# Patient Record
Sex: MALE | Race: WHITE | NOT HISPANIC OR LATINO | Employment: OTHER | URBAN - METROPOLITAN AREA
[De-identification: names, ages, dates, MRNs, and addresses within clinical notes are randomized per-mention and may not be internally consistent; named-entity substitution may affect disease eponyms.]

---

## 2017-10-18 ENCOUNTER — TRANSFERRED RECORDS (OUTPATIENT)
Dept: HEALTH INFORMATION MANAGEMENT | Facility: CLINIC | Age: 57
End: 2017-10-18

## 2017-10-18 LAB
ALT SERPL-CCNC: 29 U/L (ref 0–65)
AST SERPL-CCNC: 17 U/L (ref 5–40)
CHOLEST SERPL-MCNC: 272 MG/DL
CREAT SERPL-MCNC: 0.8 MG/DL (ref 0.5–1.5)
GFR SERPL CREATININE-BSD FRML MDRD: >60 ML/MIN/1.73M2
GLUCOSE SERPL-MCNC: 98 MG/DL (ref 70–100)
HDLC SERPL-MCNC: 39 MG/DL (ref 40–67)
LDLC SERPL CALC-MCNC: 184 MG/DL
POTASSIUM SERPL-SCNC: 4.4 MEQ/L (ref 3.5–5)
TRIGL SERPL-MCNC: 245 MG/DL

## 2017-11-30 ENCOUNTER — OFFICE VISIT (OUTPATIENT)
Dept: ORTHOPEDICS | Facility: CLINIC | Age: 57
End: 2017-11-30
Payer: COMMERCIAL

## 2017-11-30 ENCOUNTER — RADIANT APPOINTMENT (OUTPATIENT)
Dept: GENERAL RADIOLOGY | Facility: CLINIC | Age: 57
End: 2017-11-30
Attending: ORTHOPAEDIC SURGERY
Payer: COMMERCIAL

## 2017-11-30 VITALS — HEIGHT: 68 IN

## 2017-11-30 DIAGNOSIS — M16.0 PRIMARY OSTEOARTHRITIS OF BOTH HIPS: Primary | ICD-10-CM

## 2017-11-30 DIAGNOSIS — M25.552 BILATERAL HIP PAIN: ICD-10-CM

## 2017-11-30 DIAGNOSIS — M25.551 BILATERAL HIP PAIN: ICD-10-CM

## 2017-11-30 PROCEDURE — 99204 OFFICE O/P NEW MOD 45 MIN: CPT | Performed by: ORTHOPAEDIC SURGERY

## 2017-11-30 PROCEDURE — 73523 X-RAY EXAM HIPS BI 5/> VIEWS: CPT | Mod: TC

## 2017-11-30 RX ORDER — CINNAMON BARK
POWDER (GRAM) MISCELLANEOUS
COMMUNITY

## 2017-11-30 RX ORDER — SODIUM PHOSPHATE,MONO-DIBASIC 19G-7G/118
1 ENEMA (ML) RECTAL DAILY
Status: ON HOLD | COMMUNITY
End: 2018-02-14

## 2017-11-30 RX ORDER — ATORVASTATIN CALCIUM 80 MG/1
80 TABLET, FILM COATED ORAL DAILY
COMMUNITY
End: 2019-06-20 | Stop reason: ALTCHOICE

## 2017-11-30 ASSESSMENT — PAIN SCALES - GENERAL: PAINLEVEL: MILD PAIN (2)

## 2017-11-30 NOTE — PATIENT INSTRUCTIONS
Patient advised that there is greater risk with being heavy for longevity of hip replacement and infection risk.     Advised vs 1 meal to have 3 light meals per day with no between meal snacking.  Reduce carbs.

## 2017-11-30 NOTE — PROGRESS NOTES
ORTHOPEDIC CLINIC CONSULT      Markie Rico is a 57 year old male who [facility]  History of Present illness:  Markie presents for evaluation of:   1.) Bilateral hip pain    Onset:  Over a year ago    Symptoms brought on by Pain started in right hip and gradually got worse over time.     Character:  dull ache all the timeand Loss of ROM.    Progression of symptoms:  worse.    Previous similar pain: no .   Pain Level:  2/10 sitting.  10/10 at times  Previous treatments:  rest/inactivity, Ibuprofen, tramadol, Acetaminophen,  and bilateral cortisone injections into hip joint x2.  Currently on Blood thinners? No  Diagnosis of Diabetes? No    Patient reports as above. He states he admits to getting depressed due to lack of mobility. He states he weighed a lot less 1 year ago.  Patient reports he wants started with a cane to help with his right hip 1 year ago and now he is using 2 canes.  Patient also states he sleeps a recliner as he is unable to lay flat in bed. Patient states he's had issues with sciatic pain bilaterally. He has seen chiropractic for that. Patient states also a history of falling off scaffolding and ladders but no known injury to his hips. Patient states he does chew tobacco but he has not done any recent prednisone or does not drink heavily.    Patient's past medical, surgical, social and family histories reviewed.       Past Medical History:   Diagnosis Date     Diverticulitis      Old myocardial infarction     s/p successful emergent revascualrization, PTCR/stent at the [facility] for acute MI     Old myocardial infarction 06/07/11    Non-Q-wave MI-[place]     Other and unspecified hyperlipidemia      Tobacco use disorder     patient states a history of sciatic nerve problems bilaterally   Past Surgical History:   Procedure Laterality Date     C ANESTH,DX ARTHROSCOPIC PROC KNEE JOINT       C COLOSTOMY       C UNLISTED LAPAROSCOPY PROC,INTESTINE  7/25/2003    Exploratory  Lap. Lysis of adhesions. Sigmoid colostomy takedown and stapled coloproctostomy.     HC PTCA SINGLE VESSEL      2 stents       Home Medications:  Prior to Admission medications    Medication Sig Start Date End Date Taking? Authorizing Provider   atorvastatin (LIPITOR) 80 MG tablet Take 80 mg by mouth daily   Yes Reported, Patient   TRAMADOL HCL PO Take 100 mg by mouth 3 times daily as needed for moderate to severe pain   Yes Reported, Patient   Cinnamon Bark POWD    Yes Reported, Patient   Capsicum, Cayenne, (CAYENNE PO)    Yes Reported, Patient   TURMERIC PO    Yes Reported, Patient   glucosamine-chondroitin 500-400 MG CAPS per capsule Take 1 capsule by mouth daily   Yes Reported, Patient   metoprolol (TOPROL XL) 50 MG 24 hr tablet Take 1 tablet by mouth daily.  Patient taking differently: Take 25 mg by mouth 2 times daily  12  Yes Alfred Abernathy MD   lisinopril (PRINIVIL,ZESTRIL) 10 MG tablet Take 1 tablet by mouth daily. Take 10 mg by mouth daily. 12  Yes Alfred Abernathy MD   POTASSIUM CHLORIDE CR PO Take 10 mEq by mouth daily.   Yes Reported, Patient   nitroglycerin (NITROSTAT) 0.4 MG SL tablet Place 1 tablet under the tongue every 5 minutes as needed.  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 He does have 3 people who do help him in his workshop.      Family History   Problem Relation Age of Onset     Lipids Mother      Psychotic Disorder Mother      anxiety     Hypertension Mother      Prostate Cancer Father       at 74     DIABETES Sister   "      REVIEW OF SYSTEMS    General: Negative for fever or fatigue    Psych:  Although not formally diagnosed patient does admit to a depressed mood presently due to immobility    Ophthalmic:  Corrective lenses? Yes    ENT:  Hearing difficulty? No    CV: Past history of myocardial infarction in 2010 and current coronary artery disease    Endocrine: Negative diabetes     Urology:  Negative kidney disease    Resp: Normal respiratory effort     Skin: Negative for cuts/sores or redness    Musculoskeletal: as above    Neurologic: Negative for numbness/tingling    Hematologic: negative for bleeding disorder, does not use of prescription anticoagulants         Physical Exam:    Vitals: Ht 1.715 m (5' 7.5\")  BMI= There is no height or weight on file to calculate BMI. patient's weight per last annual exam 10/17/17 was 297.9 pounds    GENERAL APPEARANCE:   Healthy, alert, no distress    SKIN:  Negative for suspicious lesions or rashes    NEURO: Normal strength and tone, mentation intact and speech normal    PSYCH:   Mentation appears Normal and affect normal/bright    RESPIRATORY: Negative for respiratory distress.    EYES: Negative for Conjunctivitis    Cardiovascular: Patient with minimal dependent edema.      GAIT: Extremely antalgic. Patient utilizing 2 canes and still walking with an antalgic gait    JOINT/EXTREMITIES:  Patient is unable to flex at the hips enough to sit in a regular chair. When asked to lie on the exam table patient requires and requests assistance. He was unable to lift his legs up onto the table.    Radiographs: X-ray images reveal significant bone-on-bone femoral head to acetabulum and erosion and deformation of bilateral hips right greater than left.  Shallow socket also identified.  Independent visualization of the films was made.       Impression:      ICD-10-CM    1. Primary osteoarthritis of both hips M16.0    2. Bilateral hip pain M25.551 XR Pelvis and Hip Bilateral 2 Views    M25.552  "     Patient with significant bone-on-bone and deformed bilateral hip x-rays.  Patient's mobility is significantly impacted. This has contributed in part to patient's rapid weight gain.    Plan:  Patient is advised of the above.  Weight issues are brought up and patient is advised of the significant risk that weight complaining into obtaining total hip arthroplasty. Patient states he would like to change his weight issues but has found that he is becoming more depressed as a result.  Patient requests weight recommendations to be written down so that his wife can help him with his weight loss process.  Patient is advised that typically we request weight loss prior to scheduling surgery however with the condition of his hips, performing the surgery would give him a better opportunity. Patient does state a commitment to losing weight even beyond the surgery. He would like to do right hip first. He would like to wait till after the first of the year as he is visiting his daughter out of state in a month for the holidays.  Patient is advised preoperative labs to obtain.  Patient will be called to schedule surgery    Patient is evaluated with and plan developed in conjunction with Dr. Bedolla    Scribed by  Aide Dias PA-C   2017  3:19 PM        I attest I have seen and evaluated the patient.  I agree with above impression and plan.    Vineet Bedolla MD    Please schedule for surgery, pre op H&P, and post ops.      Patient Name:  Markie Rico ([id number]).  :  1960  Gender:  male  Patient Type:  AM Admit  Surgeon:  Vineet Bedolla MD  Physician requests assist from:  PA    Procedures:    Right total hip arthroplasty   Approach:  Posterior  Diagnosis:     Primary osteoarthritis of both hips    Special instruments/supplies:  [employer]., portable x-ray  Vendor Rep:  [employer].  Anesthesia:  General  Block:  No   Block type: Not applicable  Time needed:  120 minutes    [facility] Discussed:  Not at this time.  Patient will be informed of this at his one week prior to total visit    Post op 1:

## 2017-11-30 NOTE — LETTER
11/30/2017         RE: Markie Rico  [address] [address]        Dear Colleague,    Thank you for referring your patient, Markie Rico, to the [facility]. Please see a copy of my visit note below.    ORTHOPEDIC CLINIC CONSULT      Markie Rico is a 57 year old male who is [facility]  History of Present illness:  Markie presents for evaluation of:   1.) Bilateral hip pain    Onset:  Over a year ago    Symptoms brought on by Pain started in right hip and gradually got worse over time.     Character:  dull ache all the timeand Loss of ROM.    Progression of symptoms:  worse.    Previous similar pain: no .   Pain Level:  2/10 sitting.  10/10 at times  Previous treatments:  rest/inactivity, Ibuprofen, tramadol, Acetaminophen,  and bilateral cortisone injections into hip joint x2.  Currently on Blood thinners? No  Diagnosis of Diabetes? No    Patient reports as above. He states he admits to getting depressed due to lack of mobility. He states he weighed a lot less 1 year ago.  Patient reports he wants started with a cane to help with his right hip 1 year ago and now he is using 2 canes.  Patient also states he sleeps a recliner as he is unable to lay flat in bed. Patient states he's had issues with sciatic pain bilaterally. He has seen chiropractic for that. Patient states also a history of falling off scaffolding and ladders but no known injury to his hips. Patient states he does chew tobacco but he has not done any recent prednisone or does not drink heavily.    Patient's past medical, surgical, social and family histories reviewed.       Past Medical History:   Diagnosis Date     Diverticulitis      Old myocardial infarction     s/p successful emergent revascualrization, PTCR/stent at the [facility] for acute MI     Old myocardial infarction 06/07/11    Non-Q-wave MI-[place]     Other and unspecified hyperlipidemia      Tobacco use disorder     patient  states a history of sciatic nerve problems bilaterally   Past Surgical History:   Procedure Laterality Date     C ANESTH,DX ARTHROSCOPIC PROC KNEE JOINT       C COLOSTOMY       C UNLISTED LAPAROSCOPY PROC,INTESTINE  7/25/2003    Exploratory Lap. Lysis of adhesions. Sigmoid colostomy takedown and stapled coloproctostomy.     HC PTCA SINGLE VESSEL      2 stents       Home Medications:  Prior to Admission medications    Medication Sig Start Date End Date Taking? Authorizing Provider   atorvastatin (LIPITOR) 80 MG tablet Take 80 mg by mouth daily   Yes Reported, Patient   TRAMADOL HCL PO Take 100 mg by mouth 3 times daily as needed for moderate to severe pain   Yes Reported, Patient   Cinnamon Bark POWD    Yes Reported, Patient   Capsicum, Cayenne, (CAYENNE PO)    Yes Reported, Patient   TURMERIC PO    Yes Reported, Patient   glucosamine-chondroitin 500-400 MG CAPS per capsule Take 1 capsule by mouth daily   Yes Reported, Patient   metoprolol (TOPROL XL) 50 MG 24 hr tablet Take 1 tablet by mouth daily.  Patient taking differently: Take 25 mg by mouth 2 times daily  11/23/12  Yes Alfred Abernathy MD   lisinopril (PRINIVIL,ZESTRIL) 10 MG tablet Take 1 tablet by mouth daily. Take 10 mg by mouth daily. 11/23/12  Yes Alfred Abernathy MD   POTASSIUM CHLORIDE CR PO Take 10 mEq by mouth daily.   Yes Reported, Patient   nitroglycerin (NITROSTAT) 0.4 MG SL tablet Place 1 tablet under the tongue every 5 minutes as needed.  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 He does have 3 people  "who do help him in his workshop.      Family History   Problem Relation Age of Onset     Lipids Mother      Psychotic Disorder Mother      anxiety     Hypertension Mother      Prostate Cancer Father       at 74     DIABETES Sister        REVIEW OF SYSTEMS    General: Negative for fever or fatigue    Psych:  Although not formally diagnosed patient does admit to a depressed mood presently due to immobility    Ophthalmic:  Corrective lenses? Yes    ENT:  Hearing difficulty? No    CV: Past history of myocardial infarction in 2010 and current coronary artery disease    Endocrine: Negative diabetes     Urology:  Negative kidney disease    Resp: Normal respiratory effort     Skin: Negative for cuts/sores or redness    Musculoskeletal: as above    Neurologic: Negative for numbness/tingling    Hematologic: negative for bleeding disorder, does not use of prescription anticoagulants         Physical Exam:    Vitals: Ht 1.715 m (5' 7.5\")  BMI= There is no height or weight on file to calculate BMI. patient's weight per last annual exam 10/17/17 was 297.9 pounds    GENERAL APPEARANCE:   Healthy, alert, no distress    SKIN:  Negative for suspicious lesions or rashes    NEURO: Normal strength and tone, mentation intact and speech normal    PSYCH:   Mentation appears Normal and affect normal/bright    RESPIRATORY: Negative for respiratory distress.    EYES: Negative for Conjunctivitis    Cardiovascular: Patient with minimal dependent edema.      GAIT: Extremely antalgic. Patient utilizing 2 canes and still walking with an antalgic gait    JOINT/EXTREMITIES:  Patient is unable to flex at the hips enough to sit in a regular chair. When asked to lie on the exam table patient requires and requests assistance. He was unable to lift his legs up onto the table.    Radiographs: X-ray images reveal significant bone-on-bone femoral head to acetabulum and erosion and deformation of bilateral hips right greater than left.  Shallow socket " also identified.  Independent visualization of the films was made.       Impression:      ICD-10-CM    1. Primary osteoarthritis of both hips M16.0    2. Bilateral hip pain M25.551 XR Pelvis and Hip Bilateral 2 Views    M25.552      Patient with significant bone-on-bone and deformed bilateral hip x-rays.  Patient's mobility is significantly impacted. This has contributed in part to patient's rapid weight gain.    Plan:  Patient is advised of the above.  Weight issues are brought up and patient is advised of the significant risk that weight complaining into obtaining total hip arthroplasty. Patient states he would like to change his weight issues but has found that he is becoming more depressed as a result.  Patient requests weight recommendations to be written down so that his wife can help him with his weight loss process.  Patient is advised that typically we request weight loss prior to scheduling surgery however with the condition of his hips, performing the surgery would give him a better opportunity. Patient does state a commitment to losing weight even beyond the surgery. He would like to do right hip first. He would like to wait till after the first of the year as he is visiting his daughter out of state in a month for the holidays.  Patient is advised preoperative labs to obtain.  Patient will be called to schedule surgery    Patient is evaluated with and plan developed in conjunction with Dr. Bedolla    Scribed by  Aide Dias PA-C   2017  3:19 PM        I attest I have seen and evaluated the patient.  I agree with above impression and plan.    Vineet Bedolla MD    Please schedule for surgery, pre op H&P, and post ops.      Patient Name:  Markie Rico ([id number]).  :  1960  Gender:  male  Patient Type:  AM Admit  Surgeon:  Vineet Bedolla MD  Physician requests assist from:  PA    Procedures:    Right total hip arthroplasty   Approach:  Posterior  Diagnosis:     Primary osteoarthritis of  both hips    Special instruments/supplies:  [employer], portable x-ray  Vendor Rep:  [employer]  Anesthesia:  General  Block:  No   Block type: Not applicable  Time needed:  120 minutes    [facility] Discussed:  Not at this time. Patient will be informed of this at his one week prior to total visit    Post op 1:                  Again, thank you for allowing me to participate in the care of your patient.        Sincerely,        Vineet Bedolla MD

## 2017-11-30 NOTE — MR AVS SNAPSHOT
After Visit Summary   11/30/2017    Markie Rico    MRN: [id number]           Patient Information     Date Of Birth          1960        Visit Information        Provider Department      11/30/2017 2:50 PM Vineet Bedolla MD [facility]        Today's Diagnoses     Primary osteoarthritis of both hips    -  1    Bilateral hip pain          Care Instructions    Patient advised that there is greater risk with being heavy for longevity of hip replacement and infection risk.     Advised vs 1 meal to have 3 light meals per day with no between meal snacking.  Reduce carbs.               Follow-ups after your visit        Who to contact     If you have questions or need follow up information about today's clinic visit or your schedule please contact [facility] directly at [phone].  Normal or non-critical lab and imaging results will be communicated to you by Aloqahart, letter or phone within 4 business days after the clinic has received the results. If you do not hear from us within 7 days, please contact the clinic through Mangat[employer] or phone. If you have a critical or abnormal lab result, we will notify you by phone as soon as possible.  Submit refill requests through [employer] or call your pharmacy and they will forward the refill request to us. Please allow 3 business days for your refill to be completed.          Additional Information About Your Visit        AloqaharBungolow Information     [employer] gives you secure access to your electronic health record. If you see a primary care provider, you can also send messages to your care team and make appointments. If you have questions, please call your primary care clinic.  If you do not have a primary care provider, please call [phone] and they will assist you.        Care EveryWhere ID     This is your Care EveryWhere ID. This could be used by other organizations to access your [place] medical records  [id number]       "  Your Vitals Were     Height                   1.715 m (5' 7.5\")            Blood Pressure from Last 3 Encounters:   05/18/13 116/75   11/23/12 114/72   05/15/12 119/74    Weight from Last 3 Encounters:   11/23/12 133.8 kg (295 lb)   05/15/12 120.2 kg (265 lb)   06/07/11 115.7 kg (255 lb)                 Today's Medication Changes          These changes are accurate as of: 11/30/17  3:16 PM.  If you have any questions, ask your nurse or doctor.               These medicines have changed or have updated prescriptions.        Dose/Directions    metoprolol 50 MG 24 hr tablet   Commonly known as:  TOPROL XL   This may have changed:    - how much to take  - when to take this   Used for:  History of acute myocardial infarction of inferior wall, CAD (coronary artery disease)        Dose:  50 mg   Take 1 tablet by mouth daily.   Quantity:  90 tablet   Refills:  3                Primary Care Provider Office Phone # Fax #    Alfred Seth Abernathy [phone]       [address] [place] [address]        Equal Access to Services     [facility]: Hadii ivy garcia Sochristoph, waaxda luqsoraya, qaybta kaaljefry orr, lela root. So [facility] [phone].    ATENCIÓN: Si habla español, tiene a martin disposición servicios gratuitos de asistencia lingüística. Memo al [phone].    We comply with applicable federal civil rights laws and [place] laws. We do not discriminate on the basis of race, color, national origin, age, disability, sex, sexual orientation, or gender identity.            Thank you!     Thank you for choosing [facility]  for your care. Our goal is always to provide you with excellent care. Hearing back from our patients is one way we can continue to improve our services. Please take a few minutes to complete the written survey that you may receive in the mail after your visit with us. Thank you!             Your Updated Medication List - " Protect others around you: Learn how to safely use, store and throw away your medicines at www.disposemymeds.org.          This list is accurate as of: 11/30/17  3:16 PM.  Always use your most recent med list.                   Brand Name Dispense Instructions for use Diagnosis    atorvastatin 80 MG tablet    LIPITOR     Take 80 mg by mouth daily        CAYENNE PO           Cinnamon Bark Powd           glucosamine-chondroitin 500-400 MG Caps per capsule      Take 1 capsule by mouth daily        lisinopril 10 MG tablet    PRINIVIL/ZESTRIL    90 tablet    Take 1 tablet by mouth daily. Take 10 mg by mouth daily.    History of acute myocardial infarction of inferior wall, CAD (coronary artery disease)       metoprolol 50 MG 24 hr tablet    TOPROL XL    90 tablet    Take 1 tablet by mouth daily.    History of acute myocardial infarction of inferior wall, CAD (coronary artery disease)       nitroGLYcerin 0.4 MG sublingual tablet    NITROSTAT    15 tablet    Place 1 tablet under the tongue every 5 minutes as needed.    History of acute myocardial infarction of inferior wall, CAD (coronary artery disease)       POTASSIUM CHLORIDE CR PO      Take 10 mEq by mouth daily.        TRAMADOL HCL PO      Take 100 mg by mouth 3 times daily as needed for moderate to severe pain        TURMERIC PO

## 2017-11-30 NOTE — NURSING NOTE
"Chief Complaint   Patient presents with     Consult       Initial Ht 1.715 m (5' 7.5\") Estimated body mass index is 45.19 kg/(m^2) as calculated from the following:    Height as of 11/23/12: 1.721 m (5' 7.75\").    Weight as of 11/23/12: 133.8 kg (295 lb).  Medication Reconciliation: complete   YULIYA Hutchins  "

## 2017-12-07 ENCOUNTER — TELEPHONE (OUTPATIENT)
Dept: ORTHOPEDICS | Facility: CLINIC | Age: 57
End: 2017-12-07

## 2017-12-07 NOTE — TELEPHONE ENCOUNTER
Surgery Scheduled    Date of Surgery 02/12/18 Time of Surgery   Procedure: Right total hip arthroplasty  Hospital/Surgical Facility: [facility]  Surgeon: Dr. Bedolla  Type of Anesthesia : General  [facility] or if hew can have it here.   Post op:02/26/18 with Dr. Bedolla        Surgery packet mailed to patient's home address. Patients instructed to arrive 1 hours prior to surgery. Patient understood and agrees to plan.    Komal Fisher  Surgery Scheduler

## 2017-12-19 DIAGNOSIS — Z01.818 PRE-OP EXAM: Primary | ICD-10-CM

## 2018-01-08 NOTE — TELEPHONE ENCOUNTER
Please see message below -      Patient called, states the surgery date needs to be faxed [facility] for the [facility] to [facility]. Please fax to  Dr. García, chanda Gross - [phone] - Ginny's phone number is [phone].    Thank you, Rhina.

## 2018-01-26 ENCOUNTER — TRANSFERRED RECORDS (OUTPATIENT)
Dept: HEALTH INFORMATION MANAGEMENT | Facility: CLINIC | Age: 58
End: 2018-01-26

## 2018-01-26 LAB
ALT SERPL-CCNC: 34 U/L (ref 0–65)
AST SERPL-CCNC: 23 U/L (ref 5–40)
CHOLEST SERPL-MCNC: 321 MG/DL
CREAT SERPL-MCNC: 0.8 MG/DL (ref 0.5–1.5)
GFR SERPL CREATININE-BSD FRML MDRD: >60 ML/MIN/1.73M2
GLUCOSE SERPL-MCNC: 116 MG/DL (ref 70–100)
HDLC SERPL-MCNC: 39 MG/DL (ref 40–67)
LDLC SERPL CALC-MCNC: 240 MG/DL
POTASSIUM SERPL-SCNC: 4.8 MEQ/L (ref 3.5–5)
TRIGL SERPL-MCNC: 209 MG/DL

## 2018-02-05 ENCOUNTER — OFFICE VISIT (OUTPATIENT)
Dept: ORTHOPEDICS | Facility: CLINIC | Age: 58
End: 2018-02-05

## 2018-02-05 VITALS — WEIGHT: 286 LBS | HEIGHT: 68 IN | BODY MASS INDEX: 43.35 KG/M2 | TEMPERATURE: 97.1 F

## 2018-02-05 DIAGNOSIS — Z01.818 PRE-OP EXAM: Primary | ICD-10-CM

## 2018-02-05 DIAGNOSIS — M16.0 PRIMARY OSTEOARTHRITIS OF BOTH HIPS: ICD-10-CM

## 2018-02-05 PROCEDURE — 99207 ZZC PREOP VISIT IN GLOBAL PKG: CPT | Performed by: ORTHOPAEDIC SURGERY

## 2018-02-05 RX ORDER — GABAPENTIN 300 MG/1
300 CAPSULE ORAL DAILY
Qty: 30 CAPSULE | Refills: 1 | Status: SHIPPED | OUTPATIENT
Start: 2018-02-05 | End: 2018-03-20

## 2018-02-05 RX ORDER — MELOXICAM 7.5 MG/1
7.5 TABLET ORAL DAILY
Qty: 30 TABLET | Refills: 1 | Status: SHIPPED | OUTPATIENT
Start: 2018-02-05 | End: 2018-03-20

## 2018-02-05 ASSESSMENT — PAIN SCALES - GENERAL: PAINLEVEL: MODERATE PAIN (4)

## 2018-02-05 NOTE — LETTER
2/5/2018         RE: Markie Rico  [address] [address]        Dear Colleague,    Thank you for referring your patient, Markie Rico, to the [facility]. Please see a copy of my visit note below.    Patient is seen for one week visit prior to right total hip arthroplasty.  [facility]; [facility] were done there as well.  Plan to discharge to home with his wife after surgery.    He did not do the joint class, so he was provided with information today.  He was provided with Mobic and Neurontin medications today.  Plan to use Aspirin for DVT prophylaxis.  No open skin areas on examination of his lower extremities today.    Again, thank you for allowing me to participate in the care of your patient.        Sincerely,        Vineet Bedolla MD

## 2018-02-05 NOTE — MR AVS SNAPSHOT
After Visit Summary   2/5/2018    Markie Rico    MRN: [id number]           Patient Information     Date Of Birth          1960        Visit Information        Provider Department      2/5/2018 2:10 PM Vineet Bedolla MD [facility]        Today's Diagnoses     Pre-op exam    -  1    Primary osteoarthritis of both hips           Follow-ups after your visit        Your next 10 appointments already scheduled     Feb 12, 2018   Procedure with Vineet Bedolla MD   [facility] ([facility])    [address] [place] [address] [id number] [phone]           From [address]: Exit at [employer] on south side of [place]. Turn right on [email or web address]. Turn left at stoplight on [address]. [facility] will be in view two blocks ahead            Feb 26, 2018  1:10 PM CST   Return Visit with Vineet Bedolla MD   [facility]    [address] [id number] [phone]              Who to contact     If you have questions or need follow up information about today's clinic visit or your schedule please contact [facility] directly at [phone].  Normal or non-critical lab and imaging results will be communicated to you by MyChart, letter or phone within 4 business days after the clinic has received the results. If you do not hear from us within 7 days, please contact the clinic through Temptsterhart or phone. If you have a critical or abnormal lab result, we will notify you by phone as soon as possible.  Submit refill requests through [employer]ldfire or call your pharmacy and they will forward the refill request to us. Please allow 3 business days for your refill to be completed.          Additional Information About Your Visit        Temptsterhart Information     Wyldfire gives you secure access to your electronic health record. If you see a primary care provider,  "you can also send messages to your care team and make appointments. If you have questions, please call your primary care clinic.  If you do not have a primary care provider, please call [phone] and they will assist you.        Care EveryWhere ID     This is your Care EveryWhere ID. This could be used by other organizations to access your [place] medical records  [id number]        Your Vitals Were     Temperature Height BMI (Body Mass Index)             97.1  F (36.2  C) (Temporal) 1.715 m (5' 7.5\") 44.13 kg/m2          Blood Pressure from Last 3 Encounters:   05/18/13 116/75   11/23/12 114/72   05/15/12 119/74    Weight from Last 3 Encounters:   02/05/18 129.7 kg (286 lb)   11/23/12 133.8 kg (295 lb)   05/15/12 120.2 kg (265 lb)              Today, you had the following     No orders found for display         Today's Medication Changes          These changes are accurate as of 2/5/18  5:01 PM.  If you have any questions, ask your nurse or doctor.               Start taking these medicines.        Dose/Directions    gabapentin 300 MG capsule   Commonly known as:  NEURONTIN   Used for:  Pre-op exam   Started by:  Vineet Bedolla MD        Dose:  300 mg   Take 1 capsule (300 mg) by mouth daily   Quantity:  30 capsule   Refills:  1       meloxicam 7.5 MG tablet   Commonly known as:  MOBIC   Used for:  Pre-op exam   Started by:  Vineet Bedolla MD        Dose:  7.5 mg   Take 1 tablet (7.5 mg) by mouth daily   Quantity:  30 tablet   Refills:  1         These medicines have changed or have updated prescriptions.        Dose/Directions    metoprolol succinate 50 MG 24 hr tablet   Commonly known as:  TOPROL XL   This may have changed:    - how much to take  - when to take this   Used for:  History of acute myocardial infarction of inferior wall, CAD (coronary artery disease)        Dose:  50 mg   Take 1 tablet by mouth daily.   Quantity:  90 tablet   Refills:  3            Where to get your medicines    "   Some of these will need a paper prescription and others can be bought over the counter.  Ask your nurse if you have questions.     Bring a paper prescription for each of these medications     gabapentin 300 MG capsule    meloxicam 7.5 MG tablet                Primary Care Provider Office Phone # Fax #    Alfred Seth Abernathy [phone] [address] [place] [address]        Equal Access to Services     [facility] : Hadii aad ku hadasho Soomaali, waaxda luqadaha, qaybta kaalmada adeegyada, waxay idiin hayaan adeeg kharash la'aan ah. So [facility] [phone].    ATENCIÓN: Si habla español, tiene a martin disposición servicios gratuitos de asistencia lingüística. Llame al [phone].    We comply with applicable federal civil rights laws and [place] laws. We do not discriminate on the basis of race, color, national origin, age, disability, sex, sexual orientation, or gender identity.            Thank you!     Thank you for choosing [facility]  for your care. Our goal is always to provide you with excellent care. Hearing back from our patients is one way we can continue to improve our services. Please take a few minutes to complete the written survey that you may receive in the mail after your visit with us. Thank you!             Your Updated Medication List - Protect others around you: Learn how to safely use, store and throw away your medicines at www.disposemymeds.org.          This list is accurate as of 2/5/18  5:01 PM.  Always use your most recent med list.                   Brand Name Dispense Instructions for use Diagnosis    ALEVE PO      Take 220 mg by mouth 3 times daily (with meals)        atorvastatin 80 MG tablet    LIPITOR     Take 80 mg by mouth daily        CAYENNE PO           Cinnamon Bark Powd           gabapentin 300 MG capsule    NEURONTIN    30 capsule    Take 1 capsule (300 mg) by mouth daily    Pre-op exam       glucosamine-chondroitin 500-400 MG  Caps per capsule      Take 1 capsule by mouth daily        lisinopril 10 MG tablet    PRINIVIL/ZESTRIL    90 tablet    Take 1 tablet by mouth daily. Take 10 mg by mouth daily.    History of acute myocardial infarction of inferior wall, CAD (coronary artery disease)       MAGNESIUM OXIDE PO      Take 400 mg by mouth        meloxicam 7.5 MG tablet    MOBIC    30 tablet    Take 1 tablet (7.5 mg) by mouth daily    Pre-op exam       metoprolol succinate 50 MG 24 hr tablet    TOPROL XL    90 tablet    Take 1 tablet by mouth daily.    History of acute myocardial infarction of inferior wall, CAD (coronary artery disease)       NIACIN (ANTIHYPERLIPIDEMIC) PO      Take 500 mg by mouth        nitroGLYcerin 0.4 MG sublingual tablet    NITROSTAT    15 tablet    Place 1 tablet under the tongue every 5 minutes as needed.    History of acute myocardial infarction of inferior wall, CAD (coronary artery disease)       POTASSIUM CHLORIDE CR PO      Take 10 mEq by mouth daily.        TRAMADOL HCL PO      Take 100 mg by mouth 3 times daily as needed for moderate to severe pain        TURMERIC PO

## 2018-02-05 NOTE — PROGRESS NOTES
Patient is seen for one week visit prior to right total hip arthroplasty.  [facility].  Plan to discharge to home with his wife after surgery.    He did not do the joint class, so he was provided with information today.  He was provided with Mobic and Neurontin medications today.  Plan to use Aspirin for DVT prophylaxis.  No open skin areas on examination of his lower extremities today.

## 2018-02-06 ENCOUNTER — TELEPHONE (OUTPATIENT)
Dept: ORTHOPEDICS | Facility: CLINIC | Age: 58
End: 2018-02-06

## 2018-02-06 NOTE — TELEPHONE ENCOUNTER
Reason for Call:  Other prescription    Detailed comments: Terrie from the [facility] called and had a couple questions about the Gabapentin and Meloxicam. She was wondering if these were for prior to surgery or for after surgery. She needs this so she can get the medication approved.     Phone Number Patient can be reached at: Other phone number:  [phone]    Best Time: Any    Can we leave a detailed message on this number? YES    Call taken on 2/6/2018 at 9:44 AM by Cherry Mustafa

## 2018-02-06 NOTE — TELEPHONE ENCOUNTER
[facility]. The  did not know who Terrie was. She transferred me to a number and I left a message for them to call me.  Pt is to take both Gabapentin and Meloxicam to start 3 days before  His total joint replacement.  JOHN Jimenez

## 2018-02-07 NOTE — TELEPHONE ENCOUNTER
[facility] again this am. I spoke to a nurse and she will do some work on this and try to find out who Terrie is. JOHN Jimenez

## 2018-02-12 ENCOUNTER — HOSPITAL ENCOUNTER (INPATIENT)
Facility: CLINIC | Age: 58
LOS: 3 days | Discharge: HOME HEALTH | DRG: 470 | End: 2018-02-15
Attending: ORTHOPAEDIC SURGERY | Admitting: ORTHOPAEDIC SURGERY
Payer: COMMERCIAL

## 2018-02-12 ENCOUNTER — ANESTHESIA (OUTPATIENT)
Dept: SURGERY | Facility: CLINIC | Age: 58
DRG: 470 | End: 2018-02-12
Payer: COMMERCIAL

## 2018-02-12 ENCOUNTER — APPOINTMENT (OUTPATIENT)
Dept: GENERAL RADIOLOGY | Facility: CLINIC | Age: 58
DRG: 470 | End: 2018-02-12
Attending: ORTHOPAEDIC SURGERY
Payer: COMMERCIAL

## 2018-02-12 ENCOUNTER — ANESTHESIA EVENT (OUTPATIENT)
Dept: SURGERY | Facility: CLINIC | Age: 58
DRG: 470 | End: 2018-02-12
Payer: COMMERCIAL

## 2018-02-12 ENCOUNTER — APPOINTMENT (OUTPATIENT)
Dept: PHYSICAL THERAPY | Facility: CLINIC | Age: 58
DRG: 470 | End: 2018-02-12
Attending: ORTHOPAEDIC SURGERY
Payer: COMMERCIAL

## 2018-02-12 ENCOUNTER — HOSPITAL ENCOUNTER (OUTPATIENT)
Dept: GENERAL RADIOLOGY | Facility: CLINIC | Age: 58
DRG: 470 | End: 2018-02-12
Attending: ORTHOPAEDIC SURGERY | Admitting: ORTHOPAEDIC SURGERY
Payer: COMMERCIAL

## 2018-02-12 DIAGNOSIS — M19.90 OSTEOARTHRITIS: ICD-10-CM

## 2018-02-12 DIAGNOSIS — Z96.649 STATUS POST TOTAL REPLACEMENT OF HIP, UNSPECIFIED LATERALITY: Primary | ICD-10-CM

## 2018-02-12 DIAGNOSIS — Z96.641 STATUS POST TOTAL REPLACEMENT OF RIGHT HIP: ICD-10-CM

## 2018-02-12 DIAGNOSIS — Z78.9 DEEP VEIN THROMBOSIS (DVT) PROPHYLAXIS PRESCRIBED AT DISCHARGE: ICD-10-CM

## 2018-02-12 PROBLEM — I10 BENIGN ESSENTIAL HYPERTENSION: Status: ACTIVE | Noted: 2017-11-30

## 2018-02-12 PROCEDURE — 40000986 XR PELVIS AD HIP PORTABLE RIGHT 1 VIEW

## 2018-02-12 PROCEDURE — 97530 THERAPEUTIC ACTIVITIES: CPT | Mod: GP

## 2018-02-12 PROCEDURE — 36000063 ZZH SURGERY LEVEL 4 EA 15 ADDTL MIN: Performed by: ORTHOPAEDIC SURGERY

## 2018-02-12 PROCEDURE — 25000132 ZZH RX MED GY IP 250 OP 250 PS 637: Performed by: PHYSICIAN ASSISTANT

## 2018-02-12 PROCEDURE — C1776 JOINT DEVICE (IMPLANTABLE): HCPCS | Performed by: ORTHOPAEDIC SURGERY

## 2018-02-12 PROCEDURE — 0SR902A REPLACEMENT OF RIGHT HIP JOINT WITH METAL ON POLYETHYLENE SYNTHETIC SUBSTITUTE, UNCEMENTED, OPEN APPROACH: ICD-10-PCS | Performed by: ORTHOPAEDIC SURGERY

## 2018-02-12 PROCEDURE — 27130 TOTAL HIP ARTHROPLASTY: CPT | Mod: RT | Performed by: ORTHOPAEDIC SURGERY

## 2018-02-12 PROCEDURE — 40000193 ZZH STATISTIC PT WARD VISIT

## 2018-02-12 PROCEDURE — 40000306 ZZH STATISTIC PRE PROC ASSESS II: Performed by: ORTHOPAEDIC SURGERY

## 2018-02-12 PROCEDURE — 40000985 XR PELVIS PORT 1/2 VW

## 2018-02-12 PROCEDURE — 71000014 ZZH RECOVERY PHASE 1 LEVEL 2 FIRST HR: Performed by: ORTHOPAEDIC SURGERY

## 2018-02-12 PROCEDURE — 25000125 ZZHC RX 250: Performed by: NURSE ANESTHETIST, CERTIFIED REGISTERED

## 2018-02-12 PROCEDURE — 25000128 H RX IP 250 OP 636: Performed by: PHYSICIAN ASSISTANT

## 2018-02-12 PROCEDURE — 37000008 ZZH ANESTHESIA TECHNICAL FEE, 1ST 30 MIN: Performed by: ORTHOPAEDIC SURGERY

## 2018-02-12 PROCEDURE — 25000564 ZZH DESFLURANE, EA 15 MIN: Performed by: ORTHOPAEDIC SURGERY

## 2018-02-12 PROCEDURE — 71000015 ZZH RECOVERY PHASE 1 LEVEL 2 EA ADDTL HR: Performed by: ORTHOPAEDIC SURGERY

## 2018-02-12 PROCEDURE — 99232 SBSQ HOSP IP/OBS MODERATE 35: CPT | Performed by: FAMILY MEDICINE

## 2018-02-12 PROCEDURE — 36000093 ZZH SURGERY LEVEL 4 1ST 30 MIN: Performed by: ORTHOPAEDIC SURGERY

## 2018-02-12 PROCEDURE — 27210995 ZZH RX 272: Performed by: ORTHOPAEDIC SURGERY

## 2018-02-12 PROCEDURE — 97162 PT EVAL MOD COMPLEX 30 MIN: CPT | Mod: GP

## 2018-02-12 PROCEDURE — 12000000 ZZH R&B MED SURG/OB

## 2018-02-12 PROCEDURE — C9399 UNCLASSIFIED DRUGS OR BIOLOG: HCPCS | Performed by: NURSE ANESTHETIST, CERTIFIED REGISTERED

## 2018-02-12 PROCEDURE — 25000128 H RX IP 250 OP 636: Performed by: NURSE ANESTHETIST, CERTIFIED REGISTERED

## 2018-02-12 PROCEDURE — 25000128 H RX IP 250 OP 636: Performed by: ORTHOPAEDIC SURGERY

## 2018-02-12 PROCEDURE — 12000007 ZZH R&B INTERMEDIATE

## 2018-02-12 PROCEDURE — 27110028 ZZH OR GENERAL SUPPLY NON-STERILE: Performed by: ORTHOPAEDIC SURGERY

## 2018-02-12 PROCEDURE — 27210794 ZZH OR GENERAL SUPPLY STERILE: Performed by: ORTHOPAEDIC SURGERY

## 2018-02-12 PROCEDURE — 37000009 ZZH ANESTHESIA TECHNICAL FEE, EACH ADDTL 15 MIN: Performed by: ORTHOPAEDIC SURGERY

## 2018-02-12 PROCEDURE — 25000132 ZZH RX MED GY IP 250 OP 250 PS 637: Performed by: ORTHOPAEDIC SURGERY

## 2018-02-12 DEVICE — IMP LINER STRK TRIDENT X3 POLY 36MM 0DEG SZ F 623-00-36F: Type: IMPLANTABLE DEVICE | Site: HIP | Status: FUNCTIONAL

## 2018-02-12 DEVICE — IMP SCR BONE STRK TORX 6.5X30MM CAN 2030-6530-1: Type: IMPLANTABLE DEVICE | Site: HIP | Status: FUNCTIONAL

## 2018-02-12 DEVICE — IMP HEAD FEMORAL STRK BIOLOX DELTA CERAMIC 36MM +0MM: Type: IMPLANTABLE DEVICE | Site: HIP | Status: FUNCTIONAL

## 2018-02-12 RX ORDER — ONDANSETRON 2 MG/ML
4 INJECTION INTRAMUSCULAR; INTRAVENOUS EVERY 6 HOURS PRN
Status: DISCONTINUED | OUTPATIENT
Start: 2018-02-12 | End: 2018-02-15 | Stop reason: HOSPADM

## 2018-02-12 RX ORDER — NALOXONE HYDROCHLORIDE 0.4 MG/ML
.1-.4 INJECTION, SOLUTION INTRAMUSCULAR; INTRAVENOUS; SUBCUTANEOUS
Status: DISCONTINUED | OUTPATIENT
Start: 2018-02-12 | End: 2018-02-12

## 2018-02-12 RX ORDER — SODIUM CHLORIDE, SODIUM LACTATE, POTASSIUM CHLORIDE, CALCIUM CHLORIDE 600; 310; 30; 20 MG/100ML; MG/100ML; MG/100ML; MG/100ML
INJECTION, SOLUTION INTRAVENOUS CONTINUOUS
Status: DISCONTINUED | OUTPATIENT
Start: 2018-02-12 | End: 2018-02-12 | Stop reason: HOSPADM

## 2018-02-12 RX ORDER — HYDROMORPHONE HYDROCHLORIDE 2 MG/1
2-4 TABLET ORAL
Status: DISCONTINUED | OUTPATIENT
Start: 2018-02-12 | End: 2018-02-15 | Stop reason: HOSPADM

## 2018-02-12 RX ORDER — ASPIRIN 325 MG/1
325 TABLET, FILM COATED ORAL
Status: DISCONTINUED | OUTPATIENT
Start: 2018-02-12 | End: 2018-02-15 | Stop reason: HOSPADM

## 2018-02-12 RX ORDER — ACETAMINOPHEN 325 MG/1
975 TABLET ORAL EVERY 8 HOURS
Status: COMPLETED | OUTPATIENT
Start: 2018-02-12 | End: 2018-02-15

## 2018-02-12 RX ORDER — FENTANYL CITRATE 50 UG/ML
25-50 INJECTION, SOLUTION INTRAMUSCULAR; INTRAVENOUS
Status: DISCONTINUED | OUTPATIENT
Start: 2018-02-12 | End: 2018-02-12 | Stop reason: HOSPADM

## 2018-02-12 RX ORDER — METOPROLOL SUCCINATE 25 MG/1
25 TABLET, EXTENDED RELEASE ORAL 2 TIMES DAILY
Status: DISCONTINUED | OUTPATIENT
Start: 2018-02-12 | End: 2018-02-15 | Stop reason: HOSPADM

## 2018-02-12 RX ORDER — FENTANYL CITRATE 50 UG/ML
INJECTION, SOLUTION INTRAMUSCULAR; INTRAVENOUS PRN
Status: DISCONTINUED | OUTPATIENT
Start: 2018-02-12 | End: 2018-02-12

## 2018-02-12 RX ORDER — ONDANSETRON 2 MG/ML
INJECTION INTRAMUSCULAR; INTRAVENOUS PRN
Status: DISCONTINUED | OUTPATIENT
Start: 2018-02-12 | End: 2018-02-12

## 2018-02-12 RX ORDER — MAGNESIUM OXIDE 400 MG/1
400 TABLET ORAL DAILY
Status: DISCONTINUED | OUTPATIENT
Start: 2018-02-12 | End: 2018-02-15 | Stop reason: HOSPADM

## 2018-02-12 RX ORDER — LIDOCAINE 40 MG/G
CREAM TOPICAL
Status: DISCONTINUED | OUTPATIENT
Start: 2018-02-12 | End: 2018-02-12 | Stop reason: HOSPADM

## 2018-02-12 RX ORDER — MEPERIDINE HYDROCHLORIDE 25 MG/ML
12.5 INJECTION INTRAMUSCULAR; INTRAVENOUS; SUBCUTANEOUS EVERY 5 MIN PRN
Status: DISCONTINUED | OUTPATIENT
Start: 2018-02-12 | End: 2018-02-12 | Stop reason: HOSPADM

## 2018-02-12 RX ORDER — NITROGLYCERIN 0.4 MG/1
0.4 TABLET SUBLINGUAL EVERY 5 MIN PRN
Status: DISCONTINUED | OUTPATIENT
Start: 2018-02-12 | End: 2018-02-15 | Stop reason: HOSPADM

## 2018-02-12 RX ORDER — MELOXICAM 7.5 MG/1
7.5 TABLET ORAL DAILY
Status: DISCONTINUED | OUTPATIENT
Start: 2018-02-13 | End: 2018-02-15 | Stop reason: HOSPADM

## 2018-02-12 RX ORDER — ACETAMINOPHEN 500 MG
1000 TABLET ORAL ONCE
Status: COMPLETED | OUTPATIENT
Start: 2018-02-12 | End: 2018-02-12

## 2018-02-12 RX ORDER — HYDROXYZINE HYDROCHLORIDE 25 MG/1
25 TABLET, FILM COATED ORAL EVERY 6 HOURS PRN
Status: DISCONTINUED | OUTPATIENT
Start: 2018-02-12 | End: 2018-02-15 | Stop reason: HOSPADM

## 2018-02-12 RX ORDER — SODIUM CHLORIDE 9 MG/ML
INJECTION, SOLUTION INTRAVENOUS CONTINUOUS
Status: DISCONTINUED | OUTPATIENT
Start: 2018-02-12 | End: 2018-02-15 | Stop reason: HOSPADM

## 2018-02-12 RX ORDER — CEFAZOLIN SODIUM 1 G/50ML
3 SOLUTION INTRAVENOUS
Status: COMPLETED | OUTPATIENT
Start: 2018-02-12 | End: 2018-02-12

## 2018-02-12 RX ORDER — METOPROLOL TARTRATE 1 MG/ML
1-2 INJECTION, SOLUTION INTRAVENOUS EVERY 5 MIN PRN
Status: DISCONTINUED | OUTPATIENT
Start: 2018-02-12 | End: 2018-02-12 | Stop reason: HOSPADM

## 2018-02-12 RX ORDER — ACETAMINOPHEN 325 MG/1
650 TABLET ORAL EVERY 4 HOURS PRN
Status: DISCONTINUED | OUTPATIENT
Start: 2018-02-15 | End: 2018-02-15 | Stop reason: HOSPADM

## 2018-02-12 RX ORDER — HYDROMORPHONE HYDROCHLORIDE 1 MG/ML
.3-.5 INJECTION, SOLUTION INTRAMUSCULAR; INTRAVENOUS; SUBCUTANEOUS
Status: DISCONTINUED | OUTPATIENT
Start: 2018-02-12 | End: 2018-02-15 | Stop reason: HOSPADM

## 2018-02-12 RX ORDER — ERGOCALCIFEROL (VITAMIN D2) 10 MCG
1 TABLET ORAL DAILY
COMMUNITY
End: 2018-04-14

## 2018-02-12 RX ORDER — PROPOFOL 10 MG/ML
INJECTION, EMULSION INTRAVENOUS PRN
Status: DISCONTINUED | OUTPATIENT
Start: 2018-02-12 | End: 2018-02-12

## 2018-02-12 RX ORDER — GABAPENTIN 300 MG/1
300 CAPSULE ORAL DAILY
Status: DISCONTINUED | OUTPATIENT
Start: 2018-02-13 | End: 2018-02-15 | Stop reason: HOSPADM

## 2018-02-12 RX ORDER — CELECOXIB 200 MG/1
400 CAPSULE ORAL
Status: COMPLETED | OUTPATIENT
Start: 2018-02-12 | End: 2018-02-12

## 2018-02-12 RX ORDER — CEFAZOLIN SODIUM 2 G/100ML
2 INJECTION, SOLUTION INTRAVENOUS EVERY 8 HOURS
Status: COMPLETED | OUTPATIENT
Start: 2018-02-12 | End: 2018-02-13

## 2018-02-12 RX ORDER — LIDOCAINE 40 MG/G
CREAM TOPICAL
Status: DISCONTINUED | OUTPATIENT
Start: 2018-02-12 | End: 2018-02-15 | Stop reason: HOSPADM

## 2018-02-12 RX ORDER — CYCLOBENZAPRINE HCL 10 MG
10 TABLET ORAL 3 TIMES DAILY PRN
Status: DISCONTINUED | OUTPATIENT
Start: 2018-02-12 | End: 2018-02-15 | Stop reason: HOSPADM

## 2018-02-12 RX ORDER — ATORVASTATIN CALCIUM 40 MG/1
80 TABLET, FILM COATED ORAL DAILY
Status: DISCONTINUED | OUTPATIENT
Start: 2018-02-12 | End: 2018-02-12

## 2018-02-12 RX ORDER — ALBUTEROL SULFATE 0.83 MG/ML
2.5 SOLUTION RESPIRATORY (INHALATION) EVERY 4 HOURS PRN
Status: DISCONTINUED | OUTPATIENT
Start: 2018-02-12 | End: 2018-02-12 | Stop reason: HOSPADM

## 2018-02-12 RX ORDER — HYDROMORPHONE HYDROCHLORIDE 1 MG/ML
.3-.5 INJECTION, SOLUTION INTRAMUSCULAR; INTRAVENOUS; SUBCUTANEOUS EVERY 5 MIN PRN
Status: DISCONTINUED | OUTPATIENT
Start: 2018-02-12 | End: 2018-02-12 | Stop reason: HOSPADM

## 2018-02-12 RX ORDER — CEFAZOLIN SODIUM 1 G/3ML
1 INJECTION, POWDER, FOR SOLUTION INTRAMUSCULAR; INTRAVENOUS SEE ADMIN INSTRUCTIONS
Status: DISCONTINUED | OUTPATIENT
Start: 2018-02-12 | End: 2018-02-12 | Stop reason: HOSPADM

## 2018-02-12 RX ORDER — NALOXONE HYDROCHLORIDE 0.4 MG/ML
.1-.4 INJECTION, SOLUTION INTRAMUSCULAR; INTRAVENOUS; SUBCUTANEOUS
Status: ACTIVE | OUTPATIENT
Start: 2018-02-12 | End: 2018-02-13

## 2018-02-12 RX ORDER — ONDANSETRON 2 MG/ML
4 INJECTION INTRAMUSCULAR; INTRAVENOUS EVERY 30 MIN PRN
Status: DISCONTINUED | OUTPATIENT
Start: 2018-02-12 | End: 2018-02-12 | Stop reason: HOSPADM

## 2018-02-12 RX ORDER — LISINOPRIL 10 MG/1
10 TABLET ORAL DAILY
Status: DISCONTINUED | OUTPATIENT
Start: 2018-02-12 | End: 2018-02-15 | Stop reason: HOSPADM

## 2018-02-12 RX ORDER — HYDRALAZINE HYDROCHLORIDE 20 MG/ML
2.5-5 INJECTION INTRAMUSCULAR; INTRAVENOUS EVERY 10 MIN PRN
Status: DISCONTINUED | OUTPATIENT
Start: 2018-02-12 | End: 2018-02-12 | Stop reason: HOSPADM

## 2018-02-12 RX ORDER — AMOXICILLIN 250 MG
1 CAPSULE ORAL 2 TIMES DAILY PRN
Status: DISCONTINUED | OUTPATIENT
Start: 2018-02-12 | End: 2018-02-15 | Stop reason: HOSPADM

## 2018-02-12 RX ORDER — LIDOCAINE HYDROCHLORIDE 20 MG/ML
INJECTION, SOLUTION INFILTRATION; PERINEURAL PRN
Status: DISCONTINUED | OUTPATIENT
Start: 2018-02-12 | End: 2018-02-12

## 2018-02-12 RX ORDER — DIMENHYDRINATE 50 MG/ML
25 INJECTION, SOLUTION INTRAMUSCULAR; INTRAVENOUS
Status: DISCONTINUED | OUTPATIENT
Start: 2018-02-12 | End: 2018-02-12 | Stop reason: HOSPADM

## 2018-02-12 RX ORDER — GABAPENTIN 300 MG/1
300 CAPSULE ORAL
Status: COMPLETED | OUTPATIENT
Start: 2018-02-12 | End: 2018-02-12

## 2018-02-12 RX ORDER — ONDANSETRON 4 MG/1
4 TABLET, ORALLY DISINTEGRATING ORAL EVERY 6 HOURS PRN
Status: DISCONTINUED | OUTPATIENT
Start: 2018-02-12 | End: 2018-02-15 | Stop reason: HOSPADM

## 2018-02-12 RX ORDER — ONDANSETRON 4 MG/1
4 TABLET, ORALLY DISINTEGRATING ORAL EVERY 30 MIN PRN
Status: DISCONTINUED | OUTPATIENT
Start: 2018-02-12 | End: 2018-02-12 | Stop reason: HOSPADM

## 2018-02-12 RX ORDER — AMOXICILLIN 250 MG
2 CAPSULE ORAL 2 TIMES DAILY PRN
Status: DISCONTINUED | OUTPATIENT
Start: 2018-02-12 | End: 2018-02-15 | Stop reason: HOSPADM

## 2018-02-12 RX ADMIN — MAGNESIUM OXIDE TAB 400 MG (241.3 MG ELEMENTAL MG) 400 MG: 400 (241.3 MG) TAB at 21:22

## 2018-02-12 RX ADMIN — HYDROMORPHONE HYDROCHLORIDE 2 MG: 2 TABLET ORAL at 16:04

## 2018-02-12 RX ADMIN — ASPIRIN 325 MG: 325 TABLET, FILM COATED ORAL at 21:22

## 2018-02-12 RX ADMIN — HYDROMORPHONE HYDROCHLORIDE 2 MG: 2 TABLET ORAL at 22:45

## 2018-02-12 RX ADMIN — CEFAZOLIN SODIUM 3 G: 1 INJECTION, POWDER, FOR SOLUTION INTRAMUSCULAR; INTRAVENOUS at 11:03

## 2018-02-12 RX ADMIN — HYDROMORPHONE HYDROCHLORIDE 0.5 MG: 1 INJECTION, SOLUTION INTRAMUSCULAR; INTRAVENOUS; SUBCUTANEOUS at 12:37

## 2018-02-12 RX ADMIN — PHENYLEPHRINE HYDROCHLORIDE 100 MCG: 10 INJECTION, SOLUTION INTRAMUSCULAR; INTRAVENOUS; SUBCUTANEOUS at 12:25

## 2018-02-12 RX ADMIN — FENTANYL CITRATE 50 MCG: 50 INJECTION, SOLUTION INTRAMUSCULAR; INTRAVENOUS at 14:09

## 2018-02-12 RX ADMIN — HYDROMORPHONE HYDROCHLORIDE 0.5 MG: 1 INJECTION, SOLUTION INTRAMUSCULAR; INTRAVENOUS; SUBCUTANEOUS at 11:13

## 2018-02-12 RX ADMIN — LIDOCAINE HYDROCHLORIDE 80 MG: 20 INJECTION, SOLUTION INFILTRATION; PERINEURAL at 10:56

## 2018-02-12 RX ADMIN — PHENYLEPHRINE HYDROCHLORIDE 100 MCG: 10 INJECTION, SOLUTION INTRAMUSCULAR; INTRAVENOUS; SUBCUTANEOUS at 11:10

## 2018-02-12 RX ADMIN — SODIUM CHLORIDE, POTASSIUM CHLORIDE, SODIUM LACTATE AND CALCIUM CHLORIDE: 600; 310; 30; 20 INJECTION, SOLUTION INTRAVENOUS at 10:54

## 2018-02-12 RX ADMIN — CEFAZOLIN SODIUM 2 G: 2 INJECTION, SOLUTION INTRAVENOUS at 18:34

## 2018-02-12 RX ADMIN — ROCURONIUM BROMIDE 50 MG: 10 INJECTION INTRAVENOUS at 11:21

## 2018-02-12 RX ADMIN — SODIUM CHLORIDE: 9 INJECTION, SOLUTION INTRAVENOUS at 16:04

## 2018-02-12 RX ADMIN — Medication 100 MG: at 10:56

## 2018-02-12 RX ADMIN — METOPROLOL SUCCINATE 25 MG: 25 TABLET, EXTENDED RELEASE ORAL at 21:22

## 2018-02-12 RX ADMIN — SODIUM CHLORIDE, POTASSIUM CHLORIDE, SODIUM LACTATE AND CALCIUM CHLORIDE: 600; 310; 30; 20 INJECTION, SOLUTION INTRAVENOUS at 10:45

## 2018-02-12 RX ADMIN — CYCLOBENZAPRINE HYDROCHLORIDE 10 MG: 10 TABLET, FILM COATED ORAL at 18:33

## 2018-02-12 RX ADMIN — SODIUM CHLORIDE, POTASSIUM CHLORIDE, SODIUM LACTATE AND CALCIUM CHLORIDE: 600; 310; 30; 20 INJECTION, SOLUTION INTRAVENOUS at 11:52

## 2018-02-12 RX ADMIN — ACETAMINOPHEN 975 MG: 325 TABLET ORAL at 16:03

## 2018-02-12 RX ADMIN — ROCURONIUM BROMIDE 10 MG: 10 INJECTION INTRAVENOUS at 12:25

## 2018-02-12 RX ADMIN — ACETAMINOPHEN 1000 MG: 500 TABLET ORAL at 09:05

## 2018-02-12 RX ADMIN — PROPOFOL 50 MG: 10 INJECTION, EMULSION INTRAVENOUS at 12:06

## 2018-02-12 RX ADMIN — HYDROMORPHONE HYDROCHLORIDE 2 MG: 2 TABLET ORAL at 19:40

## 2018-02-12 RX ADMIN — DEXAMETHASONE SODIUM PHOSPHATE 20 MG: 10 INJECTION, SOLUTION INTRAMUSCULAR; INTRAVENOUS at 11:08

## 2018-02-12 RX ADMIN — SUGAMMADEX 200 MG: 100 INJECTION, SOLUTION INTRAVENOUS at 12:40

## 2018-02-12 RX ADMIN — MIDAZOLAM 2 MG: 1 INJECTION INTRAMUSCULAR; INTRAVENOUS at 10:47

## 2018-02-12 RX ADMIN — PROPOFOL 50 MG: 10 INJECTION, EMULSION INTRAVENOUS at 11:21

## 2018-02-12 RX ADMIN — CELECOXIB 400 MG: 200 CAPSULE ORAL at 09:07

## 2018-02-12 RX ADMIN — TRANEXAMIC ACID 1000 MG: 100 INJECTION, SOLUTION INTRAVENOUS at 11:19

## 2018-02-12 RX ADMIN — HYDROMORPHONE HYDROCHLORIDE 0.5 MG: 1 INJECTION, SOLUTION INTRAMUSCULAR; INTRAVENOUS; SUBCUTANEOUS at 12:52

## 2018-02-12 RX ADMIN — PROPOFOL 200 MG: 10 INJECTION, EMULSION INTRAVENOUS at 10:56

## 2018-02-12 RX ADMIN — GABAPENTIN 300 MG: 300 CAPSULE ORAL at 09:06

## 2018-02-12 RX ADMIN — FENTANYL CITRATE 100 MCG: 50 INJECTION, SOLUTION INTRAMUSCULAR; INTRAVENOUS at 10:47

## 2018-02-12 RX ADMIN — ONDANSETRON 4 MG: 2 INJECTION INTRAMUSCULAR; INTRAVENOUS at 12:40

## 2018-02-12 RX ADMIN — ROCURONIUM BROMIDE 10 MG: 10 INJECTION INTRAVENOUS at 12:06

## 2018-02-12 ASSESSMENT — LIFESTYLE VARIABLES: TOBACCO_USE: 1

## 2018-02-12 NOTE — IP AVS SNAPSHOT
[address] [facility]    [address] [place] [address]    Phone:  [phone]                                       After Visit Summary   2/12/2018    Markie Rico    MRN: [id number]           After Visit Summary Signature Page     I have received my discharge instructions, and my questions have been answered. I have discussed any challenges I see with this plan with the nurse or doctor.    ..........................................................................................................................................  Patient/Patient Representative Signature      ..........................................................................................................................................  Patient Representative Print Name and Relationship to Patient    ..................................................               ................................................  Date                                            Time    ..........................................................................................................................................  Reviewed by Signature/Title    ...................................................              ..............................................  Date                                                            Time

## 2018-02-12 NOTE — BRIEF OP NOTE
[facility] Orthopedic Brief Operative Note    Pre-operative diagnosis: Right hip arthritis   Post-operative diagnosis: Same   Procedure: Procedure(s):  ARTHROPLASTY HIP POSTERIOR APPROACH   Surgeon: Vineet Bedolla MD   Assistant(s): None   Anesthesia: General endotracheal anesthesia and Local anesthesia   Estimated blood loss: 500 ml   Total IV fluids: (See anesthesia record)   Drains: None   Specimens: None   Implants: See op note   Findings:    Complications: None   Weight bearing status: Partial weight bearing (80%)   Comments: See dictated operative report for full details

## 2018-02-12 NOTE — LETTER
Transition Communication Hand-off for Care Transitions to Next Level of Care Provider    Name: Markie Rico  MRN #: [id number]  Primary Care Provider: Alfred Abernathy  Primary Care MD Name: Elias Gonzalez  Primary Clinic: [address] [place] [address] [address] [id number]  [facility]  Reason for Hospitalization:  cristian, portable xray  S/P total hip arthroplasty  Admit Date/Time: 2/12/2018  8:42 AM  Discharge Date: 2/15/2018  Payor Source: Payor: COMMERCIAL / Plan: [facility] / Product Type: Indemnity /     Readmission Assessment Measure (FATEMEH) Risk Score/category: low           Reason for Communication Hand-off Referral: Fragility  Other total Right hip replacement     Discharge Plan:       Concern for non-adherence with plan of care:   Y/N :none  Discharge Needs Assessment:  Needs       Most Recent Value    Equipment Currently Used at Home cane, straight [2 canes in each hand.]    Transportation Available car, family or friend will provide          Follow-up specialty is recommended: Yes    Follow-up plan:  Future Appointments  Date Time Provider Department Center   2/26/2018 1:10 PM Vineet Bedolla MD [facility]       Any outstanding tests or procedures:        Referrals     Future Labs/Procedures    Home Care PT Referral for Hospital Discharge     Comments:    PT to eval and treat  Based on eval OK for up to 2-3 times weekly for next 2-3 weeks  S/p right Total Hip Arthroplasty     Your provider has ordered home care - physical therapy. If you have not been contacted within 2 days of your discharge please call the department phone number listed on the top of this document.            Key Recommendations:  Go to any follow up appointments     Shruthi Mane  Social Work Student     AVS/Discharge Summary is the source of truth; this is a helpful guide for improved communication of patient story

## 2018-02-12 NOTE — OR NURSING
Transfer from  pacu to Room 257  Transferred to bed via bed          S: 56 y/o male  S/P Right hip (RTHA)   Anesthesia Type:  General, ET tube       Surgeon:  Dr. Bedolla       Allergies:  NKA       DNR:   B:  Pertinent Medical History   Past Medical History:   Diagnosis Date     Diverticulitis      Old myocardial infarction     s/p successful emergent revascualrization, PTCR/stent at the [facility] for acute MI     Old myocardial infarction 06/07/11    Non-Q-wave MI-[place]     Other and unspecified hyperlipidemia      Tobacco use disorder              Surgical History:    Past Surgical History:   Procedure Laterality Date     C ANESTH,DX ARTHROSCOPIC PROC KNEE JOINT       C COLOSTOMY       C UNLISTED LAPAROSCOPY PROC,INTESTINE  7/25/2003    Exploratory Lap. Lysis of adhesions. Sigmoid colostomy takedown and stapled coloproctostomy.     HC PTCA SINGLE VESSEL      2 stents       A:  EBL: 500        IVF:  3000        UOP:  200        NPO:   No, tolerating ice chips        Vomiting:  No nausea or vomiting        Drainage: dressing dry/Skin Integrity: intact         RFO: yes, bryant catheter        SSI Patient?  Yes, total right hip        Brace/sling/equipment:  Yes-abductor pillow between legs       See PACU record for ongoing assessment, vital signs and pain assessment.       Telephone Report called to Ginger GUILLAUME: Post-Op vitals and assessments as ordered/indicated per patient's condition.       Pain managed with IV Fentanyl in PACU-See MAR       Pt. History hypertension. Stayed elevated in PACU       Follow Post-Op orders and notify Physician prn.       Continue to involve patient/family in plan of care and discharge planning.       Reinforce Pre-Operative education.       Implement skin safety interventions as appropriate.    Name: Mirtha Phan

## 2018-02-12 NOTE — ANESTHESIA PREPROCEDURE EVALUATION
Anesthesia Evaluation     . Pt has had prior anesthetic. Type: MAC and General           ROS/MED HX    ENT/Pulmonary:     (+)tobacco use, Past use , . .    Neurologic:  - neg neurologic ROS     Cardiovascular:     (+) Dyslipidemia, hypertension--CAD, --stent,2001,2010  . : . . . :. . Previous cardiac testing Echodate:8/1/2016results:EF 60-65%date: results:ECG reviewed date:1/2018 results:NSR with right BBB date: results:          METS/Exercise Tolerance:     Hematologic:  - neg hematologic  ROS       Musculoskeletal:   (+) arthritis, , , -       GI/Hepatic:  - neg GI/hepatic ROS       Renal/Genitourinary:  - ROS Renal section negative       Endo:     (+) Obesity, .      Psychiatric:  - neg psychiatric ROS       Infectious Disease:  - neg infectious disease ROS       Malignancy:      - no malignancy   Other:    - neg other ROS                 Physical Exam  Normal systems: cardiovascular, pulmonary and dental    Airway   Mallampati: II  TM distance: >3 FB  Neck ROM: full    Dental     Cardiovascular   Rhythm and rate: regular and normal      Pulmonary                     Anesthesia Plan      History & Physical Review  History and physical reviewed and following examination; no interval change.    ASA Status:  3 .    NPO Status:  > 8 hours    Plan for General and ETT with Intravenous induction.   PONV prophylaxis:  Ondansetron (or other 5HT-3) and Dexamethasone or Solumedrol       Postoperative Care  Postoperative pain management:  IV analgesics.      Consents  Anesthetic plan, risks, benefits and alternatives discussed with:  Patient.  Use of blood products discussed: No .   .                          .

## 2018-02-12 NOTE — IP AVS SNAPSHOT
"          [address] MEDICAL SURGICAL: [phone]                                              INTERAGENCY TRANSFER FORM - PHYSICIAN ORDERS   2018                    Hospital Admission Date: 2018  JESSICA PHILLIPS   : 1960  Sex: Male        Attending Provider: Vineet Bedolla MD     Allergies:  No Known Allergies    Infection:  None   Service:  ORTHOPEDICS    Ht:  1.715 m (5' 7.5\")   Wt:  129.7 kg (286 lb)   Admission Wt:  --    BMI:  44.13 kg/m 2   BSA:  2.49 m 2            Patient PCP Information     Provider PCP Type    Alfred Seth Abernathy MD General      ED Clinical Impression     Diagnosis Description Comment Added By Time Added    Status post total replacement of hip, unspecified laterality [Z96.649] Status post total replacement of hip, unspecified laterality [Z96.649]  Aide Dias PA-C 2018 10:19 AM    Deep vein thrombosis (DVT) prophylaxis prescribed at discharge [Z78.9] Deep vein thrombosis (DVT) prophylaxis prescribed at discharge [Z78.9]  Aide Dias PA-C 2018 10:21 AM    Status post total replacement of right hip [Z96.641] Status post total replacement of right hip [Z96.641]  Aide Dias PA-C 2018 11:28 AM      Hospital Problems as of 2/15/2018              Priority Class Noted POA    Hyperlipidemia LDL goal <100 Medium  2012 Yes    Morbid obesity (H) Medium  2012 Yes    CAD (coronary artery disease) Medium  2012 Yes    Benign essential hypertension Medium  2017 Yes    S/P total hip arthroplasty Medium  2018 Yes      Non-Hospital Problems as of 2/15/2018              Priority Class Noted    History of acute myocardial infarction of inferior wall Medium  2012    Multiple joint pain Medium  2012    Medication side effect Medium  2012    Primary osteoarthritis of both hips Medium  2017      Code Status History     Date Active Date Inactive Code Status Order ID Comments User Context    " 2/14/2018 11:28 AM  Full Code [id number]  Aide Dias PA-C Outpatient    2/12/2018  3:33 PM 2/14/2018 11:28 AM Full Code [id number]  Vineet Bedolla MD Inpatient         Medication Review      START taking        Dose / Directions Comments    acetaminophen 325 MG tablet   Commonly known as:  TYLENOL        Dose:  650 mg   Take 2 tablets (650 mg) by mouth every 4 hours as needed for other (multimodal surgical pain management along with NSAIDS and opioid medication as indicated based on pain control and physical function.)   Quantity:  100 tablet   Refills:  0        aspirin - buffered 325 MG Tabs tablet   Commonly known as:  ASCRIPTIN   Used for:  Deep vein thrombosis (DVT) prophylaxis prescribed at discharge        Dose:  325 mg   Take 1 tablet (325 mg) by mouth every 12 hours   Quantity:  60 tablet   Refills:  0        cyclobenzaprine 10 MG tablet   Commonly known as:  FLEXERIL        Dose:  10 mg   Take 1 tablet (10 mg) by mouth 3 times daily as needed for muscle spasms   Quantity:  60 tablet   Refills:  0        HYDROmorphone 2 MG tablet   Commonly known as:  DILAUDID        Dose:  2-4 mg   Take 1-2 tablets (2-4 mg) by mouth every 4 hours as needed for other (pain control or improvement in physical function. Hold dose for analgesic side effects.)   Quantity:  60 tablet   Refills:  0        hydrOXYzine 25 MG tablet   Commonly known as:  ATARAX        Dose:  25 mg   Take 1 tablet (25 mg) by mouth every 6 hours as needed for itching or other (augment pain control)   Quantity:  60 tablet   Refills:  0        order for DME        Equipment being ordered: Walker () Treatment Diagnosis: s/p joint replacement   Quantity:  1 Device   Refills:  0        senna-docusate 8.6-50 MG per tablet   Commonly known as:  SENOKOT-S;PERICOLACE        Dose:  1 tablet   Take 1 tablet by mouth 2 times daily as needed for constipation   Quantity:  100 tablet   Refills:  0          CONTINUE these medications which may have  CHANGED, or have new prescriptions. If we are uncertain of the size of tablets/capsules you have at home, strength may be listed as something that might have changed.        Dose / Directions Comments    metoprolol succinate 50 MG 24 hr tablet   Commonly known as:  TOPROL XL   This may have changed:    - how much to take  - when to take this   Used for:  History of acute myocardial infarction of inferior wall, CAD (coronary artery disease)        Dose:  50 mg   Take 1 tablet by mouth daily.   Quantity:  90 tablet   Refills:  3    - Profile Rx: patient will contact pharmacy when needed    -          CONTINUE these medications which have NOT CHANGED        Dose / Directions Comments    atorvastatin 80 MG tablet   Commonly known as:  LIPITOR        Dose:  80 mg   Take 80 mg by mouth daily   Refills:  0        CAYENNE PO        Refills:  0        CENTRUM MEN PO        Dose:  1 tablet   Take 1 tablet by mouth daily   Refills:  0        Cinnamon Bark Powd        Refills:  0        gabapentin 300 MG capsule   Commonly known as:  NEURONTIN   Used for:  Pre-op exam        Dose:  300 mg   Take 1 capsule (300 mg) by mouth daily   Quantity:  30 capsule   Refills:  1        lisinopril 10 MG tablet   Commonly known as:  PRINIVIL/ZESTRIL   Used for:  History of acute myocardial infarction of inferior wall, CAD (coronary artery disease)        Dose:  10 mg   Take 1 tablet by mouth daily. Take 10 mg by mouth daily.   Quantity:  90 tablet   Refills:  3    - Profile Rx: patient will contact pharmacy when needed    -        MAGNESIUM OXIDE PO        Dose:  400 mg   Take 400 mg by mouth daily   Refills:  0        meloxicam 7.5 MG tablet   Commonly known as:  MOBIC   Used for:  Pre-op exam        Dose:  7.5 mg   Take 1 tablet (7.5 mg) by mouth daily   Quantity:  30 tablet   Refills:  1        NIACIN (ANTIHYPERLIPIDEMIC) PO        Dose:  500 mg   Take 500 mg by mouth At Bedtime   Refills:  0        nitroGLYcerin 0.4 MG sublingual tablet    Commonly known as:  NITROSTAT   Used for:  History of acute myocardial infarction of inferior wall, CAD (coronary artery disease)        Dose:  0.4 mg   Place 1 tablet under the tongue every 5 minutes as needed.   Quantity:  15 tablet   Refills:  3        POTASSIUM CHLORIDE CR PO        Dose:  10 mEq   Take 10 mEq by mouth daily.   Refills:  0        TURMERIC PO        Refills:  0        Vitamin D (Cholecalciferol) 400 UNITS Tabs        Dose:  1 tablet   Take 1 tablet by mouth daily   Refills:  0          STOP taking     ALEVE PO           glucosamine-chondroitin 500-400 MG Caps per capsule           TRAMADOL HCL PO                     Further instructions from your care team       Total Hip Replacement Discharge Instructions    [phone]  [facility] Service Line for issues or concerns    Discharge prescriptions:  Dilaudid 2 mg tablets:  Take one or two tablets every 4 hours as needed for pain.  Gabapentin 300 mg tablet:  Take one tablet nightly for pain (30 days)  Mobic 7.5 mg tablet:  Take one tablet daily for pain (30 days)  Flexeril 10 mg tablet:  Take one tablet up to 3 times daily for pain from spasms  Atarax 25 mg tablet:  Take one-two tablets every 6 hours as needed to augment pain control or for itching.   Stool Softener:    You may use any stool softener you are familiar with. We have suggested over the counter Sennakot as a fall back.  Tylenol :  You may take one or two tablets (325mg) every six hours as needed.  Aspirin 325 mg:  Take one tablet twice daily for blood thinning and prevention of blood clots (30 days)    General Care:  After surgery you may feel tired/sleepy. This is normal. If you have any question along the way please contact the office. If you feel it is an issue cannot wait for normal office hours, contact the on-call physician. You should not drive or operate machines/equipment until released by your physician to do so.     Bandages:    It s normal to have some blood-tinged  fluid on your bandages, this may occur for a few days after being sent home from the hospital. Leave the dressing placed in the hospital for the next 10 days.  This is a water resistant dressing so you may shower over this.  This will be removed in clinic along with your staples. You may also notice a few drops of blood from your incision as you begin to move more this is normal. Keep the area clean and dry.      Bathing:  Do not submerge your incision in water such as a bath or pool. It is ok to shower when you get home over the aquacell water resistant bandage.  You may find in comfortable to get a shower chair for the first month while you continue to heal and get stronger.     Follow up:  Your follow up appointment should already be scheduled. If it s not, please call the office to verify an appointment 10-14 days after surgery..     Diet:  You may not have a full appetite at discharge this should get better. Progress to bland foods such as crackers and bread and finally to your normal diet if you have no problems.  Avoid alcohol when taking narcotic pain medications.      Pain control:  Take your pain medications as prescribed. These medications may make you sleepy. Do not drive, operate equipment, or drink alcohol when taking these.  You may take Tylenol (Generic name is acetaminophen) as directed on the bottle for additional relief or in place of the prescribed pain medications as your pain gets better.  If the medications cause a reaction such as nausea or skin rash, stop taking them and contact your doctor. Please plan accordingly, pain medications will not be re-filled on the weekends or at night. Call the office during the day if you need more medications.    Blood thinner:  It is very important to take the Aspirin 325 mg twice a day to help prevent blood clots. No medication is perfect, so if you notice a sudden onset of pain/swelling in your calf area call your doctor. If you notice a sudden onset of  troubles breathing and/or chest pain call 911.     Icing:  It is common for some swelling, aching and stiffness to occur for awhile after a hip replacement. Apply for 20 minutes at a time. For the first 1-2 weeks apply ice 2-3 x a day or more after therapy.    Walker/crutches:  Use a walker/crutches when you go home. You will transition to the use of a cane and finally to no additional support.     Physical Therapy:  The success of your hip replacement is based on doing physical therapy. You will have some pain and discomfort along the way. If you feel your pain is limiting your progress make sure to take some pain medication prior to your therapy session. If your pain medications are not working talk to your surgeon.   For the first 1-2 weeks after going home you will have in-home physical therapy. The goal is to work on walking and feeling steady.  Usually after your first post-operative follow up you will move to out-patient physical therapy.     Activity:  Unless otherwise instructed, you can weight bear as tolerated with a walker/cane. For 6 weeks follow these listed precautions:  Do not bend the operated hip past 90 degrees (for example sitting in a low couch or toilet). Use a raised seat on your toilet for 6-8 weeks. If you find yourself in a low seat, keep your legs apart (don t let the knees touch) and kneecaps facing forward when getting up. Please ask for help.    Do not cross the midline of your body with the operated leg.  Do not rotate the operated leg inward, your toes and kneecap should point toward the ceiling when in bed.       Normal findings after surgery:  Numbness and tenderness around the incisions.   You may have bruising around the incisions and down the thigh.   Low grade fevers less than 100.5 degrees Fahrenheit are normal.   You will have some increased pain after your therapy sessions.     When to call the Office:  Temperature greater than 101.5 degrees Fahreheit.  Fever, chills, and  increasing pain in the hip.  Excessive drainage from the incisions that include bright red blood.  Drainage from the incisions sites that appear yellow, pus-like, or foul smelling.  Increasing pain the hip not relieved by the prescribed pain medications or ice.  Persistent nausea or vomiting not helped by the Zofran.  Increased pain or swelling in your calf area (in back above your ankle) that wasn t there when in the hospital.  Any other effects you feel are significant.  Call 911 if you experience any chest pain and/or shortness or breath.                                                                   EDEMA CONTROL  (Do not let your leg swell!!)    Swelling in your injured, or recently operated on, extremity is one of the worst things YOU can let happen.     Swelling will:   1)  Lead to more pain and stiffness  2)  Causes tissues to be unhealthy.  Bacteria like this.  3)  Puts you at risk for deadly blood clots.    Things YOU must do to control the EDEMA (swelling) are as follows:      Compress the extremity in a uniform way.  Use a compressive wrap from toes to your groin or wrist to shoulder.  Not needed at night.    Icing of the injured area soothes the angry tissues.    YOU MUST ELEVATE THE INJURED EXTREMITY ABOVE YOUR HEART AS MUCH AS POSSIBLE!!   A foot stool or the arm rest is not enough.  Edema is water and water flows down hill.  Your heart is the pump so you are getting the water back to the pump.    I cannot do this for you.  Only you and your help at home can get this done.    THANKS!!                      Summary of Visit     Reason for your hospital stay       S/p joint replacement             After Care     Activity       Your activity upon discharge: activity as tolerated       Diet       Follow this diet upon discharge: pre-operative diet       Wound care and dressings       Instructions to care for your wound at home: may get dressing wet in shower but do not soak or scrub.  Keep aquacell in  place till follow up.  Place compression (TEDs) after hygiene.  Can remove compression at night.             Referrals     Home Care PT Referral for Hospital Discharge       PT to eval and treat  Based on eval OK for up to 2-3 times weekly for next 2-3 weeks  S/p right Total Hip Arthroplasty     Your provider has ordered home care - physical therapy. If you have not been contacted within 2 days of your discharge please call the department phone number listed on the top of this document.             Your next 10 appointments already scheduled     Feb 26, 2018  1:10 PM CST   [facility]   [facility]    [address] [place] [id number] [phone]              Follow-Up Appointment Instructions     Future Labs/Procedures    Follow-up and recommended labs and tests     Comments:    Follow up with Dr. Bedolla in 10-14 days.  Appointment was already made pre-operatively      Follow-Up Appointment Instructions     Follow-up and recommended labs and tests       Follow up with Dr. Bedolla in 10-14 days.  Appointment was already made pre-operatively             Statement of Approval     Ordered          02/15/18 0923  I have reviewed and agree with all the recommendations and orders detailed in this document.  EFFECTIVE NOW     Approved and electronically signed by:  Aide Dias PA-C

## 2018-02-12 NOTE — IP AVS SNAPSHOT
MRN:[id number]                      After Visit Summary   2/12/2018    Markie Rico    MRN: [id number]           Thank you!     Thank you for choosing [facility] for your care. Our goal is always to provide you with excellent care. Hearing back from our patients is one way we can continue to improve our services. Please take a few minutes to complete the written survey that you may receive in the mail after you visit with us. Thank you!        Patient Information     Date Of Birth          1960        Designated Caregiver       Most Recent Value    Caregiver    Will someone help with your care after discharge? yes    Name of designated caregiver Farida Rico    Phone number of caregiver [phone]    Caregiver address [address] [place] [id number]      About your hospital stay     You were admitted on:  February 12, 2018 You last received care in the:  [address] [facility]    You were discharged on:  February 15, 2018        Reason for your hospital stay       S/p joint replacement                  Who to Call     For medical emergencies, please call 911.  For non-urgent questions about your medical care, please call your primary care provider or clinic, [phone]  For questions related to your surgery, please call your surgery clinic        Attending Provider     Provider Specialty    Vineet Bedolla MD Orthopedics       Primary Care Provider Office Phone # Fax #    Alfred Seth Abernathy [phone]      After Care Instructions     Activity       Your activity upon discharge: activity as tolerated            Diet       Follow this diet upon discharge: pre-operative diet            Wound care and dressings       Instructions to care for your wound at home: may get dressing wet in shower but do not soak or scrub.  Keep aquacell in place till follow up.  Place compression (TEDs) after hygiene.  Can remove compression at night.                   Follow-up Appointments     Follow-up and recommended labs and tests       Follow up with Dr. Bedolla in 10-14 days.  Appointment was already made pre-operatively                  Your next 10 appointments already scheduled     Feb 26, 2018  1:10 PM CST   [facility] with [facility]   [facility]    [address] [place] [id number] [phone]              Additional Services     Home Care PT Referral for Hospital Discharge       PT to eval and treat  Based on eval OK for up to 2-3 times weekly for next 2-3 weeks  S/p right Total Hip Arthroplasty     Your provider has ordered home care - physical therapy. If you have not been contacted within 2 days of your discharge please call the department phone number listed on the top of this document.                  Further instructions from your care team       Total Hip Replacement Discharge Instructions    [phone]  Bone and Joint Service Line for issues or concerns    Discharge prescriptions:  Dilaudid 2 mg tablets:  Take one or two tablets every 4 hours as needed for pain.  Gabapentin 300 mg tablet:  Take one tablet nightly for pain (30 days)  Mobic 7.5 mg tablet:  Take one tablet daily for pain (30 days)  Flexeril 10 mg tablet:  Take one tablet up to 3 times daily for pain from spasms  Atarax 25 mg tablet:  Take one-two tablets every 6 hours as needed to augment pain control or for itching.   Stool Softener:    You may use any stool softener you are familiar with. We have suggested over the counter Sennakot as a fall back.  Tylenol :  You may take one or two tablets (325mg) every six hours as needed.  Aspirin 325 mg:  Take one tablet twice daily for blood thinning and prevention of blood clots (30 days)    General Care:  After surgery you may feel tired/sleepy. This is normal. If you have any question along the way please contact the office. If you feel it is an issue cannot wait for normal  office hours, contact the on-call physician. You should not drive or operate machines/equipment until released by your physician to do so.     Bandages:    It s normal to have some blood-tinged fluid on your bandages, this may occur for a few days after being sent home from the hospital. Leave the dressing placed in the hospital for the next 10 days.  This is a water resistant dressing so you may shower over this.  This will be removed in clinic along with your staples. You may also notice a few drops of blood from your incision as you begin to move more this is normal. Keep the area clean and dry.      Bathing:  Do not submerge your incision in water such as a bath or pool. It is ok to shower when you get home over the aquacell water resistant bandage.  You may find in comfortable to get a shower chair for the first month while you continue to heal and get stronger.     Follow up:  Your follow up appointment should already be scheduled. If it s not, please call the office to verify an appointment 10-14 days after surgery..     Diet:  You may not have a full appetite at discharge this should get better. Progress to bland foods such as crackers and bread and finally to your normal diet if you have no problems.  Avoid alcohol when taking narcotic pain medications.      Pain control:  Take your pain medications as prescribed. These medications may make you sleepy. Do not drive, operate equipment, or drink alcohol when taking these.  You may take Tylenol (Generic name is acetaminophen) as directed on the bottle for additional relief or in place of the prescribed pain medications as your pain gets better.  If the medications cause a reaction such as nausea or skin rash, stop taking them and contact your doctor. Please plan accordingly, pain medications will not be re-filled on the weekends or at night. Call the office during the day if you need more medications.    Blood thinner:  It is very important to take the Aspirin  325 mg twice a day to help prevent blood clots. No medication is perfect, so if you notice a sudden onset of pain/swelling in your calf area call your doctor. If you notice a sudden onset of troubles breathing and/or chest pain call 911.     Icing:  It is common for some swelling, aching and stiffness to occur for awhile after a hip replacement. Apply for 20 minutes at a time. For the first 1-2 weeks apply ice 2-3 x a day or more after therapy.    Walker/crutches:  Use a walker/crutches when you go home. You will transition to the use of a cane and finally to no additional support.     Physical Therapy:  The success of your hip replacement is based on doing physical therapy. You will have some pain and discomfort along the way. If you feel your pain is limiting your progress make sure to take some pain medication prior to your therapy session. If your pain medications are not working talk to your surgeon.   For the first 1-2 weeks after going home you will have in-home physical therapy. The goal is to work on walking and feeling steady.  Usually after your first post-operative follow up you will move to out-patient physical therapy.     Activity:  Unless otherwise instructed, you can weight bear as tolerated with a walker/cane. For 6 weeks follow these listed precautions:  Do not bend the operated hip past 90 degrees (for example sitting in a low couch or toilet). Use a raised seat on your toilet for 6-8 weeks. If you find yourself in a low seat, keep your legs apart (don t let the knees touch) and kneecaps facing forward when getting up. Please ask for help.    Do not cross the midline of your body with the operated leg.  Do not rotate the operated leg inward, your toes and kneecap should point toward the ceiling when in bed.       Normal findings after surgery:  Numbness and tenderness around the incisions.   You may have bruising around the incisions and down the thigh.   Low grade fevers less than 100.5 degrees  Fahrenheit are normal.   You will have some increased pain after your therapy sessions.     When to call the Office:  Temperature greater than 101.5 degrees Fahreheit.  Fever, chills, and increasing pain in the hip.  Excessive drainage from the incisions that include bright red blood.  Drainage from the incisions sites that appear yellow, pus-like, or foul smelling.  Increasing pain the hip not relieved by the prescribed pain medications or ice.  Persistent nausea or vomiting not helped by the Zofran.  Increased pain or swelling in your calf area (in back above your ankle) that wasn t there when in the hospital.  Any other effects you feel are significant.  Call 911 if you experience any chest pain and/or shortness or breath.                                                                   EDEMA CONTROL  (Do not let your leg swell!!)    Swelling in your injured, or recently operated on, extremity is one of the worst things YOU can let happen.     Swelling will:   1)  Lead to more pain and stiffness  2)  Causes tissues to be unhealthy.  Bacteria like this.  3)  Puts you at risk for deadly blood clots.    Things YOU must do to control the EDEMA (swelling) are as follows:      Compress the extremity in a uniform way.  Use a compressive wrap from toes to your groin or wrist to shoulder.  Not needed at night.    Icing of the injured area soothes the angry tissues.    YOU MUST ELEVATE THE INJURED EXTREMITY ABOVE YOUR HEART AS MUCH AS POSSIBLE!!   A foot stool or the arm rest is not enough.  Edema is water and water flows down hill.  Your heart is the pump so you are getting the water back to the pump.    I cannot do this for you.  Only you and your help at home can get this done.    THANKS!!                      Pending Results     No orders found from 2/10/2018 to 2/13/2018.            Statement of Approval     Ordered          02/15/18 0923  I have reviewed and agree with all the recommendations and orders detailed in  this document.  EFFECTIVE NOW     Approved and electronically signed by:  Aide Dias PA-C             Admission Information     Date & Time Provider Department Dept. Phone    2/12/2018 Vineet Bedolla MD [address] [facility] [phone]      Your Vitals Were     Blood Pressure Pulse Temperature Respirations Pulse Oximetry       120/58 (BP Location: Right arm) 71 97.6  F (36.4  C) (Oral) 16 95%       MyChart Information     [email or web address] gives you secure access to your electronic health record. If you see a primary care provider, you can also send messages to your care team and make appointments. If you have questions, please call your primary care clinic.  If you do not have a primary care provider, please call [phone] and they will assist you.        Care EveryWhere ID     This is your Care EveryWhere ID. This could be used by other organizations to access your [facility] medical records  [id number]        Equal Access to Services     ABRAHAM MARTINEZ : Hadii ivy Selby, waaxda chris, qaybta kaalmada kirby, lela root. So [facility] [phone].    ATENCIÓN: Si habla español, tiene a martin disposición servicios gratuitos de asistencia lingüística. Llame al [phone].    We comply with applicable federal civil rights laws and [place] laws. We do not discriminate on the basis of race, color, national origin, age, disability, sex, sexual orientation, or gender identity.               Review of your medicines      START taking        Dose / Directions    acetaminophen 325 MG tablet   Commonly known as:  TYLENOL        Dose:  650 mg   Take 2 tablets (650 mg) by mouth every 4 hours as needed for other (multimodal surgical pain management along with NSAIDS and opioid medication as indicated based on pain control and physical function.)   Quantity:  100 tablet   Refills:  0       aspirin - buffered 325 MG Tabs tablet   Commonly known as:  ASCRIPTIN    Used for:  Deep vein thrombosis (DVT) prophylaxis prescribed at discharge        Dose:  325 mg   Take 1 tablet (325 mg) by mouth every 12 hours   Quantity:  60 tablet   Refills:  0       cyclobenzaprine 10 MG tablet   Commonly known as:  FLEXERIL        Dose:  10 mg   Take 1 tablet (10 mg) by mouth 3 times daily as needed for muscle spasms   Quantity:  60 tablet   Refills:  0       HYDROmorphone 2 MG tablet   Commonly known as:  DILAUDID        Dose:  2-4 mg   Take 1-2 tablets (2-4 mg) by mouth every 4 hours as needed for other (pain control or improvement in physical function. Hold dose for analgesic side effects.)   Quantity:  60 tablet   Refills:  0       hydrOXYzine 25 MG tablet   Commonly known as:  ATARAX        Dose:  25 mg   Take 1 tablet (25 mg) by mouth every 6 hours as needed for itching or other (augment pain control)   Quantity:  60 tablet   Refills:  0       order for DME        Equipment being ordered: Walker () Treatment Diagnosis: s/p joint replacement   Quantity:  1 Device   Refills:  0       senna-docusate 8.6-50 MG per tablet   Commonly known as:  SENOKOT-S;PERICOLACE        Dose:  1 tablet   Take 1 tablet by mouth 2 times daily as needed for constipation   Quantity:  100 tablet   Refills:  0         CONTINUE these medicines which may have CHANGED, or have new prescriptions. If we are uncertain of the size of tablets/capsules you have at home, strength may be listed as something that might have changed.        Dose / Directions    metoprolol succinate 50 MG 24 hr tablet   Commonly known as:  TOPROL XL   This may have changed:    - how much to take  - when to take this   Used for:  History of acute myocardial infarction of inferior wall, CAD (coronary artery disease)        Dose:  50 mg   Take 1 tablet by mouth daily.   Quantity:  90 tablet   Refills:  3         CONTINUE these medicines which have NOT CHANGED        Dose / Directions    atorvastatin 80 MG tablet   Commonly known as:   LIPITOR        Dose:  80 mg   Take 80 mg by mouth daily   Refills:  0       CAYENNE PO        Refills:  0       CENTRUM MEN PO        Dose:  1 tablet   Take 1 tablet by mouth daily   Refills:  0       Cinnamon Bark Powd        Refills:  0       gabapentin 300 MG capsule   Commonly known as:  NEURONTIN   Used for:  Pre-op exam        Dose:  300 mg   Take 1 capsule (300 mg) by mouth daily   Quantity:  30 capsule   Refills:  1       lisinopril 10 MG tablet   Commonly known as:  PRINIVIL/ZESTRIL   Used for:  History of acute myocardial infarction of inferior wall, CAD (coronary artery disease)        Dose:  10 mg   Take 1 tablet by mouth daily. Take 10 mg by mouth daily.   Quantity:  90 tablet   Refills:  3       MAGNESIUM OXIDE PO        Dose:  400 mg   Take 400 mg by mouth daily   Refills:  0       meloxicam 7.5 MG tablet   Commonly known as:  MOBIC   Used for:  Pre-op exam        Dose:  7.5 mg   Take 1 tablet (7.5 mg) by mouth daily   Quantity:  30 tablet   Refills:  1       NIACIN (ANTIHYPERLIPIDEMIC) PO        Dose:  500 mg   Take 500 mg by mouth At Bedtime   Refills:  0       nitroGLYcerin 0.4 MG sublingual tablet   Commonly known as:  NITROSTAT   Used for:  History of acute myocardial infarction of inferior wall, CAD (coronary artery disease)        Dose:  0.4 mg   Place 1 tablet under the tongue every 5 minutes as needed.   Quantity:  15 tablet   Refills:  3       POTASSIUM CHLORIDE CR PO        Dose:  10 mEq   Take 10 mEq by mouth daily.   Refills:  0       TURMERIC PO        Refills:  0       Vitamin D (Cholecalciferol) 400 UNITS Tabs        Dose:  1 tablet   Take 1 tablet by mouth daily   Refills:  0         STOP taking     ALEVE PO           glucosamine-chondroitin 500-400 MG Caps per capsule           TRAMADOL HCL PO                Where to get your medicines      These medications were sent to [facility] [place], [place] - [address]  [address] [facility] [address]     Phone:   [phone]     aspirin - buffered 325 MG Tabs tablet    cyclobenzaprine 10 MG tablet    hydrOXYzine 25 MG tablet    senna-docusate 8.6-50 MG per tablet         Some of these will need a paper prescription and others can be bought over the counter. Ask your nurse if you have questions.     Bring a paper prescription for each of these medications     acetaminophen 325 MG tablet    HYDROmorphone 2 MG tablet    order for DME                Protect others around you: Learn how to safely use, store and throw away your medicines at www.disposemymeds.org.        Information about OPIOIDS     PRESCRIPTION OPIOIDS: WHAT YOU NEED TO KNOW    Prescription opioids can be used to help relieve moderate to severe pain and are often prescribed following a surgery or injury, or for certain health conditions. These medications can be an important part of treatment but also come with serious risks. It is important to work with your health care provider to make sure you are getting the safest, most effective care.    WHAT ARE THE RISKS AND SIDE EFFECTS OF OPIOID USE?  Prescription opioids carry serious risks of addiction and overdose, especially with prolonged use. An opioid overdose, often marked by slowed breathing can cause sudden death. The use of prescription opioids can have a number of side effects as well, even when taken as directed:      Tolerance - meaning you might need to take more of a medication for the same pain relief    Physical dependence - meaning you have symptoms of withdrawal when a medication is stopped    Increased sensitivity to pain    Constipation    Nausea, vomiting, and dry mouth    Sleepiness and dizziness    Confusion    Depression    Low levels of testosterone that can result in lower sex drive, energy, and strength    Itching and sweating    RISKS ARE GREATER WITH:    History of drug misuse, substance use disorder, or overdose    Mental health conditions (such as depression or anxiety)    Sleep  apnea    Older age (65 years or older)    Pregnancy    Avoid alcohol while taking prescription opioids.   Also, unless specifically advised by your health care provider, medications to avoid include:    Benzodiazepines (such as Xanax or Valium)    Muscle relaxants (such as Soma or Flexeril)    Hypnotics (such as Ambien or Lunesta)    Other prescription opioids    KNOW YOUR OPTIONS:  Talk to your health care provider about ways to manage your pain that do not involve prescription opioids. Some of these options may actually work better and have fewer risks and side effects:    Pain relievers such as acetaminophen, ibuprofen, and naproxen    Some medications that are also used for depression or seizures    Physical therapy and exercise    Cognitive behavioral therapy, a psychological, goal-directed approach, in which patients learn how to modify physical, behavioral, and emotional triggers of pain and stress    IF YOU ARE PRESCRIBED OPIOIDS FOR PAIN:    Never take opioids in greater amounts or more often than prescribed    Follow up with your primary health care provider and work together to create a plan on how to manage your pain.    Talk about ways to help manage your pain that do not involve prescription opioids    Talk about all concerns and side effects    Help prevent misuse and abuse    Never sell or share prescription opioids    Never use another person's prescription opioids    Store prescription opioids in a secure place and out of reach of others (this may include visitors, children, friends, and family)    Visit www.cdc.gov/drugoverdose to learn about risks of opioid abuse and overdose    If you believe you may be struggling with addiction, tell your health care provider and ask for guidance or call [facility]'s National Helpline at 8-280-048-HELP    LEARN MORE / www.cdc.gov/drugoverdose/prescribing/guideline.html    Safely dispose of unused prescription opioids: Find your local drug take-back programs and  more information about the importance of safe disposal at www.doseofreality.[place].gov             Medication List: This is a list of all your medications and when to take them. Check marks below indicate your daily home schedule. Keep this list as a reference.      Medications           Morning Afternoon Evening Bedtime As Needed    acetaminophen 325 MG tablet   Commonly known as:  TYLENOL   Take 2 tablets (650 mg) by mouth every 4 hours as needed for other (multimodal surgical pain management along with NSAIDS and opioid medication as indicated based on pain control and physical function.)   Last time this was given:  975 mg on 2/15/2018  8:27 AM                                aspirin - buffered 325 MG Tabs tablet   Commonly known as:  ASCRIPTIN   Take 1 tablet (325 mg) by mouth every 12 hours   Last time this was given:  325 mg on 2/15/2018  8:27 AM                                atorvastatin 80 MG tablet   Commonly known as:  LIPITOR   Take 80 mg by mouth daily                                CAYENNE PO                                CENTRUM MEN PO   Take 1 tablet by mouth daily                                Cinnamon Bark Powd                                cyclobenzaprine 10 MG tablet   Commonly known as:  FLEXERIL   Take 1 tablet (10 mg) by mouth 3 times daily as needed for muscle spasms   Last time this was given:  10 mg on 2/14/2018 10:21 PM                                gabapentin 300 MG capsule   Commonly known as:  NEURONTIN   Take 1 capsule (300 mg) by mouth daily   Last time this was given:  300 mg on 2/15/2018  8:27 AM                                HYDROmorphone 2 MG tablet   Commonly known as:  DILAUDID   Take 1-2 tablets (2-4 mg) by mouth every 4 hours as needed for other (pain control or improvement in physical function. Hold dose for analgesic side effects.)   Last time this was given:  4 mg on 2/15/2018  8:28 AM                                hydrOXYzine 25 MG tablet   Commonly known as:  ATARAX    Take 1 tablet (25 mg) by mouth every 6 hours as needed for itching or other (augment pain control)   Last time this was given:  25 mg on 2/14/2018  9:30 AM                                lisinopril 10 MG tablet   Commonly known as:  PRINIVIL/ZESTRIL   Take 1 tablet by mouth daily. Take 10 mg by mouth daily.   Last time this was given:  10 mg on 2/15/2018  8:27 AM                                MAGNESIUM OXIDE PO   Take 400 mg by mouth daily   Last time this was given:  400 mg on 2/14/2018 10:21 PM                                meloxicam 7.5 MG tablet   Commonly known as:  MOBIC   Take 1 tablet (7.5 mg) by mouth daily   Last time this was given:  7.5 mg on 2/15/2018  8:27 AM                                metoprolol succinate 50 MG 24 hr tablet   Commonly known as:  TOPROL XL   Take 1 tablet by mouth daily.   Last time this was given:  25 mg on 2/15/2018  8:27 AM                                NIACIN (ANTIHYPERLIPIDEMIC) PO   Take 500 mg by mouth At Bedtime                                nitroGLYcerin 0.4 MG sublingual tablet   Commonly known as:  NITROSTAT   Place 1 tablet under the tongue every 5 minutes as needed.                                order for DME   Equipment being ordered: Walker () Treatment Diagnosis: s/p joint replacement                                POTASSIUM CHLORIDE CR PO   Take 10 mEq by mouth daily.                                senna-docusate 8.6-50 MG per tablet   Commonly known as:  SENOKOT-S;PERICOLACE   Take 1 tablet by mouth 2 times daily as needed for constipation   Last time this was given:  2 tablets on 2/13/2018  8:34 PM                                TURMERIC PO                                Vitamin D (Cholecalciferol) 400 UNITS Tabs   Take 1 tablet by mouth daily

## 2018-02-12 NOTE — ANESTHESIA CARE TRANSFER NOTE
Patient: Markie Rico    Procedure(s):  right total hip arthroplasty - Wound Class: I-Clean    Diagnosis: cristian, portable xray  Diagnosis Additional Information: No value filed.    Anesthesia Type:   General, ETT     Note:  Airway :Face Mask  Patient transferred to:PACU  Handoff Report: Identifed the Patient, Identified the Reponsible Provider, Reviewed the pertinent medical history, Discussed the surgical course, Reviewed Intra-OP anesthesia mangement and issues during anesthesia, Set expectations for post-procedure period and Allowed opportunity for questions and acknowledgement of understanding      Vitals: (Last set prior to Anesthesia Care Transfer)    CRNA VITALS  2/12/2018 1227 - 2/12/2018 1304      2/12/2018             SpO2: 96 %                Electronically Signed By: ANY Hood CRNA  February 12, 2018  1:04 PM

## 2018-02-13 ENCOUNTER — APPOINTMENT (OUTPATIENT)
Dept: PHYSICAL THERAPY | Facility: CLINIC | Age: 58
DRG: 470 | End: 2018-02-13
Attending: ORTHOPAEDIC SURGERY
Payer: COMMERCIAL

## 2018-02-13 ENCOUNTER — APPOINTMENT (OUTPATIENT)
Dept: OCCUPATIONAL THERAPY | Facility: CLINIC | Age: 58
DRG: 470 | End: 2018-02-13
Attending: ORTHOPAEDIC SURGERY
Payer: COMMERCIAL

## 2018-02-13 LAB
GLUCOSE SERPL-MCNC: 135 MG/DL (ref 70–99)
HGB BLD-MCNC: 11.1 G/DL (ref 13.3–17.7)

## 2018-02-13 PROCEDURE — 82947 ASSAY GLUCOSE BLOOD QUANT: CPT | Performed by: ORTHOPAEDIC SURGERY

## 2018-02-13 PROCEDURE — 25000128 H RX IP 250 OP 636: Performed by: ORTHOPAEDIC SURGERY

## 2018-02-13 PROCEDURE — 40000274 ZZH STATISTIC RCP CONSULT EA 30 MIN

## 2018-02-13 PROCEDURE — 40000193 ZZH STATISTIC PT WARD VISIT

## 2018-02-13 PROCEDURE — 97535 SELF CARE MNGMENT TRAINING: CPT | Mod: GO

## 2018-02-13 PROCEDURE — 97110 THERAPEUTIC EXERCISES: CPT | Mod: GP

## 2018-02-13 PROCEDURE — 97116 GAIT TRAINING THERAPY: CPT | Mod: GP

## 2018-02-13 PROCEDURE — 40000193 ZZH STATISTIC PT WARD VISIT: Performed by: PHYSICAL THERAPIST

## 2018-02-13 PROCEDURE — 25000132 ZZH RX MED GY IP 250 OP 250 PS 637: Performed by: ORTHOPAEDIC SURGERY

## 2018-02-13 PROCEDURE — 97530 THERAPEUTIC ACTIVITIES: CPT | Mod: GP | Performed by: PHYSICAL THERAPIST

## 2018-02-13 PROCEDURE — 97116 GAIT TRAINING THERAPY: CPT | Mod: GP | Performed by: PHYSICAL THERAPIST

## 2018-02-13 PROCEDURE — 36415 COLL VENOUS BLD VENIPUNCTURE: CPT | Performed by: ORTHOPAEDIC SURGERY

## 2018-02-13 PROCEDURE — 97165 OT EVAL LOW COMPLEX 30 MIN: CPT | Mod: GO

## 2018-02-13 PROCEDURE — 97530 THERAPEUTIC ACTIVITIES: CPT | Mod: GP

## 2018-02-13 PROCEDURE — 40000270 ZZH STATISTIC OXYGEN  O2DAILY TECH TIME

## 2018-02-13 PROCEDURE — 40000275 ZZH STATISTIC RCP TIME EA 10 MIN

## 2018-02-13 PROCEDURE — 12000000 ZZH R&B MED SURG/OB

## 2018-02-13 PROCEDURE — 40000133 ZZH STATISTIC OT WARD VISIT

## 2018-02-13 PROCEDURE — 85018 HEMOGLOBIN: CPT | Performed by: ORTHOPAEDIC SURGERY

## 2018-02-13 RX ADMIN — ACETAMINOPHEN 975 MG: 325 TABLET ORAL at 09:19

## 2018-02-13 RX ADMIN — HYDROMORPHONE HYDROCHLORIDE 4 MG: 2 TABLET ORAL at 20:28

## 2018-02-13 RX ADMIN — HYDROMORPHONE HYDROCHLORIDE 4 MG: 2 TABLET ORAL at 16:47

## 2018-02-13 RX ADMIN — METOPROLOL SUCCINATE 25 MG: 25 TABLET, EXTENDED RELEASE ORAL at 20:29

## 2018-02-13 RX ADMIN — MELOXICAM 7.5 MG: 7.5 TABLET ORAL at 09:19

## 2018-02-13 RX ADMIN — CYCLOBENZAPRINE HYDROCHLORIDE 10 MG: 10 TABLET, FILM COATED ORAL at 14:15

## 2018-02-13 RX ADMIN — HYDROMORPHONE HYDROCHLORIDE 2 MG: 2 TABLET ORAL at 02:23

## 2018-02-13 RX ADMIN — GABAPENTIN 300 MG: 300 CAPSULE ORAL at 09:22

## 2018-02-13 RX ADMIN — HYDROMORPHONE HYDROCHLORIDE 4 MG: 2 TABLET ORAL at 23:46

## 2018-02-13 RX ADMIN — HYDROMORPHONE HYDROCHLORIDE 2 MG: 2 TABLET ORAL at 09:22

## 2018-02-13 RX ADMIN — ASPIRIN 325 MG: 325 TABLET, FILM COATED ORAL at 20:29

## 2018-02-13 RX ADMIN — MAGNESIUM OXIDE TAB 400 MG (241.3 MG ELEMENTAL MG) 400 MG: 400 (241.3 MG) TAB at 20:29

## 2018-02-13 RX ADMIN — HYDROMORPHONE HYDROCHLORIDE 4 MG: 2 TABLET ORAL at 12:01

## 2018-02-13 RX ADMIN — HYDROMORPHONE HYDROCHLORIDE 2 MG: 2 TABLET ORAL at 05:59

## 2018-02-13 RX ADMIN — CYCLOBENZAPRINE HYDROCHLORIDE 10 MG: 10 TABLET, FILM COATED ORAL at 21:52

## 2018-02-13 RX ADMIN — ASPIRIN 325 MG: 325 TABLET, FILM COATED ORAL at 09:21

## 2018-02-13 RX ADMIN — ACETAMINOPHEN 975 MG: 325 TABLET ORAL at 16:47

## 2018-02-13 RX ADMIN — LISINOPRIL 10 MG: 10 TABLET ORAL at 09:20

## 2018-02-13 RX ADMIN — ACETAMINOPHEN 975 MG: 325 TABLET ORAL at 00:39

## 2018-02-13 RX ADMIN — METOPROLOL SUCCINATE 25 MG: 25 TABLET, EXTENDED RELEASE ORAL at 09:21

## 2018-02-13 RX ADMIN — SENNOSIDES AND DOCUSATE SODIUM 2 TABLET: 8.6; 5 TABLET ORAL at 20:34

## 2018-02-13 RX ADMIN — CEFAZOLIN SODIUM 2 G: 2 INJECTION, SOLUTION INTRAVENOUS at 02:23

## 2018-02-13 NOTE — OP NOTE
Procedure Date: 02/12/2018      DATE OF SURGERY:   02/12/2018.      POSTOPERATIVE DIAGNOSIS:  Degenerative arthritis, right hip.      SECONDARY DIAGNOSES:  Morbid obesity.      PROCEDURE:  Right total hip arthroplasty through a posterior approach utilizing Manuel 60 mm in diameter Tritanium cluster cup with a single fixation screw, 6 Accolade 2 femoral stem press-fit, +0 Biolox femoral head, 36 mm in diameter  X3 polyethylene liner.      SURGEON:  Vineet Bedolla MD.      ANESTHESIA:  General with injection of pain cocktail.      ESTIMATED BLOOD LOSS:  500 mL.      INDICATIONS:  Markie is a 57-year-old gentleman with severe degenerative arthritis of both his hips.  His ambulation was limited to the use of a cane.  His overall health activities have diminished substantially over the last year or so.      He has committed to weight loss and has proven weight loss in our office examinations.      Despite the fact that still considerable weight to lose, his dysfunction was felt to be a great hindrance for returning to activities, and therefore total joint was offered.  Rationale, risks and benefits were understood.  He is willing to proceed.  Consent was obtained in the preoperative hold area with his wife present.  The extremity was marked.      DETAILS OF PROCEDURE:  The patient was brought to the operating room, placed supine on the table, and general anesthesia induced.  Caro catheter was placed.      He was then rolled to the left lateral decubitus position.  Axillary roll was placed.  Bony prominences were padded. He was stabilized using a pegboard.      The right hip was then prepped in the standard fashion for total joint replacement using ChloraPrep.  All operating personnel utilized exhaust systems.  Posterior approach incision was now planned and  created.  Sharp dissection was carried through skin and subcutaneous tissues in a careful fashion until  we came upon the IT band and gluteus merary fascia.   Full-thickness soft tissue mobilization was accomplished so we could identify appropriate anatomic landmarks.        A longitudinal incision was made in the IT band and carried proximally until the tip of the greater trochanter and then angled posteriorly following the fibers of the gluteus merary.  Sharp dissection was carried through the fascia only, and blunt dissection was used to create this layer.  Charnley retractor was placed.      The leg lacked internal rotation, so the deep structure dissection was difficult.  We had to mobilize the fat overlying the short external rotators.  The posterior edge of the abductors were retracted superiorly with a Dqauan retractor.  This allowed us to identify the piriformis which was isolated and then released.  It was tagged.  We peeled the muscles off the superior capsule and placed a cobra retractor superiorly.  Then, we made a longitudinal incision through the capsule at the bed of the piriformis and then peeled the capsule off the posterior aspect of the greater trochanter.  This included the external rotators.  This whole area was tagged.  We were very careful to try and cauterize aggressively any vessels that were encountered.  Superior capsule was then incised and released.      There was significant deformity of the femoral head.  Internal rotation was accomplished carefully until the head was dislocated.      Cobra retractors were placed superiorly and inferiorly along the femoral neck region.  Using a cutting guide,  we marked the femoral neck and then created a femoral neck cut at an area above the lesser trochanter.  Femoral head was removed.  Difficult to measure the femoral head due to the lack of severe spherical design.      We had to work very hard to retract the proximal femur with a bone hook and perform an anterior capsular release where we could place anterior acetabular retractors.  We then released the inferior capsule and T-d it to mobilize the femur  further.      We then worked to remove the lip of the acetabulum and release the posterior capsule.  Anterior and posterior retractors were positioned so visualization could be accomplished.  Again, this was difficult due to the size of the patient and the tightness that had occurred from his deformed hip.      We now introduced a 50 mm acetabular reamer, given the size of this gentleman's the acetabulum.  We worked very hard to ream medially to the base of the cotyledon notch.  This became more evident as reaming was begun.  This helped us with reference of the teardrop and the actual depth of the appropriate acetabulum.  Once we had come to the recognition of this notch,  we then began systematically reaming.  This was carried up to a size 59, which seemed to fill the acetabulum appropriately and give us good orientation.  Redundant osteophytes anteriorly and posteriorly were encountered.      We placed a 59 mm cage trial and assessed the orientation with respect to the patient's anatomy.  We felt that this was appropriate and so we drove home the 60 mm acetabular shell which actually gave excellent purchase.  We again used the alignment tool to guide us as well as the patient's own anatomy.  A trial liner was placed at this point.      The proximal femur was then presented.  With appropriate retraction, we used a box chisel and T-handle reamer to enter the canal.  We then systematically broached up to a size 5, which we thought was an appropriate size.  We then performed the reduction using a standard head/neck configuration.  Reduction was accomplished with what was felt to be good stability.  An X-ray was obtained.  We recognized that the cup was in good position and that we could probably increase the size of the femoral stem.  We also recognized the length appeared to be good.      The hip was re-dislocated.  The femoral component was then driven down further and then carried up to a size 6 stem, which  actually filled the canal very nicely, was solid.  Stem seated at the same level of the femoral neck.  At this point, we simply remove the femoral stem.      Acetabular retractors were placed.  This allowed good visualization of the acetabulum.  The trial liner was removed.  We placed a single fixation screw which had excellent purchase.  We then drilled home  the definitive liner.      We infiltrated the capsular region with a pain cocktail at this time.  The area was then irrigated with pulsatile lavage.      The proximal femur was then presented.  The size 6 stem was driven home.  It seated itself very appropriately.  The trial reduction was again accomplished, and felt to be appropriate.      The hip was re-dislocated, and the definitive Biolox head was placed.  We then reduced the hip and tested stability.  The hip could be extended past midline.  We could not dislocate it anteriorly.  Forced flexion to the maximum on the table was accomplished without subluxation or dislocation.  At 90 degrees of flexion, full adduction across midline and neutral position was possible without subluxation.  The hip was held at 90 degrees of flexed adducted internal rotation to 50 degrees was necessary to dislocate.  I thought these were very good stability parameters.      With the hip in place, we irrigated again.  We repaired the short external rotators through drill holes using #5 Ethibond.  The area including the IT band and gluteus max was infiltrated with the remainder of the pain cocktail.      The IT band fascia well closed with figure-of-eight #5 Ethibond.  Subcutaneous tissues were approximated using 0 and 2-0 Vicryl, and the skin was closed with staples.  Dressings of Xeroform, 4x4, ABD was then applied and held in position with tape.  Sequential MANDY stocking was applied.  SCD was applied.  The patient was rolled supine and then transferred to the [facility].  Abduction pillow was then placed.  He was then taken  to recovery area, where stable vital signs were noted.         DANIELLA CHAMBERS MD             D: 2018   T: 2018   MT: TJ      Name:     JESSICA PHILLIPS   MRN:      [id number]        Account:        [id number]   :      1960           Procedure Date: 2018      Document: [id number]

## 2018-02-13 NOTE — PLAN OF CARE
Problem: Patient Care Overview  Goal: Plan of Care/Patient Progress Review  Outcome: No Change  VSS. Afebrile. C/o some pain/discomfort to R hip. PRN pain medication given per MAR which has been effective. Denies any numbness/tingling. Ice packs applied to R hip. LS clear throughout. Caro draining adequate amounts of clear yellow urine. No complaints.

## 2018-02-13 NOTE — PROGRESS NOTES
[facility] Orthopedic Post-Op Note         Assessment and Plan:    Assessment:   Post-operative day #1  Total hip arthoplasty (Right)  Procedure(s):  ARTHROPLASTY HIP  Doing well.  No immediate surgical complications identified.  No excessive bleeding  Pain well-controlled.  Tolerating physical therapy and rehabilitation well.  Having difficulty with independent transfers.  This is most likely related to his size       Plan:   Continue occupational therapy, physical therapy and supportive and symptomatic treatment           Interval History:   Doing well.  Continues to improve.  Pain is well-controlled.  No fevers.              Physical Exam:   All vitals have been reviewed  Patient Vitals for the past 8 hrs:   BP Temp Temp src Pulse Heart Rate Resp SpO2   02/13/18 1143 108/55 98.2  F (36.8  C) Oral 67 64 18 100 %   02/13/18 0730 119/51 98.1  F (36.7  C) Oral 64 - 16 98 %     I/O last 3 completed shifts:  In: 5630 [P.O.:1720; I.V.:3910]  Out: 3920 [Urine:3420; Blood:500]    Dressing dry and intact.           Data:   All laboratory/imaging data related to this surgery reviewed  Results for orders placed or performed during the hospital encounter of 02/12/18 (from the past 24 hour(s))   XR Pelvis w Hip Port Right 1 View    Narrative    PELVIS AD HIP PORTABLE RIGHT ONE VIEW February 12, 2018 1:37 PM     HISTORY: Postoperative.    COMPARISON: Intraoperative x-rays dated 2/12/2018, preoperative pelvis  x-rays dated 11/30/2017.      Impression    IMPRESSION:   1. There is a right total hip arthroplasty without evidence for  hardware failure or periprosthetic fracture. The femoral component is  now a permanent component. Gas is seen within the soft tissues. There  is some stranding in subcutaneous adipose tissues consistent with  inflammation from recent surgery. There are lateral skin staples.  2. Severe degenerative changes of left hip are again noted. These are  similar to the prior study from  11/30/2017.    TONY SALVADOR MD   Hospitalist IP Consult: Patient to be seen: Routine - within 24 hours; Hospitalist Consult post op hip surgery with multiple medical issues.Including HTN and obesity.; Consultant may enter orders: Yes    Narrative    Arsalan Ferguson MD     2/12/2018 10:14 PM  [facility]    Hospitalist Consultation    Date of Admission:  2/12/2018    Assessment & Plan   Markie Rico is a 57 year old male who was admitted on 2/12/2018.   I was asked to see the patient for evaluation and management for   his chronic medical conditions after an elective right total hip   replacement that was performed today by Dr Bedolla.  Patient has   had  worsening pain in his right hip for some time necessitating   surgery today.. Patient is managed by the [facility] with   a history of coronary artery disease status post stents in 2000 1/2010.  He has had no symptoms related to this.  He is   controlled diabetes taking lisinopril and Toprol and   hyperlipidemia on Lipitor.  He is morbidly obese with plan to   lose weight with diet in the future.  Surgery went well today, he   has had his dose of lisinopril and Toprol since surgery and blood   pressure is tolerated this.  He is eating well with good pain   control with the current regimen.  He has been seen by physical   therapy and is already been up walking.  He is planning to go   home when able.  At this point will continue his current   medications and dosing of lisinopril, Toprol and lipitor.  Since   he is medically quite stable, will follow along peripherally, but   would be happy to come back for further evaluation if any   issues..    Active Problems:    S/P total hip arthroplasty    CAD (coronary artery disease)    Benign essential hypertension    Hyperlipidemia LDL goal <100    Morbid obesity (H)      DVT Prophylaxis: Defer to primary service  Code Status: Full Code    Disposition: Expected discharge in 1-2  days once cleared by   surgery.    Arsalan Ferguson MD    Reason for Consult   Reason for consult: I was asked by Dr. Bedolla to evaluate this   patient for chronic medical conditions after his hip replacement   surgery.    Primary Care Physician   Alfred Seth Abernathy    Chief Complaint   57-year-old male who had elective right total hip replacement   today    History is obtained from the patient and electronic health record    History of Present Illness   Markie Rico is a 57 year old male who presents with right hip   pain status post hip replacement surgery today.  He has had   worsening pain in that hip for some time, and had elective   surgery today with Dr. Bedolla.  Previous history significant for   coronary artery disease, status post stent placement in 2001 in   2010.  He is done well, followed by the [facility] with   regular testing and has had no recurrent chest pain.  He is   taking lisinopril and Toprol for high blood pressure and takes   Lipitor for cholesterol management.  Prior to surgery he felt   well, he admits that he is overweight with plans to lose weight   with exercise after the surgery.    Past Medical History    I have reviewed this patient's medical history and updated it   with pertinent information if needed.   Past Medical History:   Diagnosis Date     Diverticulitis      Old myocardial infarction     s/p successful emergent revascualrization, PTCR/stent at the [facility]   for acute MI     Old myocardial infarction 06/07/11    Non-Q-wave MI-[place]     Other and unspecified hyperlipidemia      Tobacco use disorder        Past Surgical History   I have reviewed this patient's surgical history and updated it   with pertinent information if needed.  Past Surgical History:   Procedure Laterality Date     C ANESTH,DX ARTHROSCOPIC PROC KNEE JOINT       C COLOSTOMY       C UNLISTED LAPAROSCOPY PROC,INTESTINE  7/25/2003    Exploratory Lap. Lysis of adhesions. Sigmoid colostomy takedown   and  stapled coloproctostomy.     HC PTCA SINGLE VESSEL      2 stents       Prior to Admission Medications   Prior to Admission Medications   Prescriptions Last Dose Informant Patient Reported? Taking?   Capsicum, Cayenne, (CAYENNE PO) Past Week at 02/10/18  Yes Yes   Cinnamon Bark POWD Past Week at 02/10/18  Yes Yes   MAGNESIUM OXIDE PO 2/11/2018 at 2100  Yes Yes   Sig: Take 400 mg by mouth daily    Multiple Vitamins-Minerals (CENTRUM MEN PO)   Yes Yes   Sig: Take 1 tablet by mouth daily   NIACIN, ANTIHYPERLIPIDEMIC, PO 2/11/2018 at 2100  Yes Yes   Sig: Take 500 mg by mouth At Bedtime    Naproxen Sodium (ALEVE PO) 2/12/2018 at 0600  Yes Yes   Sig: Take 220 mg by mouth 3 times daily (with meals)   POTASSIUM CHLORIDE CR PO 2/11/2018 at 2100  Yes Yes   Sig: Take 10 mEq by mouth daily.   TRAMADOL HCL PO 2/12/2018 at 0600  Yes Yes   Sig: Take 100 mg by mouth 3 times daily as needed for moderate to   severe pain   TURMERIC PO 02/10/18 at 21  Yes Yes   Vitamin D, Cholecalciferol, 400 UNITS TABS   Yes Yes   Sig: Take 1 tablet by mouth daily   atorvastatin (LIPITOR) 80 MG tablet More than a month at Unknown   time  Yes No   Sig: Take 80 mg by mouth daily   gabapentin (NEURONTIN) 300 MG capsule 2/11/2018 at 2100  No Yes   Sig: Take 1 capsule (300 mg) by mouth daily   glucosamine-chondroitin 500-400 MG CAPS per capsule More than a   month at Unknown time  Yes No   Sig: Take 1 capsule by mouth daily   lisinopril (PRINIVIL,ZESTRIL) 10 MG tablet 2/11/2018 at 2100  No   Yes   Sig: Take 1 tablet by mouth daily. Take 10 mg by mouth daily.   meloxicam (MOBIC) 7.5 MG tablet 2/11/2018 at 2100  No Yes   Sig: Take 1 tablet (7.5 mg) by mouth daily   metoprolol (TOPROL XL) 50 MG 24 hr tablet 2/12/2018 at 0600  No   Yes   Sig: Take 1 tablet by mouth daily.   Patient taking differently: Take 25 mg by mouth 2 times daily    nitroglycerin (NITROSTAT) 0.4 MG SL tablet More than a month at   Unknown time  No No   Sig: Place 1 tablet under the tongue  every 5 minutes as needed.      Facility-Administered Medications: None     Allergies   Allergies   Allergen Reactions     No Known Allergies        Social History   I have reviewed this patient's social history and updated it with   pertinent information if needed. Markie Rico  reports that he   quit smoking about 18 years ago. His smokeless tobacco use   includes Chew. He reports that he drinks alcohol. He reports that   he does not use illicit drugs.    Family History   I have reviewed this patient's family history and updated it with   pertinent information if needed.   Family History   Problem Relation Age of Onset     Lipids Mother      Psychotic Disorder Mother      anxiety     Hypertension Mother      Prostate Cancer Father       at 74     DIABETES Sister        Review of Systems   The 10 point Review of Systems is negative other than noted in   the HPI or here.     Physical Exam   Temp: 99.8  F (37.7  C) Temp src: Oral BP: 120/53 Pulse: 86 Heart   Rate: 86 Resp: 18 SpO2: 95 % O2 Device: Nasal cannula Oxygen   Delivery: 2 LPM  Vital Signs with Ranges  Temp:  [96.9  F (36.1  C)-99.8  F (37.7  C)] 99.8  F (37.7  C)  Pulse:  [68-86] 86  Heart Rate:  [63-86] 86  Resp:  [12-20] 18  BP: (114-189)/(52-97) 120/53  SpO2:  [95 %-100 %] 95 %  0 lbs 0 oz    Constitutional: Overweight male lying in bed in no obvious   distress.  Eyes: Eyes are clear normal EOM  HEENT: Nose mouth throat appear normal  Respiratory: Lungs are clear  Cardiovascular: Regular rate rhythm no murmur heard  GI: Belly is soft nontender  Lymph/Hematologic: No cervical lymph node enlargement  Genitourinary: Not examined  Skin: No obvious rash  Musculoskeletal: Dressing on the right hip after surgery, no sign   of shadowing  Neurologic: Not formally tested, did not attempt to walk  Psychiatric: Alert, oriented with normal mood and affect    Data   -Data reviewed today: All pertinent laboratory and imaging   results from this encounter were  reviewed. I personally reviewed   no images or EKG's today.  No lab results found in last 7 days.    Recent Results (from the past 24 hour(s))   XR Pelvis Port 1/2 Views    Narrative    PELVIS PORTABLE ONE TO TWO VIEWS  2/12/2018 1:00 PM     HISTORY:  Right total hip. Intraoperative pelvis. Osteoarthritis.    COMPARISON: Pelvis and hip x-rays dated 11/30/2017.      Impression    IMPRESSION:   Single intraoperative image demonstrates right  acetabular and temporary right femoral prostheses without   evidence for  hardware failure or periprosthetic fracture. Severe degenerative  changes of the left hip are again noted.    TONY SALVADOR MD   XR Pelvis w Hip Port Right 1 View    Narrative    PELVIS AD HIP PORTABLE RIGHT ONE VIEW February 12, 2018 1:37 PM     HISTORY: Postoperative.    COMPARISON: Intraoperative x-rays dated 2/12/2018, preoperative   pelvis  x-rays dated 11/30/2017.      Impression    IMPRESSION:   1. There is a right total hip arthroplasty without evidence for  hardware failure or periprosthetic fracture. The femoral   component is  now a permanent component. Gas is seen within the soft tissues.   There  is some stranding in subcutaneous adipose tissues consistent with  inflammation from recent surgery. There are lateral skin staples.  2. Severe degenerative changes of left hip are again noted. These   are  similar to the prior study from 11/30/2017.    TONY SALVADOR MD              Hemoglobin   Result Value Ref Range    Hemoglobin 11.1 (L) 13.3 - 17.7 g/dL   Glucose   Result Value Ref Range    Glucose 135 (H) 70 - 99 mg/dL        Vineet Bedolla MD

## 2018-02-13 NOTE — ANESTHESIA POSTPROCEDURE EVALUATION
Patient: Markie Rico    Procedure(s):  right total hip arthroplasty - Wound Class: I-Clean    Diagnosis:cristian, portable xray  Diagnosis Additional Information: No value filed.    Anesthesia Type:  General, ETT    Note:  Anesthesia Post Evaluation    Patient location during evaluation: Floor  Patient participation: Able to fully participate in evaluation  Level of consciousness: awake and alert  Pain management: adequate  Airway patency: patent  Cardiovascular status: acceptable  Respiratory status: acceptable  Hydration status: acceptable  PONV: none     Anesthetic complications: None          Last vitals:  Vitals:    02/13/18 0351 02/13/18 0730 02/13/18 1143   BP: 119/64 119/51 108/55   Pulse: 59 64 67   Resp: 16 16 18   Temp: 96.5  F (35.8  C) 98.1  F (36.7  C) 98.2  F (36.8  C)   SpO2: 96% 98% 100%         Electronically Signed By: ANY Barbosa CRNA  February 13, 2018  1:22 PM

## 2018-02-13 NOTE — CONSULTS
CARE TRANSITION SOCIAL WORK INITIAL ASSESSMENT:  Reason For Consult: discharge planning   Met with: Patient.    DATA  Active Problems:    Hyperlipidemia LDL goal <100    Morbid obesity (H)    CAD (coronary artery disease)    Benign essential hypertension    S/P total hip arthroplasty       Primary Care Clinic Name: [facility]  Primary Care MD Name: Elias Gonzalez  Contact information and PCP information verified: Yes      ASSESSMENT  Cognitive Status: awake, alert and oriented.             Lives With: spouse, sibling(s)  Living Arrangements: house  Quality Of Family Relationships: supportive, involved, helpful  Description of Support System: Supportive, Involved   Who is your support system?: Wife, Sibling(s)   Support Assessment: Adequate family and caregiver support   Insurance Concerns: No Insurance issues identified        This writer met with pt and introduced self and role. Discussed discharge planning and guidelines in regards to home care with [facility] benefits. Patients wife and brother will be home with patient during recovery. Patient will be going home with home PT.       PLAN    Patient want [facility] home care. Awaiting clearance from the [facility]    Discharge Planner   Discharge Plans in progress: yes   Barriers to discharge plan: none   Follow up plan: go to any follow up appointments        Entered by: Shruthi Dickinson 02/13/2018 11:04 AM

## 2018-02-13 NOTE — PLAN OF CARE
Problem: Patient Care Overview  Goal: Plan of Care/Patient Progress Review  Outcome: Therapy, progress toward functional goals as expected  S-(situation): shift note    B-(background): RTHA    A-(assessment): Pt is A&O.  VSS.  Afebrile.  Having left hip pain.  Dilaudid and flexaril have been effective for this.  CMS intact.  LS clear, uses the incentive spirometer on his own.  Decided to stay as needs to work on getting in and out of bed.  Up and walking x2.  Tolerated this well.    R-(recommendations): Will cont to monitor the above.  Possible discharge tomorrow.

## 2018-02-13 NOTE — PROGRESS NOTES
02/12/18 1659   Quick Adds   Type of Visit Initial PT Evaluation   Living Environment   Lives With spouse   Living Arrangements house   Home Accessibility bed and bath on same level;stairs to enter home;stairs within home   Number of Stairs to Enter Home 5  (no HR)   Number of Stairs Within Home 1   Transportation Available car;family or friend will provide   Living Environment Comment Will have family support when home   Self-Care   Usual Activity Tolerance poor   Current Activity Tolerance poor   Regular Exercise no   Equipment Currently Used at Home cane, straight  (2 canes in each hand.)   Activity/Exercise/Self-Care Comment Reacher. Will have FWW at time of d/c   Functional Level Prior   Ambulation 1-->assistive equipment   Transferring 1-->assistive equipment   Toileting 1-->assistive equipment   Bathing 2-->assistive person  (showers, wife helps with back)   Dressing 1-->assistive equipment  (Grabber for socks)   Eating 0-->independent   Communication 0-->understands/communicates without difficulty   Swallowing 0-->swallows foods/liquids without difficulty   Cognition 0 - no cognition issues reported   Fall history within last six months yes   Number of times patient has fallen within last six months 3  (Notes he tripped over his canes at home, just bruises)   Which of the above functional risks had a recent onset or change? ambulation;transferring;toileting;bathing;dressing   Prior Functional Level Comment Increased difficulty with mobility. Utilized 2 SPC for ambulation, transfes, and toileting. Pt noted to have difficulty sitting and difficulty with sleeping in his bed. Has been sleeping in his recliner.   General Information   Onset of Illness/Injury or Date of Surgery - Date 02/12/18   Referring Physician Dr. Bedolla   Patient/Family Goals Statement To go home with family support   Pertinent History of Current Problem (include personal factors and/or comorbidities that impact the POC) Pt has increased  pain in B hips. PMH of colon rupture, MIs. Noted to have significant L sided hip pain. he has not slept in his bed for about a year due to increased hip pain. Pt ambulates with 2 canes. He notes that he would only walk what is necessry and no more due to pain.   Precautions/Limitations right hip precautions   Weight-Bearing Status - RLE partial weight-bearing (% in comments)  (80% or weight bearing as tolerated with walker.)   Heart Disease Risk Factors Overweight;High blood pressure;Lack of physical activity;Dislipidemia;Medical history;Gender;Age   Cognitive Status Examination   Orientation orientation to person, place and time   Level of Consciousness alert   Follows Commands and Answers Questions 100% of the time   Personal Safety and Judgment intact   Memory intact   Pain Assessment   Patient Currently in Pain Yes, see Vital Sign flowsheet  (1/10)   Integumentary/Edema   Integumentary/Edema Comments Not assessed directely due to surgical dressings in place. No other deficits noted at this time   Posture    Posture Comments Rounded shoudlers, forward head   Range of Motion (ROM)   ROM Comment Limited RLE due to MARIMAR. WFL BUE. limited ROM on LLE due to hip pain   Strength   Strength Comments Limited strength BLE (R>L secondary to MARIMAR) BUE WFL    Bed Mobility   Bed Mobility Bed mobility skill: Scooting/Bridging;Bed mobility skill: Sit to supine;Bed mobility skill: Supine to sit   Bed Mobility Skill: Scooting/Bridging   Level of Steele: Scooting/Bridging stand-by assist   Physical/Nonphysical Assist: Scooting/Bridging verbal cues   Assistive Device: Scooting/Bridging bed rails   Bed Mobility Skill: Sit to Supine   Level of Steele: Sit/Supine contact guard   Physical Assist/Nonphysical Assist: Sit/Supine verbal cues  (to maintain hip precautions)   Assistive Device: Sit/Supine bed rails   Bed Mobility Skill: Supine to Sit   Level of Steele: Supine/Sit minimum assist (75% patients effort)  (for LLE  mobility, HHA to sit)   Physical Assist/Nonphysical Assist: Supine/Sit verbal cues;1 person assist   Assistive Device: Supine/Sit bed rails   Transfer Skills   Transfer Transfer Skill: Sit to Stand;Transfer Safety Analysis Sit/Stand;Transfer Skill:  Stand to Sit;Transfer Safety Analysis Stand/Sit   Transfer Skill:  Sit to Stand   Level of [place]: Sit/Stand minimum assist (75% patients effort)   Physical Assist/Nonphysical Assist: Sit/Stand verbal cues;1 person assist   Weightbearing Restrictions: Sit/Stand partial weight-bearing  (80% or WBAT with walker)   Assistive Device for Transfer: Sit/Stand rolling walker   Transfer Safety Analysis Sit to Stand   Transfer Safety Concerns Noted: Sit/Stand decreased weight-shifting ability   Impairments Contributing to Impaired Transfers: Sit to Stand pain;decreased strength;impaired balance   Transfer Skill: Stand to Sit   Level of [place]: Stand/Sit contact guard   Physical Assist/Nonphysical Assist: Stand/Sit verbal cues  (safe hand placement, maintaining hip precautions)   Weight-Bearing Restrictions: Stand/Sit partial weight-bearing  (80% or WBAT with walker)   Assistive Device: Stand to Sit rolling walker   Transfer Safety Analysis Stand To Sit   Transfer Safety Concerns Noted: Stand to Sit decreased weight-shifting ability   Impairments Contributing to Impaired Transfers: Stand to Sit pain;decreased strength   Gait   Gait Comments Pt ambulated about 15 feet within hospital room with CGA and FWW. Pt educated on weight bearing status. good step through pattern.    Balance   Balance Comments Impaired balance as expected following MARIMAR   Sensory Examination   Sensory Perception no deficits were identified   Coordination   Coordination no deficits were identified   Muscle Tone   Muscle Tone no deficits were identified   Modality Interventions   Planned Modality Interventions Cryotherapy   Planned Modality Interventions Comments as needed   General Therapy  "Interventions   Planned Therapy Interventions balance training;bed mobility training;gait training;joint mobilization;manual therapy;motor coordination training;neuromuscular re-education;ROM;stretching;strengthening;risk factor education;home program guidelines;progressive activity/exercise;transfer training   Intervention Comments as needed   Clinical Impression   Criteria for Skilled Therapeutic Intervention yes, treatment indicated   PT Diagnosis Hip pain and dysfunction s/p MARIMAR   Influenced by the following impairments decreased strength, decresaed ROM, increased pain, decreased balance   Functional limitations due to impairments Diffuculty with bed mobility at home, difficulty with ambulation distances for grocery store, difficulty with negotiation of steps into home   Clinical Presentation Evolving/Changing   Clinical Presentation Rationale Pt has PMH of cardiac events and is noted to have significant pain in his L hip as well. These factors could affect his progress with his rehab   Clinical Decision Making (Complexity) Moderate complexity   Therapy Frequency` 2 times/day   Predicted Duration of Therapy Intervention (days/wks) 2-3 days   Anticipated Equipment Needs at Discharge (Possible need of FWW)   Anticipated Discharge Disposition Home with Home Therapy   Risk & Benefits of therapy have been explained Yes   Patient, Family & other staff in agreement with plan of care Yes   Clinical Impression Comments Pt will benefit from skilled PT to progress strength and ROM for improved independence and safety with functional mobility   [facility] [place]-PAC TM \"6 Clicks\"   2016, Trustees of [facility], under license to [employer].  All rights reserved.   6 Clicks Short Forms Basic Mobility Inpatient Short Form   [facility] AM-PAC  \"6 Clicks\" V.2 Basic Mobility Inpatient Short Form   1. Turning from your back to your side while in a flat bed without using bedrails? 3 - A Little   2. Moving from " lying on your back to sitting on the side of a flat bed without using bedrails? 3 - A Little   3. Moving to and from a bed to a chair (including a wheelchair)? 3 - A Little   4. Standing up from a chair using your arms (e.g., wheelchair, or bedside chair)? 3 - A Little   5. To walk in hospital room? 3 - A Little   6. Climbing 3-5 steps with a railing? 2 - A Lot   Basic Mobility Raw Score (Score out of 24.Lower scores equate to lower levels of function) 17   Total Evaluation Time   Total Evaluation Time (Minutes) 35     Thank you for your referral,     Tania Plata, PT, DPT  [phone]  [facility]

## 2018-02-13 NOTE — CONSULTS
[facility]    Hospitalist Consultation    Date of Admission:  2/12/2018    Assessment & Plan   Markie Rico is a 57 year old male who was admitted on 2/12/2018. I was asked to see the patient for evaluation and management for his chronic medical conditions after an elective right total hip replacement that was performed today by Dr Bedolla.  Patient has had  worsening pain in his right hip for some time necessitating surgery today.. Patient is [facility] with a history of coronary artery disease status post stents in 2000 1/2010.  He has had no symptoms related to this.  He is controlled diabetes taking lisinopril and Toprol and hyperlipidemia on Lipitor.  He is morbidly obese with plan to lose weight with diet in the future.  Surgery went well today, he has had his dose of lisinopril and Toprol since surgery and blood pressure is tolerated this.  He is eating well with good pain control with the current regimen.  He has been seen by physical therapy and is already been up walking.  He is planning to go home when able.  At this point will continue his current medications and dosing of lisinopril, Toprol and lipitor.  Since he is medically quite stable, will follow along peripherally, but would be happy to come back for further evaluation if any issues..    Active Problems:    S/P total hip arthroplasty    CAD (coronary artery disease)    Benign essential hypertension    Hyperlipidemia LDL goal <100    Morbid obesity (H)      DVT Prophylaxis: Defer to primary service  Code Status: Full Code    Disposition: Expected discharge in 1-2 days once cleared by surgery.    Arsalan Ferguson MD    Reason for Consult   Reason for consult: I was asked by Dr. Bedolla to evaluate this patient for chronic medical conditions after his hip replacement surgery.    Primary Care Physician   Alfred Seth Abernathy    Chief Complaint   57-year-old male who had elective right total hip  replacement today    History is obtained from the patient and electronic health record    History of Present Illness   Markie Rico is a 57 year old male who presents with right hip pain status post hip replacement surgery today.  He has had worsening pain in that hip for some time, and had elective surgery today with Dr. Bedolla.  Previous history significant for coronary artery disease, status post stent placement in 2001 in 2010.  He is done well, followed by the [facility] with regular testing and has had no recurrent chest pain.  He is taking lisinopril and Toprol for high blood pressure and takes Lipitor for cholesterol management.  Prior to surgery he felt well, he admits that he is overweight with plans to lose weight with exercise after the surgery.    Past Medical History    I have reviewed this patient's medical history and updated it with pertinent information if needed.   Past Medical History:   Diagnosis Date     Diverticulitis      Old myocardial infarction     s/p successful emergent revascualrization, PTCR/stent at the LAD for acute MI     Old myocardial infarction 06/07/11    Non-Q-wave MI-[place]     Other and unspecified hyperlipidemia      Tobacco use disorder        Past Surgical History   I have reviewed this patient's surgical history and updated it with pertinent information if needed.  Past Surgical History:   Procedure Laterality Date     C ANESTH,DX ARTHROSCOPIC PROC KNEE JOINT       C COLOSTOMY       C UNLISTED LAPAROSCOPY PROC,INTESTINE  7/25/2003    Exploratory Lap. Lysis of adhesions. Sigmoid colostomy takedown and stapled coloproctostomy.     HC PTCA SINGLE VESSEL      2 stents       Prior to Admission Medications   Prior to Admission Medications   Prescriptions Last Dose Informant Patient Reported? Taking?   Capsicum, Cayenne, (CAYENNE PO) Past Week at 02/10/18  Yes Yes   Cinnamon Bark POWD Past Week at 02/10/18  Yes Yes   MAGNESIUM OXIDE PO 2/11/2018 at 2100  Yes Yes   Sig:  Take 400 mg by mouth daily    Multiple Vitamins-Minerals (CENTRUM MEN PO)   Yes Yes   Sig: Take 1 tablet by mouth daily   NIACIN, ANTIHYPERLIPIDEMIC, PO 2/11/2018 at 2100  Yes Yes   Sig: Take 500 mg by mouth At Bedtime    Naproxen Sodium (ALEVE PO) 2/12/2018 at 0600  Yes Yes   Sig: Take 220 mg by mouth 3 times daily (with meals)   POTASSIUM CHLORIDE CR PO 2/11/2018 at 2100  Yes Yes   Sig: Take 10 mEq by mouth daily.   TRAMADOL HCL PO 2/12/2018 at 0600  Yes Yes   Sig: Take 100 mg by mouth 3 times daily as needed for moderate to severe pain   TURMERIC PO 02/10/18 at 21  Yes Yes   Vitamin D, Cholecalciferol, 400 UNITS TABS   Yes Yes   Sig: Take 1 tablet by mouth daily   atorvastatin (LIPITOR) 80 MG tablet More than a month at Unknown time  Yes No   Sig: Take 80 mg by mouth daily   gabapentin (NEURONTIN) 300 MG capsule 2/11/2018 at 2100  No Yes   Sig: Take 1 capsule (300 mg) by mouth daily   glucosamine-chondroitin 500-400 MG CAPS per capsule More than a month at Unknown time  Yes No   Sig: Take 1 capsule by mouth daily   lisinopril (PRINIVIL,ZESTRIL) 10 MG tablet 2/11/2018 at 2100  No Yes   Sig: Take 1 tablet by mouth daily. Take 10 mg by mouth daily.   meloxicam (MOBIC) 7.5 MG tablet 2/11/2018 at 2100  No Yes   Sig: Take 1 tablet (7.5 mg) by mouth daily   metoprolol (TOPROL XL) 50 MG 24 hr tablet 2/12/2018 at 0600  No Yes   Sig: Take 1 tablet by mouth daily.   Patient taking differently: Take 25 mg by mouth 2 times daily    nitroglycerin (NITROSTAT) 0.4 MG SL tablet More than a month at Unknown time  No No   Sig: Place 1 tablet under the tongue every 5 minutes as needed.      Facility-Administered Medications: None     Allergies   Allergies   Allergen Reactions     No Known Allergies        Social History   I have reviewed this patient's social history and updated it with pertinent information if needed. Markie Rico  reports that he quit smoking about 18 years ago. His smokeless tobacco use includes Chew. He reports  that he drinks alcohol. He reports that he does not use illicit drugs.    Family History   I have reviewed this patient's family history and updated it with pertinent information if needed.   Family History   Problem Relation Age of Onset     Lipids Mother      Psychotic Disorder Mother      anxiety     Hypertension Mother      Prostate Cancer Father       at 74     DIABETES Sister        Review of Systems   The 10 point Review of Systems is negative other than noted in the HPI or here.     Physical Exam   Temp: 99.8  F (37.7  C) Temp src: Oral BP: 120/53 Pulse: 86 Heart Rate: 86 Resp: 18 SpO2: 95 % O2 Device: Nasal cannula Oxygen Delivery: 2 LPM  Vital Signs with Ranges  Temp:  [96.9  F (36.1  C)-99.8  F (37.7  C)] 99.8  F (37.7  C)  Pulse:  [68-86] 86  Heart Rate:  [63-86] 86  Resp:  [12-20] 18  BP: (114-189)/(52-97) 120/53  SpO2:  [95 %-100 %] 95 %  0 lbs 0 oz    Constitutional: Overweight male lying in bed in no obvious distress.  Eyes: Eyes are clear normal EOM  HEENT: Nose mouth throat appear normal  Respiratory: Lungs are clear  Cardiovascular: Regular rate rhythm no murmur heard  GI: Belly is soft nontender  Lymph/Hematologic: No cervical lymph node enlargement  Genitourinary: Not examined  Skin: No obvious rash  Musculoskeletal: Dressing on the right hip after surgery, no sign of shadowing  Neurologic: Not formally tested, did not attempt to walk  Psychiatric: Alert, oriented with normal mood and affect    Data   -Data reviewed today: All pertinent laboratory and imaging results from this encounter were reviewed. I personally reviewed no images or EKG's today.  No lab results found in last 7 days.    Recent Results (from the past 24 hour(s))   XR Pelvis Port 1/2 Views    Narrative    PELVIS PORTABLE ONE TO TWO VIEWS  2018 1:00 PM     HISTORY:  Right total hip. Intraoperative pelvis. Osteoarthritis.    COMPARISON: Pelvis and hip x-rays dated 2017.      Impression    IMPRESSION:   Single  intraoperative image demonstrates right  acetabular and temporary right femoral prostheses without evidence for  hardware failure or periprosthetic fracture. Severe degenerative  changes of the left hip are again noted.    TONY SALVADOR MD   XR Pelvis w Hip Port Right 1 View    Narrative    PELVIS AD HIP PORTABLE RIGHT ONE VIEW February 12, 2018 1:37 PM     HISTORY: Postoperative.    COMPARISON: Intraoperative x-rays dated 2/12/2018, preoperative pelvis  x-rays dated 11/30/2017.      Impression    IMPRESSION:   1. There is a right total hip arthroplasty without evidence for  hardware failure or periprosthetic fracture. The femoral component is  now a permanent component. Gas is seen within the soft tissues. There  is some stranding in subcutaneous adipose tissues consistent with  inflammation from recent surgery. There are lateral skin staples.  2. Severe degenerative changes of left hip are again noted. These are  similar to the prior study from 11/30/2017.    TONY SALVADOR MD

## 2018-02-13 NOTE — PLAN OF CARE
Problem: Patient Care Overview  Goal: Plan of Care/Patient Progress Review  Discharge Planner PT   Patient plan for discharge: home  Current status: Patient requires mod A of 2 to go supine to sit, but patient states that his brother and wife can help him with this. Amb 50 ft with FWW CGA. Transfers FWW CGA. Patient wants to try 4 wheeled walker that his brother in law is bringing in.   Barriers to return to prior living situation: steps to get into the house  Recommendations for discharge: home with [facility] PT  Rationale for recommendations: Patient will need further PT and it will be difficult for him to get in and out of the house       Entered by: Deepthi Parham 02/13/2018 12:27 PM

## 2018-02-13 NOTE — PLAN OF CARE
Problem: Patient Care Overview  Goal: Plan of Care/Patient Progress Review  Discharge Planner PT   Patient plan for discharge: Home with home therapy with private transport and family support  Current status: Patient is a 57 year old year old male s/p R MARIMAR. Prior to admission, patient was living with spouse and other family with 5 stairs to enter, using 2 SPC for mobility, and requiring min assist for ADLs. Currently, patient is requiring min assistance for functional mobility and CGA for ambulation using FWW.   Barriers to return to prior living situation: Ability to complete stairs without HR to enter home.   Recommendations for discharge: Home with home PT with family support and private transport  Rationale for recommendations: Pt does have some difficulty with transfers, but once he is up, he moves well. His L hip pain is limiting how much he can rely on that hip to support him while his R hip recovers from surgery. Pt is very motivated to go home and works hard.        Entered by: Tania Plata 02/12/2018 6:47 PM       Thank you for your referral,     Tania Plata, PT, DPT  [phone]  [facility]

## 2018-02-13 NOTE — PROGRESS NOTES
S-(situation): Patient arrives to room 257 via cart from PAR    B-(background): Right MARIMAR    A-(assessment): Pt.was settled by day staff.His pain is present but mild.His blood pressure is elevated.He is alert and oriented.Dressing is dry and intact.    R-(recommendations): Orders reviewed with pt.. Will monitor patient per MD orders.     Inpatient nursing criteria listed below were met:    Health care directives status obtained and documented: Pt.doesn't have any and not interested.  Core Measures assessed (SSI): Yes  SCD's Documented: Yes  Influenza Vaccine assessment done and vaccine ordered if needed: Yes  Skin issues/needs documented:NA  Isolation needs addressed, if appropriate: NA  Fall Prevention: Care plan updated, Education given and documented Yes  MRSA swab completed for patient 55 years and older (exclude MARIMAR and TKA): NA  My Chart patient sign up addressed and documented: No  Care Plan initiated: Yes  Education Assessment documented:Yes  Education Documented (Pre-existing chronic infection such as, MRSA/VRE need education on admission): Yes  New medication patient education completed and documented (Possible Side Effects of Common Medications handout): Yes  Home medications if not able to send immediately home with family stored here: NA   Reminder note placed in discharge instructions: NA  Discharge planning review completed (admission navigator) Yes

## 2018-02-13 NOTE — PLAN OF CARE
Problem: Patient Care Overview  Goal: Plan of Care/Patient Progress Review  Outcome: Improving  Discharge Planner PT   Patient plan for discharge: home with wife and brother.    Current status: SBA with ambulation with front wheel walker. He is using an assist belt for his right leg to make supine to sit easier. He still needs mod to min assist with bed mobility and transfers. ie mainly supine to sit. He can move himself up in bed per self. Ambulation with walker -presently a front wheel walker- SBA of one. He did 130 ft alpprox with walker and SBA with therapist.     Barriers to return to prior living situation: managing stairs to get in and transitions    Recommendations for discharge: home with wife and brother plus home health PT    Rationale for recommendations: pt is having difficutly with transitions mainly because left is painful and is lacking in function. It is due for a MARIMAR also so we have to accomodate both hips with correct body mechanics. He is getting more use to doing the correct techniques for transitions and bed mobility.         Entered by: Shakira Slater 02/13/2018 3:44 PM

## 2018-02-13 NOTE — PLAN OF CARE
Problem: Patient Care Overview  Goal: Plan of Care/Patient Progress Review  Outcome: Improving  Pt.has some tingling to the top of his toes on the  right foot but he can feel me touch.His toes are warm with good cap refill.Lungs are clear and pt.does do incentive spirometer independently with good technique and is able to get up to 3000ml.His dressing is dry and intact.Pain is being controlled with 1 dilaudid every 3 hours and 1 dose of flexeril.He is tolerating a regular diet without any nausea and vomiting.

## 2018-02-14 ENCOUNTER — APPOINTMENT (OUTPATIENT)
Dept: PHYSICAL THERAPY | Facility: CLINIC | Age: 58
DRG: 470 | End: 2018-02-14
Attending: ORTHOPAEDIC SURGERY
Payer: COMMERCIAL

## 2018-02-14 ENCOUNTER — APPOINTMENT (OUTPATIENT)
Dept: OCCUPATIONAL THERAPY | Facility: CLINIC | Age: 58
DRG: 470 | End: 2018-02-14
Attending: ORTHOPAEDIC SURGERY
Payer: COMMERCIAL

## 2018-02-14 LAB
GLUCOSE SERPL-MCNC: 113 MG/DL (ref 70–99)
HGB BLD-MCNC: 10 G/DL (ref 13.3–17.7)

## 2018-02-14 PROCEDURE — 12000000 ZZH R&B MED SURG/OB

## 2018-02-14 PROCEDURE — 97116 GAIT TRAINING THERAPY: CPT | Mod: GP | Performed by: PHYSICAL THERAPIST

## 2018-02-14 PROCEDURE — 36415 COLL VENOUS BLD VENIPUNCTURE: CPT | Performed by: ORTHOPAEDIC SURGERY

## 2018-02-14 PROCEDURE — 97530 THERAPEUTIC ACTIVITIES: CPT | Mod: GP | Performed by: PHYSICAL THERAPIST

## 2018-02-14 PROCEDURE — 97110 THERAPEUTIC EXERCISES: CPT | Mod: GP | Performed by: PHYSICAL THERAPIST

## 2018-02-14 PROCEDURE — 82947 ASSAY GLUCOSE BLOOD QUANT: CPT | Performed by: ORTHOPAEDIC SURGERY

## 2018-02-14 PROCEDURE — 25000132 ZZH RX MED GY IP 250 OP 250 PS 637: Performed by: ORTHOPAEDIC SURGERY

## 2018-02-14 PROCEDURE — 40000193 ZZH STATISTIC PT WARD VISIT: Performed by: PHYSICAL THERAPIST

## 2018-02-14 PROCEDURE — 85018 HEMOGLOBIN: CPT | Performed by: ORTHOPAEDIC SURGERY

## 2018-02-14 PROCEDURE — 97535 SELF CARE MNGMENT TRAINING: CPT | Mod: GO

## 2018-02-14 PROCEDURE — 40000133 ZZH STATISTIC OT WARD VISIT

## 2018-02-14 RX ORDER — ASPIRIN 325 MG/1
325 TABLET, FILM COATED ORAL EVERY 12 HOURS
Qty: 60 TABLET | Refills: 0 | Status: SHIPPED | OUTPATIENT
Start: 2018-02-14 | End: 2018-03-16

## 2018-02-14 RX ORDER — ACETAMINOPHEN 325 MG/1
650 TABLET ORAL EVERY 4 HOURS PRN
Qty: 100 TABLET | Refills: 0 | Status: ON HOLD | OUTPATIENT
Start: 2018-02-15 | End: 2022-09-12

## 2018-02-14 RX ORDER — AMOXICILLIN 250 MG
1 CAPSULE ORAL 2 TIMES DAILY PRN
Qty: 100 TABLET | Refills: 0 | Status: SHIPPED | OUTPATIENT
Start: 2018-02-14 | End: 2018-04-14

## 2018-02-14 RX ORDER — HYDROMORPHONE HYDROCHLORIDE 2 MG/1
2-4 TABLET ORAL EVERY 4 HOURS PRN
Qty: 60 TABLET | Refills: 0 | Status: SHIPPED | OUTPATIENT
Start: 2018-02-14 | End: 2018-03-20

## 2018-02-14 RX ORDER — HYDROXYZINE HYDROCHLORIDE 25 MG/1
25 TABLET, FILM COATED ORAL EVERY 6 HOURS PRN
Qty: 60 TABLET | Refills: 0 | Status: SHIPPED | OUTPATIENT
Start: 2018-02-14 | End: 2018-03-20

## 2018-02-14 RX ORDER — CYCLOBENZAPRINE HCL 10 MG
10 TABLET ORAL 3 TIMES DAILY PRN
Qty: 60 TABLET | Refills: 0 | Status: SHIPPED | OUTPATIENT
Start: 2018-02-14 | End: 2018-03-20

## 2018-02-14 RX ADMIN — CYCLOBENZAPRINE HYDROCHLORIDE 10 MG: 10 TABLET, FILM COATED ORAL at 07:29

## 2018-02-14 RX ADMIN — HYDROXYZINE HYDROCHLORIDE 25 MG: 25 TABLET ORAL at 01:58

## 2018-02-14 RX ADMIN — LISINOPRIL 10 MG: 10 TABLET ORAL at 09:30

## 2018-02-14 RX ADMIN — ASPIRIN 325 MG: 325 TABLET, FILM COATED ORAL at 09:30

## 2018-02-14 RX ADMIN — HYDROMORPHONE HYDROCHLORIDE 4 MG: 2 TABLET ORAL at 14:51

## 2018-02-14 RX ADMIN — HYDROMORPHONE HYDROCHLORIDE 4 MG: 2 TABLET ORAL at 07:29

## 2018-02-14 RX ADMIN — MELOXICAM 7.5 MG: 7.5 TABLET ORAL at 09:30

## 2018-02-14 RX ADMIN — HYDROMORPHONE HYDROCHLORIDE 4 MG: 2 TABLET ORAL at 04:02

## 2018-02-14 RX ADMIN — CYCLOBENZAPRINE HYDROCHLORIDE 10 MG: 10 TABLET, FILM COATED ORAL at 22:21

## 2018-02-14 RX ADMIN — HYDROMORPHONE HYDROCHLORIDE 4 MG: 2 TABLET ORAL at 22:21

## 2018-02-14 RX ADMIN — GABAPENTIN 300 MG: 300 CAPSULE ORAL at 09:30

## 2018-02-14 RX ADMIN — ACETAMINOPHEN 975 MG: 325 TABLET ORAL at 01:57

## 2018-02-14 RX ADMIN — HYDROXYZINE HYDROCHLORIDE 25 MG: 25 TABLET ORAL at 09:30

## 2018-02-14 RX ADMIN — HYDROMORPHONE HYDROCHLORIDE 4 MG: 2 TABLET ORAL at 11:00

## 2018-02-14 RX ADMIN — MAGNESIUM OXIDE TAB 400 MG (241.3 MG ELEMENTAL MG) 400 MG: 400 (241.3 MG) TAB at 22:21

## 2018-02-14 RX ADMIN — HYDROMORPHONE HYDROCHLORIDE 4 MG: 2 TABLET ORAL at 18:12

## 2018-02-14 RX ADMIN — ASPIRIN 325 MG: 325 TABLET, FILM COATED ORAL at 22:21

## 2018-02-14 RX ADMIN — METOPROLOL SUCCINATE 25 MG: 25 TABLET, EXTENDED RELEASE ORAL at 09:29

## 2018-02-14 RX ADMIN — ACETAMINOPHEN 975 MG: 325 TABLET ORAL at 09:28

## 2018-02-14 RX ADMIN — ACETAMINOPHEN 975 MG: 325 TABLET ORAL at 18:11

## 2018-02-14 RX ADMIN — METOPROLOL SUCCINATE 25 MG: 25 TABLET, EXTENDED RELEASE ORAL at 22:21

## 2018-02-14 NOTE — PLAN OF CARE
Discharge Planner OT   Patient plan for discharge: Home with assistance from family and return to work in 2 weeks    Current status: Patient does require min-mod A with LB dressing/bathing, and toileting to maintain precautions  Barriers to return to prior living situation: None anticipated  Recommendations for discharge: Home with assistance and AE to maintain precautions.   Rationale for recommendations: Patient is aware of his precautions.  He has had limited ROM and mobility with increased need for assistance with BADL due to pain prior to surgery.  Patient is aware of AE to increase his independence.        Entered by: Jackie Lynn 02/13/2018 2:15 PM

## 2018-02-14 NOTE — PROGRESS NOTES
[facility] Orthopedic Post-Op Note         Assessment and Plan:    Assessment:   Post-operative day #2  Total hip arthoplasty (Right)  Procedure(s):  ARTHROPLASTY HIP  Doing well.  No immediate surgical complications identified.  No excessive bleeding  Tolerating physical therapy and rehabilitation well.  Patient is complaining of diffuse aches and pains in other joints.  Suspect this is related to his inactivity with hospitalization       Plan:   Advance activity as tolerated  We will see how he performs with physical therapy this morning.  Our hopes is that he can be discharged later today.           Interval History:   Doing well.  Continues to improve.  Pain is well-controlled.  No fevers.  Patient still having some aching pain in his shoulders and neck.  However he has excellent range of motion of these joints.  My suspicion is it is related to him eating more bedridden than usual.            Physical Exam:   All vitals have been reviewed  Patient Vitals for the past 8 hrs:   BP Temp Temp src Pulse Heart Rate Resp SpO2   02/14/18 0725 117/55 97.9  F (36.6  C) Oral 70 70 16 95 %     I/O last 3 completed shifts:  In: 2080 [P.O.:2080]  Out: 1100 [Urine:1100]    Dressing dry and intact.  Dressing to be changed later today.           Data:   All laboratory/imaging data related to this surgery reviewed  Results for orders placed or performed during the hospital encounter of 02/12/18 (from the past 24 hour(s))   Care Transition RN/SW IP Consult    Narrative    Shruthi Dickinson     2/13/2018  2:31 PM  CARE TRANSITION SOCIAL WORK INITIAL ASSESSMENT:  Reason For Consult: discharge planning   Met with: Patient.    DATA  Active Problems:    Hyperlipidemia LDL goal <100    Morbid obesity (H)    CAD (coronary artery disease)    Benign essential hypertension    S/P total hip arthroplasty       Primary Care Clinic Name: [facility]  Primary Care MD Name: Elias Gonzalez  Contact information and PCP information  verified: Yes      ASSESSMENT  Cognitive Status: awake, alert and oriented.             Lives With: spouse, sibling(s)  Living Arrangements: house  Quality Of Family Relationships: supportive, involved, helpful  Description of Support System: Supportive, Involved   Who is your support system?: Wife, Sibling(s)   Support Assessment: Adequate family and caregiver support   Insurance Concerns: No Insurance issues identified        This writer met with pt and introduced self and role. Discussed   discharge planning and guidelines in regards [facility] with [facility]   [facility]. Patients wife and brother will be home with patient   during recovery. Patient will be going home with home PT.       PLAN    Patient want [facility]. [facility]    Discharge Planner   Discharge Plans in progress: yes   Barriers to discharge plan: none   Follow up plan: go to any follow up appointments        Entered by: Shruthi Dickinson 02/13/2018 11:04 AM          Hemoglobin   Result Value Ref Range    Hemoglobin 10.0 (L) 13.3 - 17.7 g/dL   Glucose   Result Value Ref Range    Glucose 113 (H) 70 - 99 mg/dL        Vineet Bedolla MD

## 2018-02-14 NOTE — PLAN OF CARE
Problem: Patient Care Overview  Goal: Plan of Care/Patient Progress Review  Discharge Planner PT   Patient plan for discharge: Home with wife and brother.  Current status: Pt was able to ambulate 100 ft with 4 wheeled walker that he has for home and SBA.  Pt is very slow with a step to gait pattern however very safe.  Pt is able to complete bed mobility with Levar of PT helping lift R LE into bed.  Pt is slow at adjusting his position in bed, however able to do it independently.  Completed 4 steps today with education on how to use a standard walker as pt doesn't have hand railings.  States the steps at home are deep enough to have the walker on each step.  Barriers to return to prior living situation: None at this time.  Recommendations for discharge: Home with support from wife and brother, with home therapy services.  Rationale for recommendations: Pt is able to complete activities safely just slow and needs encouragement.       Entered by: Kaylene Ortega 02/14/2018 11:45 AM     Thank you for your referral.    Kaylene Ortega, PT, DPT  [facility]  [phone]

## 2018-02-14 NOTE — PROGRESS NOTES
Name: Markie Rico    MRN#: [id number]    Reason for Hospitalization: cristian, portable xray  S/P total hip arthroplasty    Discharge Date: 2/14/2018    Patient / Family response to discharge plan: Patient is in agreement with Plan.     Follow-Up Appt: Future Appointments  Date Time Provider Department Center   2/14/2018 2:45 PM Kaylene Paredes, PT [facility]   2/15/2018 9:30 AM Tania Plata, PT [facility]   2/26/2018 1:10 PM Vineet Bedolla MD [facility]       Other Providers (Care Coordinator, County Services, PCA services etc):  Yes: [facility]     Discharge Disposition: home with home PT León Mane  Social Work Student

## 2018-02-14 NOTE — PLAN OF CARE
"Problem: Patient Care Overview  Goal: Plan of Care/Patient Progress Review  Outcome: Declining  Patient post Op day 2 from R MARIMAR  Vitals signs stable on RA, afebrile.  Using Incentive spirometry to 2500. Lung sounds are clear. Dressing is CDI, ice applied, CMS intact. Pain inadequately controlled with 4mg PO dilaudid every 3hrs, flexeril and atarax.  Pt refusing to roll in bed, complaints of feeling very stiff and \"beat up\" questioning \"over-doing\" it yesterday.  Staff encouraged to get up after pain meds kick in, would help with stiff/achiness, Pt said, \"I would if I could, I can't.\"  Staff reported to charge RN.          "

## 2018-02-14 NOTE — PROGRESS NOTES
02/13/18 1310   Quick Adds   Type of Visit Initial Occupational Therapy Evaluation   Living Environment   Lives With spouse;sibling(s)   Living Arrangements house   Home Accessibility bed and bath on same level;stairs to enter home;stairs within home;tub/shower is not walk in   Transportation Available car;family or friend will provide   Living Environment Comment Patient lives in a house with his wife and brother.  Patient has facilities on one level.  He does have a tub/shower.     Functional Level Prior   Ambulation 1-->assistive equipment   Transferring 1-->assistive equipment   Toileting 1-->assistive equipment   Bathing 2-->assistive person   Dressing 1-->assistive equipment   Eating 0-->independent   Communication 0-->understands/communicates without difficulty   Swallowing 0-->swallows foods/liquids without difficulty   Cognition 0 - no cognition issues reported   Prior Functional Level Comment Patient did have assistance from wife with bathing for safety and washing his back.  Patient reports that for the past year he has had ROM limitations and pain that limited his functional ability.    General Information   Onset of Illness/Injury or Date of Surgery - Date 02/12/18   Referring Physician Dr. Bedolla   Patient/Family Goals Statement Patient would like to return home and complete BADL with greater independence.    Additional Occupational Profile Info/Pertinent History of Current Problem Patient is a 57 year old male who underwent elective Right MARIMAR.  Patient did report that he already has plans for the other hip to be replaced as well.  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  He reports that at times is very demanding due to deadlines.    Precautions/Limitations right hip precautions   Weight-Bearing Status - RLE partial weight-bearing (% in comments)  (80% which is essentially full weight bear with walker)   Cognitive Status Examination   Orientation orientation to person, place and time  "  Range of Motion (ROM)   ROM Comment BUE WFL   Strength   Strength Comments BUE WFL   Bathing   Level of Lennon moderate assist (50% patients effort)   Physical Assist/Nonphysical Assist supervision;verbal cues;1 person assist;set-up required   Upper Body Dressing   Level of Lennon: Dress Upper Body independent   Lower Body Dressing   Level of Lennon: Dress Lower Body moderate assist (50% patients effort)   Physical Assist/Nonphysical Assist: Dress Lower Body supervision;verbal cues;1 person assist;set-up required   Toileting   Level of Lennon: Toilet minimum assist (75% patients effort)   Physical Assist/Nonphysical Assist: Toilet supervision;verbal cues;1 person assist   Grooming   Level of Lennon: Grooming stand-by assist   Eating/Self Feeding   Level of Lennon: Eating independent   Instrumental Activities of Daily Living (IADL)   IADL Comments Patient does have assistance from wife and brother to complete IADL   Activities of Daily Living Analysis   Impairments Contributing to Impaired Activities of Daily Living pain;post surgical precautions   General Therapy Interventions   Planned Therapy Interventions ADL retraining   Clinical Impression   Criteria for Skilled Therapeutic Interventions Met yes, treatment indicated   OT Diagnosis Decreased ability to complete BADL with hip precautions   Influenced by the following impairments pain;post surgical precautions   Assessment of Occupational Performance 3-5 Performance Deficits   Identified Performance Deficits Dressing, bathing, toileting, work   Clinical Decision Making (Complexity) Low complexity   Therapy Frequency daily   Predicted Duration of Therapy Intervention (days/wks) 2 days   Anticipated Discharge Disposition Home with Assist   Risks and Benefits of Treatment have been explained. Yes   Patient, Family & other staff in agreement with plan of care Yes   [facility] AM-PAC TM \"6 Clicks\"   2016, Trustees of [employer] " "[employer], under license to [employer].  All rights reserved.   6 Clicks Short Forms Daily Activity Inpatient Short Form   [facility] AM-PAC  \"6 Clicks\" Daily Activity Inpatient Short Form   1. Putting on and taking off regular lower body clothing? 2 - A Lot   2. Bathing (including washing, rinsing, drying)? 2 - A Lot   3. Toileting, which includes using toilet, bedpan or urinal? 3 - A Little   4. Putting on and taking off regular upper body clothing? 4 - None   5. Taking care of personal grooming such as brushing teeth? 4 - None   6. Eating meals? 4 - None   Daily Activity Raw Score (Score out of 24.Lower scores equate to lower levels of function) 19   Total Evaluation Time   Total Evaluation Time (Minutes) 14     FEI Muñoz  [facility]  [phone]      "

## 2018-02-14 NOTE — PLAN OF CARE
"Discharge Planner OT   Patient plan for discharge: Home with assistance from family  Current status: Patient currently tired and stated \"I am finally comfortable and feel like I could sleep.\"  Education provided with patient on hip precautions and AE.    Barriers to return to prior living situation: None ancitipated  Recommendations for discharge: Home with assistance from family   Rationale for recommendations: Patient does verbalize his precautions and states that his wife is going to assist with LB dressing at least until his first f/u appointment with his surgeon.        Entered by: Jackie Lynn 02/14/2018 1:49 PM        "

## 2018-02-15 ENCOUNTER — APPOINTMENT (OUTPATIENT)
Dept: PHYSICAL THERAPY | Facility: CLINIC | Age: 58
DRG: 470 | End: 2018-02-15
Attending: ORTHOPAEDIC SURGERY
Payer: COMMERCIAL

## 2018-02-15 VITALS
OXYGEN SATURATION: 95 % | HEART RATE: 71 BPM | DIASTOLIC BLOOD PRESSURE: 58 MMHG | TEMPERATURE: 97.6 F | RESPIRATION RATE: 16 BRPM | SYSTOLIC BLOOD PRESSURE: 120 MMHG

## 2018-02-15 PROCEDURE — 97116 GAIT TRAINING THERAPY: CPT | Mod: GP

## 2018-02-15 PROCEDURE — 97530 THERAPEUTIC ACTIVITIES: CPT | Mod: GP

## 2018-02-15 PROCEDURE — 25000132 ZZH RX MED GY IP 250 OP 250 PS 637: Performed by: ORTHOPAEDIC SURGERY

## 2018-02-15 PROCEDURE — 40000193 ZZH STATISTIC PT WARD VISIT

## 2018-02-15 RX ADMIN — ACETAMINOPHEN 975 MG: 325 TABLET ORAL at 08:27

## 2018-02-15 RX ADMIN — ACETAMINOPHEN 975 MG: 325 TABLET ORAL at 01:12

## 2018-02-15 RX ADMIN — HYDROMORPHONE HYDROCHLORIDE 4 MG: 2 TABLET ORAL at 08:28

## 2018-02-15 RX ADMIN — LISINOPRIL 10 MG: 10 TABLET ORAL at 08:27

## 2018-02-15 RX ADMIN — METOPROLOL SUCCINATE 25 MG: 25 TABLET, EXTENDED RELEASE ORAL at 08:27

## 2018-02-15 RX ADMIN — HYDROMORPHONE HYDROCHLORIDE 4 MG: 2 TABLET ORAL at 04:00

## 2018-02-15 RX ADMIN — HYDROMORPHONE HYDROCHLORIDE 4 MG: 2 TABLET ORAL at 13:01

## 2018-02-15 RX ADMIN — HYDROMORPHONE HYDROCHLORIDE 4 MG: 2 TABLET ORAL at 01:12

## 2018-02-15 RX ADMIN — ASPIRIN 325 MG: 325 TABLET, FILM COATED ORAL at 08:27

## 2018-02-15 RX ADMIN — GABAPENTIN 300 MG: 300 CAPSULE ORAL at 08:27

## 2018-02-15 RX ADMIN — MELOXICAM 7.5 MG: 7.5 TABLET ORAL at 08:27

## 2018-02-15 ASSESSMENT — PAIN DESCRIPTION - DESCRIPTORS: DESCRIPTORS: ACHING

## 2018-02-15 NOTE — PLAN OF CARE
Problem: Patient Care Overview  Goal: Plan of Care/Patient Progress Review  Outcome: Adequate for Discharge Date Met: 02/15/18  Pt is alert and oriented. Vital signs stable, afebrile. CMS intact. Aquacel dressing to right hip CDI. Pain controlled with Dilaudid 4mg oral q4h. Pt rested comfortably throughout the night.

## 2018-02-15 NOTE — PROGRESS NOTES
S-(situation): Patient discharged to home via W/C with family    B-(background): RTHA    A-(assessment): Please review system assessment and VS.  Note pt with good pain control and will discharge to home.    R-(recommendations): Discharge instructions reviewed with pt. Listed belongings gathered and returned to patient. Sent with pt.         Discharge Nursing Criteria:     Care Plan and Patient education resolved: Yes    New Medications- pt has been educated about purpose and side effects: Yes    Vaccines  Influenza status verified at discharge:  Yes          MISC  Prescriptions if needed, hard copies sent with patient  Yes  Home and hospital aquired medications returned to patient: NA  Medication Bin checked and emptied on discharge Yes  Patient reports post-discharge pain management plan is effective: Yes    TKA/MARIMAR ONLY:   Discharged with MANDY Stockings Yes  MANDY stocking Education Completed Yes

## 2018-02-15 NOTE — PLAN OF CARE
Problem: Patient Care Overview  Goal: Plan of Care/Patient Progress Review  Outcome: Therapy, progress toward functional goals as expected  S-(situation): shift note    B-(background): Right MARIMAR    A-(assessment): Pt is A&O.  VSS.  Afebrile.  Having right hip pain.  Dilaudid has been effective for this.  New dressing on - D&I.  Still having trouble getting in and out of bed.  Decided to stray one more night.    R-(recommendations): Will cont to monitor.

## 2018-02-15 NOTE — PLAN OF CARE
Problem: Patient Care Overview  Goal: Plan of Care/Patient Progress Review  Discharge Planner PT   Patient plan for discharge: Home with home therapy with family support and private transport  Current status: Pt ambulated a distance of about 250 feet with 4WW with SBA for safety. Pt utilized good step through pattern and only occasional cues to keep proximity to walker. Verbally reviewed exercises and pt demonstrates good understanding, verbally reviewed posterior hip precautions with good understanding. Discussed safe car transfers while maintaining hip precautions. Pt slow with movement, but appears safe with mod I.   Barriers to return to prior living situation: None noted from a mobility standpoint at this time.  Recommendations for discharge: Home with home therapy with family support and private transport.  Rationale for recommendations: Pt is moving well at this time, if only slowly. Pt has good family support for return home.       Entered by: Tania Plata 02/15/2018 10:27 AM        Physical Therapy Discharge Summary    Reason for therapy discharge:    Discharged to home with home therapy.    Progress towards therapy goal(s). See goals on Care Plan in [facility] record for goal details.  Goals met    Therapy recommendation(s):    Continued therapy is recommended.  Rationale/Recommendations:  to progress strengthening s/p MARIMAR.      Thank you for your referral,     Tania Plata, PT, DPT  [phone]  [facility]

## 2018-02-15 NOTE — DISCHARGE SUMMARY
[facility] Discharge Summary    Markie Rico MRN# [id number]   Age: 57 year old YOB: 1960     Date of Admission:  2/12/2018  Date of Discharge::  2/15/2018  Admitting Physician:  Vineet Bedolla MD  Discharge Physician:  Aide Dias PA-C     Home clinic: [facility]          Admission Diagnoses:   cristian, portable xray  S/P total hip arthroplasty  obesity          Discharge Diagnosis:   cristian, portable xray  S/P total hip arthroplasty  Acute blood loss anemia  obesity          Procedures:   Procedure(s): Procedure(s):  ARTHROPLASTY HIP right       No other procedures performed during this admission           Medications Prior to Admission:     Prescriptions Prior to Admission   Medication Sig Dispense Refill Last Dose     Multiple Vitamins-Minerals (CENTRUM MEN PO) Take 1 tablet by mouth daily        Vitamin D, Cholecalciferol, 400 UNITS TABS Take 1 tablet by mouth daily        MAGNESIUM OXIDE PO Take 400 mg by mouth daily    2/11/2018 at 2100     NIACIN, ANTIHYPERLIPIDEMIC, PO Take 500 mg by mouth At Bedtime    2/11/2018 at 2100     gabapentin (NEURONTIN) 300 MG capsule Take 1 capsule (300 mg) by mouth daily 30 capsule 1 2/11/2018 at 2100     meloxicam (MOBIC) 7.5 MG tablet Take 1 tablet (7.5 mg) by mouth daily 30 tablet 1 2/11/2018 at 2100     Cinnamon Bark POWD    Past Week at 02/10/18     Capsicum, Cayenne, (CAYENNE PO)    Past Week at 02/10/18     TURMERIC PO    02/10/18 at 21     metoprolol (TOPROL XL) 50 MG 24 hr tablet Take 1 tablet by mouth daily. (Patient taking differently: Take 25 mg by mouth 2 times daily ) 90 tablet 3 2/12/2018 at 0600     lisinopril (PRINIVIL,ZESTRIL) 10 MG tablet Take 1 tablet by mouth daily. Take 10 mg by mouth daily. 90 tablet 3 2/11/2018 at 2100     POTASSIUM CHLORIDE CR PO Take 10 mEq by mouth daily.   2/11/2018 at 2100     [DISCONTINUED] Naproxen Sodium (ALEVE PO) Take 220 mg by mouth 3 times daily (with meals)    2/12/2018 at 0600     atorvastatin (LIPITOR) 80 MG tablet Take 80 mg by mouth daily   More than a month at Unknown time     [DISCONTINUED] TRAMADOL HCL PO Take 100 mg by mouth 3 times daily as needed for moderate to severe pain   2/12/2018 at 0600     [DISCONTINUED] glucosamine-chondroitin 500-400 MG CAPS per capsule Take 1 capsule by mouth daily   More than a month at Unknown time     nitroglycerin (NITROSTAT) 0.4 MG SL tablet Place 1 tablet under the tongue every 5 minutes as needed. 15 tablet 3 More than a month at Unknown time             Discharge Medications:     Current Discharge Medication List      START taking these medications    Details   HYDROmorphone (DILAUDID) 2 MG tablet Take 1-2 tablets (2-4 mg) by mouth every 4 hours as needed for other (pain control or improvement in physical function. Hold dose for analgesic side effects.)  Qty: 60 tablet, Refills: 0    Associated Diagnoses: Status post total replacement of right hip      acetaminophen (TYLENOL) 325 MG tablet Take 2 tablets (650 mg) by mouth every 4 hours as needed for other (multimodal surgical pain management along with NSAIDS and opioid medication as indicated based on pain control and physical function.)  Qty: 100 tablet, Refills: 0    Associated Diagnoses: Status post total replacement of hip, unspecified laterality      aspirin - buffered (ASCRIPTIN) 325 MG TABS tablet Take 1 tablet (325 mg) by mouth every 12 hours  Qty: 60 tablet, Refills: 0    Associated Diagnoses: Deep vein thrombosis (DVT) prophylaxis prescribed at discharge      senna-docusate (SENOKOT-S;PERICOLACE) 8.6-50 MG per tablet Take 1 tablet by mouth 2 times daily as needed for constipation  Qty: 100 tablet, Refills: 0    Associated Diagnoses: Status post total replacement of right hip      order for DME Equipment being ordered: Walker ()  Treatment Diagnosis: s/p joint replacement  Qty: 1 Device, Refills: 0    Associated Diagnoses: Status post total replacement of right  hip      cyclobenzaprine (FLEXERIL) 10 MG tablet Take 1 tablet (10 mg) by mouth 3 times daily as needed for muscle spasms  Qty: 60 tablet, Refills: 0    Associated Diagnoses: Status post total replacement of right hip      hydrOXYzine (ATARAX) 25 MG tablet Take 1 tablet (25 mg) by mouth every 6 hours as needed for itching or other (augment pain control)  Qty: 60 tablet, Refills: 0    Associated Diagnoses: Status post total replacement of right hip         CONTINUE these medications which have NOT CHANGED    Details   Multiple Vitamins-Minerals (CENTRUM MEN PO) Take 1 tablet by mouth daily      Vitamin D, Cholecalciferol, 400 UNITS TABS Take 1 tablet by mouth daily      MAGNESIUM OXIDE PO Take 400 mg by mouth daily       NIACIN, ANTIHYPERLIPIDEMIC, PO Take 500 mg by mouth At Bedtime       gabapentin (NEURONTIN) 300 MG capsule Take 1 capsule (300 mg) by mouth daily  Qty: 30 capsule, Refills: 1    Associated Diagnoses: Pre-op exam      meloxicam (MOBIC) 7.5 MG tablet Take 1 tablet (7.5 mg) by mouth daily  Qty: 30 tablet, Refills: 1    Associated Diagnoses: Pre-op exam      Cinnamon Bark POWD       Capsicum, Cayenne, (CAYENNE PO)       TURMERIC PO       metoprolol (TOPROL XL) 50 MG 24 hr tablet Take 1 tablet by mouth daily.  Qty: 90 tablet, Refills: 3    Comments: Profile Rx: patient will contact pharmacy when needed    Associated Diagnoses: History of acute myocardial infarction of inferior wall; CAD (coronary artery disease)      lisinopril (PRINIVIL,ZESTRIL) 10 MG tablet Take 1 tablet by mouth daily. Take 10 mg by mouth daily.  Qty: 90 tablet, Refills: 3    Comments: Profile Rx: patient will contact pharmacy when needed    Associated Diagnoses: History of acute myocardial infarction of inferior wall; CAD (coronary artery disease)      POTASSIUM CHLORIDE CR PO Take 10 mEq by mouth daily.      atorvastatin (LIPITOR) 80 MG tablet Take 80 mg by mouth daily      nitroglycerin (NITROSTAT) 0.4 MG SL tablet Place 1 tablet  under the tongue every 5 minutes as needed.  Qty: 15 tablet, Refills: 3    Associated Diagnoses: History of acute myocardial infarction of inferior wall; CAD (coronary artery disease)         STOP taking these medications       Naproxen Sodium (ALEVE PO) Comments:   Reason for Stopping:         TRAMADOL HCL PO Comments:   Reason for Stopping:         glucosamine-chondroitin 500-400 MG CAPS per capsule Comments:   Reason for Stopping:                     Consultations:   No consultations were requested during this admission          Brief History of Illness:   Reason for admission requiring a surgical or invasive procedure:   cristian, portable xray  S/P total hip arthroplasty   The patient underwent the following procedure(s):   Procedure(s):  ARTHROPLASTY HIP right   There were no immediate complications during this procedure.    Please refer to the full operative summary for details.           Hospital Course:   The patient's hospital course was unremarkable.  He recovered as anticipated and experienced no post-operative complications.  Patient had experienced some pain of multiple joints postop day 2.  This pain however has subsided by postop day 3.  Hgb remained stable.  Lowest Hgb was 10.0.  Patient tolerating oral pain medications.  CPM at 0-90.  Tolerating therapy.      # Discharge Pain Plan:   - During his hospitalization, Markie experienced pain due to s/p Right Total Hip Arthroplasty and current OA of left hip with collapse.  The pain plan for discharge was discussed with Markie and the plan was created in a collaborative fashion.  Patient has been getting Dilaudid 4 mg every 3-4 hours to manage pain control.  - Opioids prescribed on discharge: dilaudid  - Duration of opioids after discharge: will be dependent on recovery  - Bowel regimen: senna  - Chronic/continued opioids: Discussed that patient will be progressively decreasing opioids despite the pain he has of his left hip which has not yet had surgery.   Continuance of prescription is based on patient's recovery and pain patterns.  This will be closely monitored         Discharge Instructions and Follow-Up:   Discharge diet: Pre-procedure diet or regular   Discharge activity: Activity as tolerated  Driving restrictions: no driving while taking narcotic analgesics  Weight bearing status: Weight bearing as tolerated   Discharge follow-up: Follow up with Dr. Bedolla in 10-14 days.  Patient should already have an appointment scheduled   Wound care: Aquacell dressing in place - OK to shower over this  Aquacell and staples to be removed at follow up           Discharge Disposition:   Discharged to home with home physical therapy services      Attestation:  I have reviewed today's vital signs, notes, medications, labs and imaging.    Aide Dias PA-C      No pertinent family history in first degree relatives

## 2018-02-15 NOTE — PROGRESS NOTES
[facility] the [facility] yesterday and was told that everything was good to go for [facility].  Writer was told that the orders were placed. [facility] called this morning stating that the orders have not been sent to [facility] yet. Writer called and talked with the [facility] confirming about the orders. [facility] states that they needed to talk with patient and [facility] first. Writer talked with Patient and patient is understanding of the situation. He has books to reference until he gets home PT.     Shruthi Mane   Student

## 2018-02-15 NOTE — PLAN OF CARE
Problem: Hip Arthroplasty (Total, Partial) (Adult)  Goal: Signs and Symptoms of Listed Potential Problems Will be Absent, Minimized or Managed (Hip Arthroplasty)  Signs and symptoms of listed potential problems will be absent, minimized or managed by discharge/transition of care (reference Hip Arthroplasty (Total, Partial) (Adult) CPG).   Outcome: Adequate for Discharge Date Met: 02/15/18  Note that pt is doing well, good pain control with oral analgesic's.  CMS is good.  VSS.  Eating well, up and about with walker.  Plan to discharge to home, with HHC and Home PT

## 2018-02-19 ENCOUNTER — CARE COORDINATION (OUTPATIENT)
Dept: CARE COORDINATION | Facility: CLINIC | Age: 58
End: 2018-02-19

## 2018-02-19 NOTE — PROGRESS NOTES
Clinic Care Coordination     Situation: Patient chart reviewed by care coordinator.    Background: Transition Communication Hand-off for Care Transitions to Next Level of Care Provider     Name: Markie Rico  MRN #: [id number]  Primary Care Provider: Alfred Abernathy  Primary Care MD Name: Elias Gonzalez  Primary Clinic: [address] [place] [address] DR JODY HINES [address]  [facility]  Reason for Hospitalization:  cristian, portable xray  S/P total hip arthroplasty  Admit Date/Time: 2/12/2018  8:42 AM  Discharge Date: 2/15/2018  Payor Source: Payor: COMMERCIAL / Plan: [facility] / Product Type: Indemnity /      Readmission Assessment Measure (FATEMEH) Risk Score/category: low         Reason for Communication Hand-off Referral: Fragility  Other total Right hip replacement     Assessment: Last PCP appointment 11/23/12.  [facility]. Has appointment in place with Dr Bedolla  2/26/2018 1:10 PM Vineet Bedolla MD  Orthopedic Surgery         Plan/Recommendations: A CTS call will not be placed.    Pan Chavez RN  Clinic Care Coordinator  [facility]  [phone]

## 2018-02-21 NOTE — PROGRESS NOTES
"Markie Rico  Gender: male  : 1960  [address]  [facility] [id number] [phone] (home)     Medical Record: [id number]  Pharmacy:    [facility] - [place], [place] - [address]  [facility] - [place], [place] - [address]  Primary Care Provider: Alfred Abernathy    Parent's names are: Data Unavailable (mother) and Data Unavailable (father).      [facility]  2018     Discharge Phone Call:  Key Words/Key Times      Introduction - AIDET (Acknowledge, Introduce, Duration, Explanation)      Empathy-   We are calling to see how you are since your recent stay in the hospital?     Call back COMMENTS: Doing much better      Clinical Questions -  (f/u appts, medication side effects/purpose, ability to care for self at home) \"For your safety, it is important to us that you understand the purpose and side effects of your medications, can you tell me what your new medications are?\"     Call back COMMENTS:  Patient understands medications and side effects; and the follow-up appt was discussed.        Staff Recognition -  We like to recognize staff and physicians who have done an excellent job.  Do you remember any people from your care team that you would like recognize?     Call back COMMENTS: Everyone who cared for me and on the team was AWESOME!!!       Very Good Care -  We want to provide very good care to all patients.  How was your care?     Call back COMMENTS:  It couldn't have been better!      Opportunities for Improvement -  Our goal is to be the best.  Do you have any suggestions for things that we could improve upon?     Call back COMMENTS: No      Thank You         "

## 2018-02-27 ENCOUNTER — OFFICE VISIT (OUTPATIENT)
Dept: ORTHOPEDICS | Facility: CLINIC | Age: 58
End: 2018-02-27
Payer: COMMERCIAL

## 2018-02-27 VITALS — HEIGHT: 68 IN | TEMPERATURE: 98.1 F

## 2018-02-27 DIAGNOSIS — Z96.641 STATUS POST TOTAL HIP REPLACEMENT, RIGHT: Primary | ICD-10-CM

## 2018-02-27 PROCEDURE — 99024 POSTOP FOLLOW-UP VISIT: CPT | Performed by: ORTHOPAEDIC SURGERY

## 2018-02-27 ASSESSMENT — PAIN SCALES - GENERAL: PAINLEVEL: MODERATE PAIN (4)

## 2018-02-27 NOTE — PROGRESS NOTES
"HISTORY OF PRESENT ILLNESS:    Markie Rico is a 57 year old male who is seen in follow up for   Chief Complaint   Patient presents with     RECHECK     Right total hip arthroplasty.  DOS: 2/12/18 (14 days postop)     Surgical Followup     POSTOPERATIVE DIAGNOSIS:  Degenerative arthritis, right hip. SECONDARY DIAGNOSES:  Morbid obesity. PROCEDURE:  Right total hip arthroplasty through a posterior approach utilizing [place] 60 mm in diameter Tritanium cluster cup with a single fixation screw, 6 Accolade 2 femoral stem press-fit, +0 Biolox femoral head, 36 mm in diameter  X3 polyethylene liner.     Treatments tried to this point: This is first postsurgical visit.  Present symptoms: He is very pleased with the pain relief is gotten following his right total hip.  He finds that he is much more mobile.  He still uses 2 canes to protect his opposite hip.  He has been able to substantially decrease his use of pain medicines.    Accompanied by his wife.      PHYSICAL EXAM:  Temp 98.1  F (36.7  C) (Temporal)  Ht 1.715 m (5' 7.5\")  There is no height or weight on file to calculate BMI.       Physical Exam:  Vitals: Temp 98.1  F (36.7  C) (Temporal)  Ht 1.715 m (5' 7.5\")  BMI= There is no height or weight on file to calculate BMI.  Constitutional: healthy, alert and no acute distress   Psychiatric: mentation appears normal and affect normal/bright    JOINT/EXTREMITIES:  NEURO: no focal deficits  Affected extremity pulses are easily palpable.  SKIN: no excoriation or erythema. No signs of infection. Surgical wound is healthy.        GAIT: Notably he is able to get out of the chair much easier at this time that he was presurgery.  He is much more mobile on the to cane technique.        Swelling: Appropriate amount of postoperative swelling and bruising.    ROM: Hip range of motion is tolerated well.    Strength: Appropriate really weak at this time.    Staples were removed.    Despite not wearing his MANDY stockings today the " right lower extremity swelling is relatively minimal.      IMAGING INTERPRETATION: No new x-rays were taken today.     ASSESSMENT:    ICD-10-CM    1. Status post total hip replacement, right Z96.641        Doing well    PLAN:      Edema control reviewed.  We reviewed very aggressively today edema control measures and Y.    Pain medication usage reviewed.  He does not request a refill    Physical Therapy/Activity: Not at this time.    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  He wants to be able to return to doing some activities.  We gave him a schedule of no more than 2 hours of work followed by 1 hour of complete elevation of the extremity.    He is motivated to lose 50 pounds.  We suggested that if he loses 25 he could consider scheduling the opposite hip.    Return to clinic 3, weeks, patient should be weighed prior to being seen.  He should have a postoperative hip x-ray.  He will need to be referred to therapy at that time.    Vineet Bedolla MD

## 2018-02-27 NOTE — LETTER
"    2/27/2018         RE: Markie Rico  [address] [facility] [address]        Dear Colleague,    Thank you for referring your patient, Markie Rico, to the [facility]. Please see a copy of my visit note below.    HISTORY OF PRESENT ILLNESS:    Markie Rico is a 57 year old male who is seen in follow up for   Chief Complaint   Patient presents with     RECHECK     Right total hip arthroplasty.  DOS: 2/12/18 (14 days postop)     Surgical Followup     POSTOPERATIVE DIAGNOSIS:  Degenerative arthritis, right hip. SECONDARY DIAGNOSES:  Morbid obesity. PROCEDURE:  Right total hip arthroplasty through a posterior approach utilizing [place] 60 mm in diameter Tritanium cluster cup with a single fixation screw, 6 Accolade 2 femoral stem press-fit, +0 Biolox femoral head, 36 mm in diameter  X3 polyethylene liner.     Treatments tried to this point: This is first postsurgical visit.  Present symptoms: He is very pleased with the pain relief is gotten following his right total hip.  He finds that he is much more mobile.  He still uses 2 canes to protect his opposite hip.  He has been able to substantially decrease his use of pain medicines.    Accompanied by his wife.      PHYSICAL EXAM:  Temp 98.1  F (36.7  C) (Temporal)  Ht 1.715 m (5' 7.5\")  There is no height or weight on file to calculate BMI.       Physical Exam:  Vitals: Temp 98.1  F (36.7  C) (Temporal)  Ht 1.715 m (5' 7.5\")  BMI= There is no height or weight on file to calculate BMI.  Constitutional: healthy, alert and no acute distress   Psychiatric: mentation appears normal and affect normal/bright    JOINT/EXTREMITIES:  NEURO: no focal deficits  Affected extremity pulses are easily palpable.  SKIN: no excoriation or erythema. No signs of infection. Surgical wound is healthy.        GAIT: Notably he is able to get out of the chair much easier at this time that he was presurgery.  He is much more mobile on the to cane " technique.        Swelling: Appropriate amount of postoperative swelling and bruising.    ROM: Hip range of motion is tolerated well.    Strength: Appropriate really weak at this time.    Staples were removed.    Despite not wearing his MANDY stockings today the right lower extremity swelling is relatively minimal.      IMAGING INTERPRETATION: No new x-rays were taken today.     ASSESSMENT:    ICD-10-CM    1. Status post total hip replacement, right Z96.641        Doing well    PLAN:      Edema control reviewed.  We reviewed very aggressively today edema control measures and Y.    Pain medication usage reviewed.  He does not request a refill    Physical Therapy/Activity: Not at this time.    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  He wants to be able to return to doing some activities.  We gave him a schedule of no more than 2 hours of work followed by 1 hour of complete elevation of the extremity.    He is motivated to lose 50 pounds.  We suggested that if he loses 25 he could consider scheduling the opposite hip.    Return to clinic 3, weeks, patient should be weighed prior to being seen.  He should have a postoperative hip x-ray.  He will need to be referred to therapy at that time.    Vineet Bedolla MD                Again, thank you for allowing me to participate in the care of your patient.        Sincerely,        Vineet Bedolla MD

## 2018-02-27 NOTE — MR AVS SNAPSHOT
After Visit Summary   2/27/2018    Markie Rico    MRN: [id number]           Patient Information     Date Of Birth          1960        Visit Information        Provider Department      2/27/2018 1:20 PM Vineet Bedolla MD [facility]        Today's Diagnoses     Status post total hip replacement, right    -  1       Follow-ups after your visit        Your next 10 appointments already scheduled     Mar 20, 2018  1:10 PM CDT   Return Visit with Vineet Bedolla MD   [facility] ([facility]    [address] [place] [id number] [phone]              Who to contact     If you have questions or need follow up information about today's clinic visit or your schedule please contact [facility] directly at [phone].  Normal or non-critical lab and imaging results will be communicated to you by MyChart, letter or phone within 4 business days after the clinic has received the results. If you do not hear from us within 7 days, please contact the clinic through MyChart or phone. If you have a critical or abnormal lab result, we will notify you by phone as soon as possible.  Submit refill requests through [employer] or call your pharmacy and they will forward the refill request to us. Please allow 3 business days for your refill to be completed.          Additional Information About Your Visit        MyChart Information     [employer] gives you secure access to your electronic health record. If you see a primary care provider, you can also send messages to your care team and make appointments. If you have questions, please call your primary care clinic.  If you do not have a primary care provider, please call [phone] and they will assist you.        Care EveryWhere ID     This is your Care EveryWhere ID. This could be used by other organizations to access your [place] medical records  [id number]        Your Vitals Were  "    Temperature Height                98.1  F (36.7  C) (Temporal) 1.715 m (5' 7.5\")           Blood Pressure from Last 3 Encounters:   02/15/18 120/58   05/18/13 116/75   11/23/12 114/72    Weight from Last 3 Encounters:   02/05/18 129.7 kg (286 lb)   11/23/12 133.8 kg (295 lb)   05/15/12 120.2 kg (265 lb)              Today, you had the following     No orders found for display         Today's Medication Changes          These changes are accurate as of 2/27/18  3:00 PM.  If you have any questions, ask your nurse or doctor.               These medicines have changed or have updated prescriptions.        Dose/Directions    metoprolol succinate 50 MG 24 hr tablet   Commonly known as:  TOPROL XL   This may have changed:    - how much to take  - when to take this   Used for:  History of acute myocardial infarction of inferior wall, CAD (coronary artery disease)        Dose:  50 mg   Take 1 tablet by mouth daily.   Quantity:  90 tablet   Refills:  3                Primary Care Provider Office Phone # Fax #    Alfred Seth Abernathy [phone] [address] [place] [address]        Equal Access to Services     LUNA MARTINEZ AH: Hadii ivy larao Sochristoph, waaxda luqadaha, qaybta kaalmada adeegyada, lela root. So [facility] [phone].    ATENCIÓN: Si habla español, tiene a martin disposición servicios gratuitos de asistencia lingüística. [facility] [phone].    We comply with applicable federal civil rights laws and [place] laws. We do not discriminate on the basis of race, color, national origin, age, disability, sex, sexual orientation, or gender identity.            Thank you!     Thank you for choosing [facility]  for your care. Our goal is always to provide you with excellent care. Hearing back from our patients is one way we can continue to improve our services. Please take a few minutes to complete the written survey that you may receive in " the mail after your visit with us. Thank you!             Your Updated Medication List - Protect others around you: Learn how to safely use, store and throw away your medicines at www.disposemymeds.org.          This list is accurate as of 2/27/18  3:00 PM.  Always use your most recent med list.                   Brand Name Dispense Instructions for use Diagnosis    acetaminophen 325 MG tablet    TYLENOL    100 tablet    Take 2 tablets (650 mg) by mouth every 4 hours as needed for other (multimodal surgical pain management along with NSAIDS and opioid medication as indicated based on pain control and physical function.)    Status post total replacement of hip, unspecified laterality       aspirin - buffered 325 MG Tabs tablet    ASCRIPTIN    60 tablet    Take 1 tablet (325 mg) by mouth every 12 hours    Deep vein thrombosis (DVT) prophylaxis prescribed at discharge       atorvastatin 80 MG tablet    LIPITOR     Take 80 mg by mouth daily        CAYENNE PO           CENTRUM MEN PO      Take 1 tablet by mouth daily        Cinnamon Bark Powd           cyclobenzaprine 10 MG tablet    FLEXERIL    60 tablet    Take 1 tablet (10 mg) by mouth 3 times daily as needed for muscle spasms    Status post total replacement of right hip       gabapentin 300 MG capsule    NEURONTIN    30 capsule    Take 1 capsule (300 mg) by mouth daily    Pre-op exam       HYDROmorphone 2 MG tablet    DILAUDID    60 tablet    Take 1-2 tablets (2-4 mg) by mouth every 4 hours as needed for other (pain control or improvement in physical function. Hold dose for analgesic side effects.)    Status post total replacement of right hip       hydrOXYzine 25 MG tablet    ATARAX    60 tablet    Take 1 tablet (25 mg) by mouth every 6 hours as needed for itching or other (augment pain control)    Status post total replacement of right hip       lisinopril 10 MG tablet    PRINIVIL/ZESTRIL    90 tablet    Take 1 tablet by mouth daily. Take 10 mg by mouth daily.     History of acute myocardial infarction of inferior wall, CAD (coronary artery disease)       MAGNESIUM OXIDE PO      Take 400 mg by mouth daily        meloxicam 7.5 MG tablet    MOBIC    30 tablet    Take 1 tablet (7.5 mg) by mouth daily    Pre-op exam       metoprolol succinate 50 MG 24 hr tablet    TOPROL XL    90 tablet    Take 1 tablet by mouth daily.    History of acute myocardial infarction of inferior wall, CAD (coronary artery disease)       NIACIN (ANTIHYPERLIPIDEMIC) PO      Take 500 mg by mouth At Bedtime        nitroGLYcerin 0.4 MG sublingual tablet    NITROSTAT    15 tablet    Place 1 tablet under the tongue every 5 minutes as needed.    History of acute myocardial infarction of inferior wall, CAD (coronary artery disease)       order for DME     1 Device    Equipment being ordered: Walker () Treatment Diagnosis: s/p joint replacement    Status post total replacement of right hip       POTASSIUM CHLORIDE CR PO      Take 10 mEq by mouth daily.        senna-docusate 8.6-50 MG per tablet    SENOKOT-S;PERICOLACE    100 tablet    Take 1 tablet by mouth 2 times daily as needed for constipation    Status post total replacement of right hip       TURMERIC PO           Vitamin D (Cholecalciferol) 400 UNITS Tabs      Take 1 tablet by mouth daily

## 2018-02-27 NOTE — NURSING NOTE
"Chief Complaint   Patient presents with     RECHECK     Right total hip arthroplasty.  DOS: 2/12/18 (14 days postop)     Surgical Followup     POSTOPERATIVE DIAGNOSIS:  Degenerative arthritis, right hip. SECONDARY DIAGNOSES:  Morbid obesity. PROCEDURE:  Right total hip arthroplasty through a posterior approach utilizing [place] 60 mm in diameter Tritanium cluster cup with a single fixation screw, 6 Accolade 2 femoral stem press-fit, +0 Biolox femoral head, 36 mm in diameter  X3 polyethylene liner.       Initial Temp 98.1  F (36.7  C) (Temporal)  Ht 1.715 m (5' 7.5\") Estimated body mass index is 44.13 kg/(m^2) as calculated from the following:    Height as of 2/5/18: 1.715 m (5' 7.5\").    Weight as of 2/5/18: 129.7 kg (286 lb).  Medication Reconciliation: complete   YULIYA Hutchins  "

## 2018-03-20 ENCOUNTER — RADIANT APPOINTMENT (OUTPATIENT)
Dept: GENERAL RADIOLOGY | Facility: CLINIC | Age: 58
End: 2018-03-20
Attending: ORTHOPAEDIC SURGERY
Payer: COMMERCIAL

## 2018-03-20 ENCOUNTER — OFFICE VISIT (OUTPATIENT)
Dept: ORTHOPEDICS | Facility: CLINIC | Age: 58
End: 2018-03-20
Payer: COMMERCIAL

## 2018-03-20 VITALS — WEIGHT: 285.5 LBS | BODY MASS INDEX: 44.06 KG/M2 | TEMPERATURE: 97.5 F

## 2018-03-20 DIAGNOSIS — Z96.641 STATUS POST TOTAL REPLACEMENT OF RIGHT HIP: Primary | ICD-10-CM

## 2018-03-20 DIAGNOSIS — Z96.641 STATUS POST TOTAL REPLACEMENT OF RIGHT HIP: ICD-10-CM

## 2018-03-20 PROCEDURE — 99024 POSTOP FOLLOW-UP VISIT: CPT | Performed by: ORTHOPAEDIC SURGERY

## 2018-03-20 PROCEDURE — 73502 X-RAY EXAM HIP UNI 2-3 VIEWS: CPT | Mod: TC

## 2018-03-20 ASSESSMENT — PAIN SCALES - GENERAL: PAINLEVEL: NO PAIN (0)

## 2018-03-20 NOTE — LETTER
3/20/2018         RE: Markie Rico  [address] [facility] [address]        Dear Colleague,    Thank you for referring your patient, Markie Rico, to the [facility]. Please see a copy of my visit note below.    HISTORY OF PRESENT ILLNESS:    Markie Rico is a 57 year old male who is seen in follow up for   Chief Complaint   Patient presents with     RECHECK     Right total hip arthroplasty.  DOS: 2/12/18 (5 weeks postop)     Surgical Followup     POSTOPERATIVE DIAGNOSIS:  Degenerative arthritis, right hip. SECONDARY DIAGNOSES:  Morbid obesity. PROCEDURE:  Right total hip arthroplasty through a posterior approach utilizing [place] 60 mm in diameter Tritanium cluster cup with a single fixation screw, 6 Accolade 2 femoral stem press-fit, +0 Biolox femoral head, 36 mm in diameter  X3 polyethylene liner.     Treatments tried to this point: Home exercise program.  He continues to use 2 canes because of lack of stability related to his other hip.  Present symptoms: He has no complaints concerning his right hip replacement.      PHYSICAL EXAM:  Temp 97.5  F (36.4  C) (Temporal)  Wt 129.5 kg (285 lb 8 oz)  BMI 44.06 kg/m2  Body mass index is 44.06 kg/(m^2).       Physical Exam:  Vitals: Temp 97.5  F (36.4  C) (Temporal)  Wt 129.5 kg (285 lb 8 oz)  BMI 44.06 kg/m2  BMI= Body mass index is 44.06 kg/(m^2).  Constitutional: healthy, alert and no acute distress   Psychiatric: mentation appears normal and affect normal/bright    JOINT/EXTREMITIES:  NEURO: no focal deficits  Affected extremity pulses are easily palpable.  SKIN: no excoriation or erythema. No signs of infection. Surgical wound is healthy.        GAIT: The stance time on his right leg has improved.        Swelling: No significant swelling.      ROM: No irritability with hip range of motion.    Strength: Not tested.      IMAGING INTERPRETATION: X-rays show concentrically reduced hip joint.  Good positioning of components.  No signs of  soft tissue calcification.     ASSESSMENT:    ICD-10-CM    1. Status post total replacement of right hip Z96.641 XR Hip Right 2-3 Views     PHYSICAL THERAPY REFERRAL       Seems to be doing well with respect to his right hip.  He has not lost any weight since last visit.  His goal is to lose 50 pounds before we do his left hip.    PLAN:      Edema control reviewed.    Pain medication usage reviewed.  He did not request a refill.    Physical Therapy/Activity: I would like to refer him to physical therapy for right hip flexibility and strengthening.    Return to Work: He has already returned to work.    We emphasized today his need for weight loss.    Return to clinic 6, weeks, once again should be weighed prior to being seen.    Vineet Bedolla MD              Again, thank you for allowing me to participate in the care of your patient.        Sincerely,        Vineet Bedolla MD

## 2018-03-20 NOTE — NURSING NOTE
"Chief Complaint   Patient presents with     RECHECK     Right total hip arthroplasty.  DOS: 2/12/18 (5 weeks postop)     Surgical Followup     POSTOPERATIVE DIAGNOSIS:  Degenerative arthritis, right hip. SECONDARY DIAGNOSES:  Morbid obesity. PROCEDURE:  Right total hip arthroplasty through a posterior approach utilizing [place] 60 mm in diameter Tritanium cluster cup with a single fixation screw, 6 Accolade 2 femoral stem press-fit, +0 Biolox femoral head, 36 mm in diameter  X3 polyethylene liner.       Initial Temp 97.5  F (36.4  C) (Temporal)  Wt 129.5 kg (285 lb 8 oz)  BMI 44.06 kg/m2 Estimated body mass index is 44.06 kg/(m^2) as calculated from the following:    Height as of 2/27/18: 1.715 m (5' 7.5\").    Weight as of this encounter: 129.5 kg (285 lb 8 oz).  Medication Reconciliation: complete   YULIYA Hutchins  "

## 2018-03-20 NOTE — MR AVS SNAPSHOT
"              After Visit Summary   3/20/2018    Markie Rico    MRN: [id number]           Patient Information     Date Of Birth          1960        Visit Information        Provider Department      3/20/2018 1:10 PM Vineet Bedolla MD [facility]        Today's Diagnoses     Status post total replacement of right hip    -  1       Follow-ups after your visit        Additional Services     PHYSICAL THERAPY REFERRAL       *This therapy referral will be filtered to a centralized scheduling office at [facility] and the patient will receive a call to schedule an appointment at a [facility] location most convenient for them. *     [facility] provides Physical Therapy evaluation and treatment and many specialty services across the [facility] system.  If requesting a specialty program, please choose from the list below.    If you have not heard from the scheduling office within 2 business days, please call [phone] for all locations, with the exception of [place], please call [phone] and [facility], please call [phone]  Treatment: Evaluation & Treatment  Special Instructions/Modalities: PRN  Special Programs: Post op posterior approach rehab program.  Flexibility and PRE's    Please be aware that coverage of these services is subject to the terms and limitations of your health insurance plan.  Call member services at your health plan with any benefit or coverage questions.      **Note to Provider:  If you are referring outside of [facility] for the therapy appointment, please list the name of the location in the \"special instructions\" above, print the referral and give to the patient to schedule the appointment.                  Your next 10 appointments already scheduled     May 01, 2018  1:30 PM CDT   Return Visit with Vineet Bedolla MD   [facility] ([facility])    [address] [place] [address] " [phone]              Who to contact     If you have questions or need follow up information about today's clinic visit or your schedule please contact [facility] directly at [phone].  Normal or non-critical lab and imaging results will be communicated to you by Knohart, letter or phone within 4 business days after the clinic has received the results. If you do not hear from us within 7 days, please contact the clinic through Knohart or phone. If you have a critical or abnormal lab result, we will notify you by phone as soon as possible.  Submit refill requests through Fewzion or call your pharmacy and they will forward the refill request to us. Please allow 3 business days for your refill to be completed.          Additional Information About Your Visit        Knohart Information     Fewzion gives you secure access to your electronic health record. If you see a primary care provider, you can also send messages to your care team and make appointments. If you have questions, please call your primary care clinic.  If you do not have a primary care provider, please call [phone] and they will assist you.        Care EveryWhere ID     This is your Care EveryWhere ID. This could be used by other organizations to access your [facility] medical records  [id number]        Your Vitals Were     Temperature BMI (Body Mass Index)                97.5  F (36.4  C) (Temporal) 44.06 kg/m2           Blood Pressure from Last 3 Encounters:   02/15/18 120/58   05/18/13 116/75   11/23/12 114/72    Weight from Last 3 Encounters:   03/20/18 129.5 kg (285 lb 8 oz)   02/05/18 129.7 kg (286 lb)   11/23/12 133.8 kg (295 lb)              We Performed the Following     PHYSICAL THERAPY REFERRAL          Today's Medication Changes          These changes are accurate as of 3/20/18  4:01 PM.  If you have any questions, ask your nurse or doctor.               These medicines have changed or have updated  prescriptions.        Dose/Directions    metoprolol succinate 50 MG 24 hr tablet   Commonly known as:  TOPROL XL   This may have changed:    - how much to take  - when to take this   Used for:  History of acute myocardial infarction of inferior wall, CAD (coronary artery disease)        Dose:  50 mg   Take 1 tablet by mouth daily.   Quantity:  90 tablet   Refills:  3                Primary Care Provider Office Phone # Fax #    Alfred Seth Abernathy [phone]       [address] [place] [address]        Equal Access to Services     ABRAHAM MARTINEZ : Hadii aad ku hadasho Soomaali, waaxda luqadaha, qaybta kaalmada adeegyada, waxay jodiein haycody guillen . So [facility] [phone].    ATENCIÓN: Si habla español, tiene a martin disposición servicios gratuitos de asistencia lingüística. [facility] [phone].    We comply with applicable federal civil rights laws and [place] laws. We do not discriminate on the basis of race, color, national origin, age, disability, sex, sexual orientation, or gender identity.            Thank you!     Thank you for choosing [facility]  for your care. Our goal is always to provide you with excellent care. Hearing back from our patients is one way we can continue to improve our services. Please take a few minutes to complete the written survey that you may receive in the mail after your visit with us. Thank you!             Your Updated Medication List - Protect others around you: Learn how to safely use, store and throw away your medicines at www.disposemymeds.org.          This list is accurate as of 3/20/18  4:01 PM.  Always use your most recent med list.                   Brand Name Dispense Instructions for use Diagnosis    acetaminophen 325 MG tablet    TYLENOL    100 tablet    Take 2 tablets (650 mg) by mouth every 4 hours as needed for other (multimodal surgical pain management along with NSAIDS and opioid medication as indicated based on  pain control and physical function.)    Status post total replacement of hip, unspecified laterality       atorvastatin 80 MG tablet    LIPITOR     Take 80 mg by mouth daily        CAYENNE PO           CENTRUM MEN PO      Take 1 tablet by mouth daily        Cinnamon Bark Powd           lisinopril 10 MG tablet    PRINIVIL/ZESTRIL    90 tablet    Take 1 tablet by mouth daily. Take 10 mg by mouth daily.    History of acute myocardial infarction of inferior wall, CAD (coronary artery disease)       MAGNESIUM OXIDE PO      Take 400 mg by mouth daily        metoprolol succinate 50 MG 24 hr tablet    TOPROL XL    90 tablet    Take 1 tablet by mouth daily.    History of acute myocardial infarction of inferior wall, CAD (coronary artery disease)       NIACIN (ANTIHYPERLIPIDEMIC) PO      Take 500 mg by mouth At Bedtime        nitroGLYcerin 0.4 MG sublingual tablet    NITROSTAT    15 tablet    Place 1 tablet under the tongue every 5 minutes as needed.    History of acute myocardial infarction of inferior wall, CAD (coronary artery disease)       order for DME     1 Device    Equipment being ordered: Walker () Treatment Diagnosis: s/p joint replacement    Status post total replacement of right hip       POTASSIUM CHLORIDE CR PO      Take 10 mEq by mouth daily.        senna-docusate 8.6-50 MG per tablet    SENOKOT-S;PERICOLACE    100 tablet    Take 1 tablet by mouth 2 times daily as needed for constipation    Status post total replacement of right hip       TURMERIC PO           Vitamin D (Cholecalciferol) 400 UNITS Tabs      Take 1 tablet by mouth daily

## 2018-03-20 NOTE — PROGRESS NOTES
HISTORY OF PRESENT ILLNESS:    Markie Rico is a 57 year old male who is seen in follow up for   Chief Complaint   Patient presents with     RECHECK     Right total hip arthroplasty.  DOS: 2/12/18 (5 weeks postop)     Surgical Followup     POSTOPERATIVE DIAGNOSIS:  Degenerative arthritis, right hip. SECONDARY DIAGNOSES:  Morbid obesity. PROCEDURE:  Right total hip arthroplasty through a posterior approach utilizing [place] 60 mm in diameter Tritanium cluster cup with a single fixation screw, 6 Accolade 2 femoral stem press-fit, +0 Biolox femoral head, 36 mm in diameter  X3 polyethylene liner.     Treatments tried to this point: Home exercise program.  He continues to use 2 canes because of lack of stability related to his other hip.  Present symptoms: He has no complaints concerning his right hip replacement.      PHYSICAL EXAM:  Temp 97.5  F (36.4  C) (Temporal)  Wt 129.5 kg (285 lb 8 oz)  BMI 44.06 kg/m2  Body mass index is 44.06 kg/(m^2).       Physical Exam:  Vitals: Temp 97.5  F (36.4  C) (Temporal)  Wt 129.5 kg (285 lb 8 oz)  BMI 44.06 kg/m2  BMI= Body mass index is 44.06 kg/(m^2).  Constitutional: healthy, alert and no acute distress   Psychiatric: mentation appears normal and affect normal/bright    JOINT/EXTREMITIES:  NEURO: no focal deficits  Affected extremity pulses are easily palpable.  SKIN: no excoriation or erythema. No signs of infection. Surgical wound is healthy.        GAIT: The stance time on his right leg has improved.        Swelling: No significant swelling.      ROM: No irritability with hip range of motion.    Strength: Not tested.      IMAGING INTERPRETATION: X-rays show concentrically reduced hip joint.  Good positioning of components.  No signs of soft tissue calcification.     ASSESSMENT:    ICD-10-CM    1. Status post total replacement of right hip Z96.641 XR Hip Right 2-3 Views     PHYSICAL THERAPY REFERRAL       Seems to be doing well with respect to his right hip.  He has not  lost any weight since last visit.  His goal is to lose 50 pounds before we do his left hip.    PLAN:      Edema control reviewed.    Pain medication usage reviewed.  He did not request a refill.    Physical Therapy/Activity: I would like to refer him to physical therapy for right hip flexibility and strengthening.    Return to Work: He has already returned to work.    We emphasized today his need for weight loss.    Return to clinic 6, weeks, once again should be weighed prior to being seen.    Vineet Bedolla MD

## 2018-04-14 ENCOUNTER — TRANSFERRED RECORDS (OUTPATIENT)
Dept: HEALTH INFORMATION MANAGEMENT | Facility: CLINIC | Age: 58
End: 2018-04-14

## 2018-04-14 ENCOUNTER — APPOINTMENT (OUTPATIENT)
Dept: CT IMAGING | Facility: CLINIC | Age: 58
End: 2018-04-14
Attending: NURSE PRACTITIONER
Payer: COMMERCIAL

## 2018-04-14 ENCOUNTER — HOSPITAL ENCOUNTER (EMERGENCY)
Facility: CLINIC | Age: 58
Discharge: ANOTHER HOSPITAL | End: 2018-04-14
Attending: NURSE PRACTITIONER | Admitting: NURSE PRACTITIONER
Payer: COMMERCIAL

## 2018-04-14 VITALS
OXYGEN SATURATION: 100 % | RESPIRATION RATE: 16 BRPM | SYSTOLIC BLOOD PRESSURE: 117 MMHG | HEART RATE: 72 BPM | BODY MASS INDEX: 44.05 KG/M2 | WEIGHT: 285.5 LBS | TEMPERATURE: 98.6 F | DIASTOLIC BLOOD PRESSURE: 89 MMHG

## 2018-04-14 DIAGNOSIS — Z96.641 STATUS POST TOTAL REPLACEMENT OF RIGHT HIP: ICD-10-CM

## 2018-04-14 DIAGNOSIS — I25.2 HISTORY OF ACUTE MYOCARDIAL INFARCTION OF INFERIOR WALL: ICD-10-CM

## 2018-04-14 DIAGNOSIS — E78.5 HYPERLIPIDEMIA LDL GOAL <100: ICD-10-CM

## 2018-04-14 DIAGNOSIS — I10 BENIGN ESSENTIAL HYPERTENSION: ICD-10-CM

## 2018-04-14 DIAGNOSIS — I21.4 NSTEMI (NON-ST ELEVATED MYOCARDIAL INFARCTION) (H): Primary | ICD-10-CM

## 2018-04-14 LAB
ALBUMIN SERPL-MCNC: 3.7 G/DL (ref 3.4–5)
ALP SERPL-CCNC: 121 U/L (ref 40–150)
ALT SERPL W P-5'-P-CCNC: 35 U/L (ref 0–70)
ANION GAP SERPL CALCULATED.3IONS-SCNC: 9 MMOL/L (ref 3–14)
APTT PPP: 26 SEC (ref 22–37)
AST SERPL W P-5'-P-CCNC: 148 U/L (ref 0–45)
BASE EXCESS BLDV CALC-SCNC: 3.3 MMOL/L
BASOPHILS # BLD AUTO: 0 10E9/L (ref 0–0.2)
BASOPHILS NFR BLD AUTO: 0.2 %
BILIRUB SERPL-MCNC: 0.3 MG/DL (ref 0.2–1.3)
BUN SERPL-MCNC: 10 MG/DL (ref 7–30)
CALCIUM SERPL-MCNC: 8.8 MG/DL (ref 8.5–10.1)
CHLORIDE SERPL-SCNC: 107 MMOL/L (ref 94–109)
CO2 SERPL-SCNC: 25 MMOL/L (ref 20–32)
CREAT SERPL-MCNC: 0.43 MG/DL (ref 0.66–1.25)
D DIMER PPP FEU-MCNC: 0.8 UG/ML FEU (ref 0–0.5)
DIFFERENTIAL METHOD BLD: ABNORMAL
EOSINOPHIL # BLD AUTO: 0 10E9/L (ref 0–0.7)
EOSINOPHIL NFR BLD AUTO: 0.2 %
ERYTHROCYTE [DISTWIDTH] IN BLOOD BY AUTOMATED COUNT: 13.7 % (ref 10–15)
GFR SERPL CREATININE-BSD FRML MDRD: >90 ML/MIN/1.7M2
GLUCOSE SERPL-MCNC: 118 MG/DL (ref 70–99)
HCO3 BLDV-SCNC: 26 MMOL/L (ref 21–28)
HCT VFR BLD AUTO: 40.8 % (ref 40–53)
HGB BLD-MCNC: 13.4 G/DL (ref 13.3–17.7)
IMM GRANULOCYTES # BLD: 0 10E9/L (ref 0–0.4)
IMM GRANULOCYTES NFR BLD: 0.3 %
INR PPP: 0.97 (ref 0.86–1.14)
LYMPHOCYTES # BLD AUTO: 1.5 10E9/L (ref 0.8–5.3)
LYMPHOCYTES NFR BLD AUTO: 11.8 %
MAGNESIUM SERPL-MCNC: 2.4 MG/DL (ref 1.6–2.3)
MCH RBC QN AUTO: 28.8 PG (ref 26.5–33)
MCHC RBC AUTO-ENTMCNC: 32.8 G/DL (ref 31.5–36.5)
MCV RBC AUTO: 88 FL (ref 78–100)
MONOCYTES # BLD AUTO: 0.7 10E9/L (ref 0–1.3)
MONOCYTES NFR BLD AUTO: 5.3 %
NEUTROPHILS # BLD AUTO: 10.5 10E9/L (ref 1.6–8.3)
NEUTROPHILS NFR BLD AUTO: 82.2 %
NT-PROBNP SERPL-MCNC: 1939 PG/ML (ref 0–900)
O2/TOTAL GAS SETTING VFR VENT: 21 %
PCO2 BLDV: 34 MM HG (ref 40–50)
PH BLDV: 7.5 PH (ref 7.32–7.43)
PLATELET # BLD AUTO: 262 10E9/L (ref 150–450)
PO2 BLDV: 61 MM HG (ref 25–47)
POTASSIUM SERPL-SCNC: 3.7 MMOL/L (ref 3.4–5.3)
PROT SERPL-MCNC: 7.3 G/DL (ref 6.8–8.8)
RBC # BLD AUTO: 4.66 10E12/L (ref 4.4–5.9)
SODIUM SERPL-SCNC: 141 MMOL/L (ref 133–144)
TROPONIN I SERPL-MCNC: 16.52 UG/L (ref 0–0.04)
TROPONIN I SERPL-MCNC: 17.02 UG/L (ref 0–0.04)
WBC # BLD AUTO: 12.8 10E9/L (ref 4–11)

## 2018-04-14 PROCEDURE — 71260 CT THORAX DX C+: CPT

## 2018-04-14 PROCEDURE — 85379 FIBRIN DEGRADATION QUANT: CPT | Performed by: NURSE PRACTITIONER

## 2018-04-14 PROCEDURE — 93010 ELECTROCARDIOGRAM REPORT: CPT | Mod: Z6 | Performed by: EMERGENCY MEDICINE

## 2018-04-14 PROCEDURE — 25000125 ZZHC RX 250: Performed by: NURSE PRACTITIONER

## 2018-04-14 PROCEDURE — 99291 CRITICAL CARE FIRST HOUR: CPT | Mod: 25 | Performed by: EMERGENCY MEDICINE

## 2018-04-14 PROCEDURE — 25000128 H RX IP 250 OP 636: Performed by: EMERGENCY MEDICINE

## 2018-04-14 PROCEDURE — 83880 ASSAY OF NATRIURETIC PEPTIDE: CPT | Performed by: NURSE PRACTITIONER

## 2018-04-14 PROCEDURE — 96375 TX/PRO/DX INJ NEW DRUG ADDON: CPT | Performed by: EMERGENCY MEDICINE

## 2018-04-14 PROCEDURE — 85730 THROMBOPLASTIN TIME PARTIAL: CPT | Performed by: NURSE PRACTITIONER

## 2018-04-14 PROCEDURE — 25000132 ZZH RX MED GY IP 250 OP 250 PS 637: Performed by: NURSE PRACTITIONER

## 2018-04-14 PROCEDURE — 96365 THER/PROPH/DIAG IV INF INIT: CPT | Mod: 59 | Performed by: EMERGENCY MEDICINE

## 2018-04-14 PROCEDURE — 96376 TX/PRO/DX INJ SAME DRUG ADON: CPT | Performed by: EMERGENCY MEDICINE

## 2018-04-14 PROCEDURE — 99292 CRITICAL CARE ADDL 30 MIN: CPT | Mod: 25 | Performed by: EMERGENCY MEDICINE

## 2018-04-14 PROCEDURE — 84484 ASSAY OF TROPONIN QUANT: CPT | Performed by: NURSE PRACTITIONER

## 2018-04-14 PROCEDURE — 25000128 H RX IP 250 OP 636: Performed by: NURSE PRACTITIONER

## 2018-04-14 PROCEDURE — 93010 ELECTROCARDIOGRAM REPORT: CPT | Mod: 76 | Performed by: EMERGENCY MEDICINE

## 2018-04-14 PROCEDURE — 85610 PROTHROMBIN TIME: CPT | Performed by: NURSE PRACTITIONER

## 2018-04-14 PROCEDURE — 82803 BLOOD GASES ANY COMBINATION: CPT | Performed by: NURSE PRACTITIONER

## 2018-04-14 PROCEDURE — 96366 THER/PROPH/DIAG IV INF ADDON: CPT | Performed by: EMERGENCY MEDICINE

## 2018-04-14 PROCEDURE — 93005 ELECTROCARDIOGRAM TRACING: CPT | Performed by: EMERGENCY MEDICINE

## 2018-04-14 PROCEDURE — 83735 ASSAY OF MAGNESIUM: CPT | Performed by: NURSE PRACTITIONER

## 2018-04-14 PROCEDURE — 85025 COMPLETE CBC W/AUTO DIFF WBC: CPT | Performed by: NURSE PRACTITIONER

## 2018-04-14 PROCEDURE — 96368 THER/DIAG CONCURRENT INF: CPT | Performed by: EMERGENCY MEDICINE

## 2018-04-14 PROCEDURE — 80053 COMPREHEN METABOLIC PANEL: CPT | Performed by: NURSE PRACTITIONER

## 2018-04-14 PROCEDURE — 93005 ELECTROCARDIOGRAM TRACING: CPT | Mod: 76 | Performed by: EMERGENCY MEDICINE

## 2018-04-14 PROCEDURE — 36415 COLL VENOUS BLD VENIPUNCTURE: CPT | Performed by: NURSE PRACTITIONER

## 2018-04-14 RX ORDER — LIDOCAINE 40 MG/G
CREAM TOPICAL
Status: CANCELLED | OUTPATIENT
Start: 2018-04-14

## 2018-04-14 RX ORDER — NITROGLYCERIN 20 MG/100ML
.07-1.54 INJECTION INTRAVENOUS CONTINUOUS
Status: DISCONTINUED | OUTPATIENT
Start: 2018-04-14 | End: 2018-04-14 | Stop reason: HOSPADM

## 2018-04-14 RX ORDER — ONDANSETRON 2 MG/ML
4 INJECTION INTRAMUSCULAR; INTRAVENOUS EVERY 6 HOURS PRN
Status: CANCELLED | OUTPATIENT
Start: 2018-04-14

## 2018-04-14 RX ORDER — HEPARIN SODIUM 10000 [USP'U]/100ML
0-3500 INJECTION, SOLUTION INTRAVENOUS CONTINUOUS
Status: DISCONTINUED | OUTPATIENT
Start: 2018-04-14 | End: 2018-04-14 | Stop reason: HOSPADM

## 2018-04-14 RX ORDER — MORPHINE SULFATE 2 MG/ML
2-4 INJECTION, SOLUTION INTRAMUSCULAR; INTRAVENOUS
Status: CANCELLED | OUTPATIENT
Start: 2018-04-14

## 2018-04-14 RX ORDER — LORAZEPAM 2 MG/ML
0.5 INJECTION INTRAMUSCULAR EVERY 4 HOURS PRN
Status: CANCELLED | OUTPATIENT
Start: 2018-04-14

## 2018-04-14 RX ORDER — LORAZEPAM 2 MG/ML
0.5 INJECTION INTRAMUSCULAR ONCE
Status: COMPLETED | OUTPATIENT
Start: 2018-04-14 | End: 2018-04-14

## 2018-04-14 RX ORDER — ASPIRIN 81 MG/1
324 TABLET, CHEWABLE ORAL ONCE
Status: COMPLETED | OUTPATIENT
Start: 2018-04-14 | End: 2018-04-14

## 2018-04-14 RX ORDER — MORPHINE SULFATE 4 MG/ML
4 INJECTION, SOLUTION INTRAMUSCULAR; INTRAVENOUS ONCE
Status: COMPLETED | OUTPATIENT
Start: 2018-04-14 | End: 2018-04-14

## 2018-04-14 RX ORDER — KETOROLAC TROMETHAMINE 30 MG/ML
30 INJECTION, SOLUTION INTRAMUSCULAR; INTRAVENOUS ONCE
Status: COMPLETED | OUTPATIENT
Start: 2018-04-14 | End: 2018-04-14

## 2018-04-14 RX ORDER — MORPHINE SULFATE 4 MG/ML
4 INJECTION, SOLUTION INTRAMUSCULAR; INTRAVENOUS
Status: DISCONTINUED | OUTPATIENT
Start: 2018-04-14 | End: 2018-04-14 | Stop reason: HOSPADM

## 2018-04-14 RX ORDER — NALOXONE HYDROCHLORIDE 0.4 MG/ML
.1-.4 INJECTION, SOLUTION INTRAMUSCULAR; INTRAVENOUS; SUBCUTANEOUS
Status: CANCELLED | OUTPATIENT
Start: 2018-04-14

## 2018-04-14 RX ORDER — SODIUM CHLORIDE 9 MG/ML
INJECTION, SOLUTION INTRAVENOUS CONTINUOUS
Status: CANCELLED | OUTPATIENT
Start: 2018-04-14

## 2018-04-14 RX ORDER — SODIUM CHLORIDE 9 MG/ML
INJECTION, SOLUTION INTRAVENOUS CONTINUOUS
Status: DISCONTINUED | OUTPATIENT
Start: 2018-04-14 | End: 2018-04-14 | Stop reason: HOSPADM

## 2018-04-14 RX ORDER — NITROGLYCERIN 20 MG/100ML
.07-4 INJECTION INTRAVENOUS CONTINUOUS
Status: DISCONTINUED | OUTPATIENT
Start: 2018-04-14 | End: 2018-04-14 | Stop reason: HOSPADM

## 2018-04-14 RX ORDER — IOPAMIDOL 755 MG/ML
100 INJECTION, SOLUTION INTRAVASCULAR ONCE
Status: COMPLETED | OUTPATIENT
Start: 2018-04-14 | End: 2018-04-14

## 2018-04-14 RX ORDER — ONDANSETRON 4 MG/1
4 TABLET, ORALLY DISINTEGRATING ORAL EVERY 6 HOURS PRN
Status: CANCELLED | OUTPATIENT
Start: 2018-04-14

## 2018-04-14 RX ORDER — LORAZEPAM 0.5 MG/1
0.5 TABLET ORAL EVERY 4 HOURS PRN
Status: CANCELLED | OUTPATIENT
Start: 2018-04-14

## 2018-04-14 RX ORDER — MORPHINE SULFATE 2 MG/ML
2 INJECTION, SOLUTION INTRAMUSCULAR; INTRAVENOUS EVERY 10 MIN PRN
Status: DISCONTINUED | OUTPATIENT
Start: 2018-04-14 | End: 2018-04-14 | Stop reason: HOSPADM

## 2018-04-14 RX ORDER — ONDANSETRON 2 MG/ML
4 INJECTION INTRAMUSCULAR; INTRAVENOUS
Status: COMPLETED | OUTPATIENT
Start: 2018-04-14 | End: 2018-04-14

## 2018-04-14 RX ADMIN — ONDANSETRON 4 MG: 2 INJECTION INTRAMUSCULAR; INTRAVENOUS at 13:25

## 2018-04-14 RX ADMIN — LORAZEPAM 0.5 MG: 2 INJECTION INTRAMUSCULAR; INTRAVENOUS at 18:15

## 2018-04-14 RX ADMIN — ASPIRIN 81 MG 324 MG: 81 TABLET ORAL at 13:19

## 2018-04-14 RX ADMIN — NITROGLYCERIN 0.1 MCG/KG/MIN: 20 INJECTION INTRAVENOUS at 13:12

## 2018-04-14 RX ADMIN — SODIUM CHLORIDE, PRESERVATIVE FREE: 5 INJECTION INTRAVENOUS at 14:49

## 2018-04-14 RX ADMIN — MORPHINE SULFATE 2 MG: 2 INJECTION, SOLUTION INTRAMUSCULAR; INTRAVENOUS at 15:52

## 2018-04-14 RX ADMIN — KETOROLAC TROMETHAMINE 30 MG: 30 INJECTION, SOLUTION INTRAMUSCULAR at 14:50

## 2018-04-14 RX ADMIN — SODIUM CHLORIDE 60 ML: 9 INJECTION, SOLUTION INTRAVENOUS at 13:57

## 2018-04-14 RX ADMIN — SODIUM CHLORIDE, PRESERVATIVE FREE: 5 INJECTION INTRAVENOUS at 14:51

## 2018-04-14 RX ADMIN — MORPHINE SULFATE 4 MG: 2 INJECTION, SOLUTION INTRAMUSCULAR; INTRAVENOUS at 18:19

## 2018-04-14 RX ADMIN — MORPHINE SULFATE 2 MG: 2 INJECTION, SOLUTION INTRAMUSCULAR; INTRAVENOUS at 13:18

## 2018-04-14 RX ADMIN — MORPHINE SULFATE 4 MG: 4 INJECTION, SOLUTION INTRAMUSCULAR; INTRAVENOUS at 13:48

## 2018-04-14 RX ADMIN — NITROGLYCERIN 1.64 MCG/KG/MIN: 20 INJECTION INTRAVENOUS at 18:07

## 2018-04-14 RX ADMIN — TICAGRELOR 180 MG: 90 TABLET ORAL at 15:50

## 2018-04-14 RX ADMIN — HEPARIN SODIUM 1200 UNITS/HR: 10000 INJECTION, SOLUTION INTRAVENOUS at 15:49

## 2018-04-14 RX ADMIN — LORAZEPAM 0.5 MG: 2 INJECTION INTRAMUSCULAR; INTRAVENOUS at 16:17

## 2018-04-14 RX ADMIN — IOPAMIDOL 75 ML: 755 INJECTION, SOLUTION INTRAVENOUS at 13:57

## 2018-04-14 ASSESSMENT — ENCOUNTER SYMPTOMS
ACTIVITY CHANGE: 1
BACK PAIN: 1
NAUSEA: 1

## 2018-04-14 NOTE — ED NOTES
6/10/2011  [facility] [place] Results    Deepthi Casper   Nurse Practitioner - Adult Health    Progress Notes      FINAL  PRIMARY CARE PHYSICIAN:  Alfred Abernathy MD   ADMITTING CARDIOLOGIST:  Chencho Inman MD   INTERVENTIONAL CARDIOLOGIST:  Irineo Brady MD   DISCHARGE CARDIOLOGIST:  René Rodriguez MD      DISCHARGE DIAGNOSIS:   1.  Non-Q-wave myocardial infarction.   2.  Status post angioplasty stenting of the mid RCA and proximal LAD arteries with drug-eluting stents.    3.  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   4.  Hyperlipidemia with .   5.  History of diverticulitis.   6.  History of partial colectomy.   7.  Former smoker, quit 11 years ago.
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
## 2018-04-14 NOTE — ED NOTES
Pt a/o x 3.  [place]>  States initial stents placed in 2000 and has not seen cardiology since 2011.  Nitro infusion running.  MS given.
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
## 2018-04-17 ENCOUNTER — TRANSFERRED RECORDS (OUTPATIENT)
Dept: HEALTH INFORMATION MANAGEMENT | Facility: CLINIC | Age: 58
End: 2018-04-17

## 2018-09-26 ENCOUNTER — HOSPITAL ENCOUNTER (EMERGENCY)
Facility: CLINIC | Age: 58
Discharge: HOME | End: 2018-09-26
Attending: EMERGENCY MEDICINE | Admitting: EMERGENCY MEDICINE
Payer: COMMERCIAL

## 2018-09-26 VITALS
BODY MASS INDEX: 44.75 KG/M2 | SYSTOLIC BLOOD PRESSURE: 149 MMHG | RESPIRATION RATE: 20 BRPM | TEMPERATURE: 98.3 F | HEART RATE: 86 BPM | WEIGHT: 290 LBS | OXYGEN SATURATION: 95 % | DIASTOLIC BLOOD PRESSURE: 97 MMHG

## 2018-09-26 DIAGNOSIS — K08.89 PAIN, DENTAL: ICD-10-CM

## 2018-09-26 PROCEDURE — 99282 EMERGENCY DEPT VISIT SF MDM: CPT | Performed by: EMERGENCY MEDICINE

## 2018-09-26 PROCEDURE — 99283 EMERGENCY DEPT VISIT LOW MDM: CPT | Mod: Z6 | Performed by: EMERGENCY MEDICINE

## 2018-09-26 RX ORDER — HYDROCODONE BITARTRATE AND ACETAMINOPHEN 5; 325 MG/1; MG/1
1-2 TABLET ORAL EVERY 6 HOURS PRN
Qty: 12 TABLET | Refills: 0 | Status: SHIPPED | OUTPATIENT
Start: 2018-09-26 | End: 2019-06-20

## 2018-09-26 RX ORDER — ROSUVASTATIN CALCIUM 40 MG/1
40 TABLET, COATED ORAL
Status: ON HOLD | COMMUNITY
Start: 2018-04-17 | End: 2022-09-12

## 2018-09-26 RX ORDER — AMOXICILLIN 875 MG
875 TABLET ORAL 2 TIMES DAILY
Qty: 20 TABLET | Refills: 0 | Status: SHIPPED | OUTPATIENT
Start: 2018-09-26 | End: 2018-10-06

## 2018-09-26 RX ORDER — FAMOTIDINE 20 MG/1
20 TABLET, FILM COATED ORAL
Status: ON HOLD | COMMUNITY
Start: 2018-04-17 | End: 2022-05-20

## 2018-09-26 RX ORDER — CLOPIDOGREL BISULFATE 75 MG/1
75 TABLET ORAL
Status: ON HOLD | COMMUNITY
Start: 2018-05-11 | End: 2022-09-12

## 2018-09-26 RX ORDER — ASPIRIN 81 MG/1
81 TABLET, CHEWABLE ORAL
COMMUNITY
Start: 2018-05-11 | End: 2019-05-11

## 2018-09-26 NOTE — ED AVS SNAPSHOT
[facility] Emergency Department    [address] [place] [address]    Phone:  [phone]    Fax:  [phone]                                       Markie Rico   MRN: [id number]    Department:  [facility] Emergency Department   Date of Visit:  9/26/2018           After Visit Summary Signature Page     I have received my discharge instructions, and my questions have been answered. I have discussed any challenges I see with this plan with the nurse or doctor.    ..........................................................................................................................................  Patient/Patient Representative Signature      ..........................................................................................................................................  Patient Representative Print Name and Relationship to Patient    ..................................................               ................................................  Date                                   Time    ..........................................................................................................................................  Reviewed by Signature/Title    ...................................................              ..............................................  Date                                               Time          22EPIC Rev 08/18

## 2018-09-26 NOTE — ED TRIAGE NOTES
Patient has a tooth that needs to be pulled but dentist cannot pull it due to the blood thinner he takes and his recent hx of heart attack.

## 2018-09-26 NOTE — ED AVS SNAPSHOT
[facility] Emergency Department    [address] [place] [address]    Phone:  [phone]    Fax:  [phone]                                       Markie Rico   MRN: [id number]    Department:  [facility] Emergency Department   Date of Visit:  9/26/2018           Patient Information     Date Of Birth          1960        Your diagnoses for this visit were:     Pain, dental        You were seen by Flo Loyd MD.      Follow-up Information     Schedule an appointment as soon as possible for a visit with your dentist.        Follow up with [facility] Emergency Department.    Specialty:  EMERGENCY MEDICINE    Why:  If symptoms worsen or any other concern    Contact information:    [address]   [place] [address] [phone]    Additional information:    From [address]: Exit at [address] on [address] side of [place]. Turn right on [address]. Turn left at stoplight on [address]. [facility] will be in view two blocks ahead      Discharge References/Attachments     DENTAL ABSCESS (ENGLISH)      24 Hour Appointment Hotline       To make an appointment at any [place] clinic, call [phone]). If you don't have a family doctor or clinic, we will help you find one. [place] clinics are conveniently located to serve the needs of you and your family.             Review of your medicines      START taking        Dose / Directions Last dose taken    amoxicillin 875 MG tablet   Commonly known as:  AMOXIL   Dose:  875 mg   Quantity:  20 tablet        Take 1 tablet (875 mg) by mouth 2 times daily for 10 days   Refills:  0        HYDROcodone-acetaminophen 5-325 MG per tablet   Commonly known as:  NORCO   Dose:  1-2 tablet   Quantity:  12 tablet        Take 1-2 tablets by mouth every 6 hours as needed for pain   Refills:  0          Our records show that you are taking the medicines listed below. If these are incorrect,  please call your family doctor or clinic.        Dose / Directions Last dose taken    acetaminophen 325 MG tablet   Commonly known as:  TYLENOL   Dose:  650 mg   Quantity:  100 tablet        Take 2 tablets (650 mg) by mouth every 4 hours as needed for other (multimodal surgical pain management along with NSAIDS and opioid medication as indicated based on pain control and physical function.)   Refills:  0        aspirin 81 MG chewable tablet   Dose:  81 mg        Take 81 mg by mouth   Refills:  0        atorvastatin 80 MG tablet   Commonly known as:  LIPITOR   Dose:  80 mg        Take 80 mg by mouth daily   Refills:  0        CAYENNE PO        Refills:  0        CENTRUM MEN PO   Dose:  1 tablet        Take 1 tablet by mouth daily   Refills:  0        Cinnamon Bark Powd        Refills:  0        clopidogrel 75 MG tablet   Commonly known as:  PLAVIX   Dose:  75 mg        Take 75 mg by mouth   Refills:  0        famotidine 20 MG tablet   Commonly known as:  PEPCID   Dose:  20 mg        Take 20 mg by mouth   Refills:  0        lisinopril 10 MG tablet   Commonly known as:  PRINIVIL/ZESTRIL   Dose:  10 mg   Quantity:  90 tablet        Take 1 tablet by mouth daily. Take 10 mg by mouth daily.   Refills:  3        MAGNESIUM OXIDE PO   Dose:  400 mg        Take 400 mg by mouth daily   Refills:  0        metoprolol succinate 50 MG 24 hr tablet   Commonly known as:  TOPROL XL   Dose:  50 mg   Quantity:  90 tablet        Take 1 tablet by mouth daily.   Refills:  3        NIACIN (ANTIHYPERLIPIDEMIC) PO   Dose:  500 mg        Take 500 mg by mouth At Bedtime   Refills:  0        nitroGLYcerin 0.4 MG sublingual tablet   Commonly known as:  NITROSTAT   Dose:  0.4 mg   Quantity:  15 tablet        Place 1 tablet under the tongue every 5 minutes as needed.   Refills:  3        order for DME   Quantity:  1 Device        Equipment being ordered: Walker () Treatment Diagnosis: s/p joint replacement   Refills:  0        POTASSIUM  CHLORIDE CR PO   Dose:  10 mEq        Take 10 mEq by mouth daily.   Refills:  0        rosuvastatin 40 MG tablet   Commonly known as:  CRESTOR   Dose:  40 mg        Take 40 mg by mouth   Refills:  0        TURMERIC PO        Refills:  0                Information about OPIOIDS     PRESCRIPTION OPIOIDS: WHAT YOU NEED TO KNOW   We gave you an opioid (narcotic) pain medicine. It is important to manage your pain, but opioids are not always the best choice. You should first try all the other options your care team gave you. Take this medicine for as short a time (and as few doses) as possible.    Some activities can increase your pain, such as bandage changes or therapy sessions. It may help to take your pain medicine 30 to 60 minutes before these activities. Reduce your stress by getting enough sleep, working on hobbies you enjoy and practicing relaxation or meditation. Talk to your care team about ways to manage your pain beyond prescription opioids.    These medicines have risks:    DO NOT drive when on new or higher doses of pain medicine. These medicines can affect your alertness and reaction times, and you could be arrested for driving under the influence (DUI). If you need to use opioids long-term, talk to your care team about driving.    DO NOT operate heavy machinery    DO NOT do any other dangerous activities while taking these medicines.    DO NOT drink any alcohol while taking these medicines.     If the opioid prescribed includes acetaminophen, DO NOT take with any other medicines that contain acetaminophen. Read all labels carefully. Look for the word  acetaminophen  or  Tylenol.  Ask your pharmacist if you have questions or are unsure.    You can get addicted to pain medicines, especially if you have a history of addiction (chemical, alcohol or substance dependence). Talk to your care team about ways to reduce this risk.    All opioids tend to cause constipation. Drink plenty of water and eat foods that  have a lot of fiber, such as fruits, vegetables, prune juice, apple juice and high-fiber cereal. Take a laxative (Miralax, milk of magnesia, Colace, Senna) if you don t move your bowels at least every other day. Other side effects include upset stomach, sleepiness, dizziness, throwing up, tolerance (needing more of the medicine to have the same effect), physical dependence and slowed breathing.    Store your pills in a secure place, locked if possible. We will not replace any lost or stolen medicine. If you don t finish your medicine, please throw away (dispose) as directed by your pharmacist. The [place] [employer]ution [employer] has more information about safe disposal: [email or web address]://[email or web address]/living-[email or web address]/managing-unwanted-medications        Prescriptions were sent or printed at these locations (2 Prescriptions)                   [facility] [place], [place] - [address]    [address] , [place] [address]    Telephone:  [phone]   Fax:  [phone]   Hours:                  E-Prescribed (1 of 2)         amoxicillin (AMOXIL) 875 MG tablet                 Printed at Department/Unit printer (1 of 2)         HYDROcodone-acetaminophen (NORCO) 5-325 MG per tablet                Orders Needing Specimen Collection     None      Pending Results     No orders found from 9/24/2018 to 9/27/2018.            Pending Culture Results     No orders found from 9/24/2018 to 9/27/2018.            Pending Results Instructions     If you had any lab results that were not finalized at the time of your Discharge, you can call the ED Lab Result RN at [phone]. You will be contacted by this team for any positive Lab results or changes in treatment. The nurses are available 7 days a week from 10A to 6:30P.  You can leave a message 24 hours per day and they will return your call.        Thank you for choosing [facility]       Thank you for choosing [facility] for your care. Our goal is always to provide  you with excellent care. Hearing back from our patients is one way we can continue to improve our services. Please take a few minutes to complete the written survey that you may receive in the mail after you visit with us. Thank you!        [employer] Information     [employer] gives you secure access to your electronic health record. If you see a primary care provider, you can also send messages to your care team and make appointments. If you have questions, please call your primary care clinic.  If you do not have a primary care provider, please call [phone] and they will assist you.        Care EveryWhere ID     This is your Care EveryWhere ID. This could be used by other organizations to access your [facility] medical records  [id number]        Equal Access to Services     ABRAHAM MARTINEZ : Ale Selby, rosalino perez, marilee orr, lela root. So [facility] [phone].    ATENCIÓN: Si habla español, tiene a martin disposición servicios gratuitos de asistencia lingüística. Llame al [phone].    We comply with applicable federal civil rights laws and [place] laws. We do not discriminate on the basis of race, color, national origin, age, disability, sex, sexual orientation, or gender identity.            After Visit Summary       This is your record. Keep this with you and show to your community pharmacist(s) and doctor(s) at your next visit.

## 2018-09-27 NOTE — ED PROVIDER NOTES
History     Chief Complaint   Patient presents with     Dental Pain     The history is provided by the patient.     Markie Rico is a 57 year old male who presents to the ED for dental pain. He has a medical history of a heart attack. Patient is experiencing an abscess on both sides of his mouth, an ear ache, and a sore throat. He has a tooth that needs to be pulled, but his Oral Surgeon cannot pull it due to the blood thinner he takes. They will not take him off of his blood thinner for 1 year. 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 He is not allergic nor addicted to any pain medication.   Pain is dull, achy and worse with movement of his jaw.        Patient Active Problem List   Diagnosis     History of acute myocardial infarction of inferior wall     Hyperlipidemia LDL goal <100     Morbid obesity (H)     Multiple joint pain     CAD (coronary artery disease)     Medication side effect     Benign essential hypertension     Primary osteoarthritis of both hips     S/P total hip arthroplasty     Past Medical History:   Diagnosis Date     Diverticulitis      Old myocardial infarction     s/p successful emergent revascualrization, PTCR/stent at the LAD for acute MI     Old myocardial infarction 06/07/11    Non-Q-wave MI-[place]     Other and unspecified hyperlipidemia      Tobacco use disorder        Past Surgical History:   Procedure Laterality Date     ARTHROPLASTY HIP Right 2/12/2018    Procedure: ARTHROPLASTY HIP;  right total hip arthroplasty;  Surgeon: Vineet Bedolla MD;  Location: [facility] OR     C ANESTH,DX ARTHROSCOPIC PROC KNEE JOINT       C COLOSTOMY       C UNLISTED LAPAROSCOPY PROC,INTESTINE  7/25/2003    Exploratory Lap. Lysis of adhesions. Sigmoid colostomy takedown and stapled coloproctostomy.     HC PTCA SINGLE VESSEL      2 stents       Family History   Problem Relation Age of Onset     Lipids Mother      Psychotic Disorder Mother      anxiety     Hypertension Mother      Prostate Cancer Father        at 74     Diabetes Sister        Social History   Substance Use Topics     Smoking status: Former Smoker     Quit date: 2000     Smokeless tobacco: Current User     Types: Chew     Alcohol use Yes      Comment: Occasional          There is no immunization history on file for this patient.       Allergies   Allergen Reactions     No Known Allergies        Current Outpatient Prescriptions   Medication Sig Dispense Refill     amoxicillin (AMOXIL) 875 MG tablet Take 1 tablet (875 mg) by mouth 2 times daily for 10 days 20 tablet 0     aspirin 81 MG chewable tablet Take 81 mg by mouth       clopidogrel (PLAVIX) 75 MG tablet Take 75 mg by mouth       famotidine (PEPCID) 20 MG tablet Take 20 mg by mouth       HYDROcodone-acetaminophen (NORCO) 5-325 MG per tablet Take 1-2 tablets by mouth every 6 hours as needed for pain 12 tablet 0     rosuvastatin (CRESTOR) 40 MG tablet Take 40 mg by mouth       acetaminophen (TYLENOL) 325 MG tablet Take 2 tablets (650 mg) by mouth every 4 hours as needed for other (multimodal surgical pain management along with NSAIDS and opioid medication as indicated based on pain control and physical function.) 100 tablet 0     atorvastatin (LIPITOR) 80 MG tablet Take 80 mg by mouth daily       Capsicum, Cayenne, (CAYENNE PO)        Cinnamon Bark POWD        lisinopril (PRINIVIL,ZESTRIL) 10 MG tablet Take 1 tablet by mouth daily. Take 10 mg by mouth daily. 90 tablet 3     MAGNESIUM OXIDE PO Take 400 mg by mouth daily        metoprolol (TOPROL XL) 50 MG 24 hr tablet Take 1 tablet by mouth daily. (Patient taking differently: Take 25 mg by mouth 2 times daily ) 90 tablet 3     Multiple Vitamins-Minerals (CENTRUM MEN PO) Take 1 tablet by mouth daily       NIACIN, ANTIHYPERLIPIDEMIC, PO Take 500 mg by mouth At Bedtime        nitroglycerin (NITROSTAT) 0.4 MG SL tablet Place 1 tablet under the tongue every 5 minutes as needed. 15 tablet 3     order for DME Equipment being ordered: Walker  ()  Treatment Diagnosis: s/p joint replacement 1 Device 0     POTASSIUM CHLORIDE CR PO Take 10 mEq by mouth daily.       TURMERIC PO            Review of Systems   All other systems reviewed and are negative.      Physical Exam   BP: (!) 175/107  Pulse: 86  Heart Rate: 87  Temp: 98.3  F (36.8  C)  Resp: 18  Weight: 131.5 kg (290 lb)  SpO2: 96 %      Physical Exam   Constitutional: He is oriented to person, place, and time. No distress.   HENT:   Head: Normocephalic and atraumatic.    tooth number 18 at base has swelling at its base with apical abscess. I was able to milk out the visible component of purulence.     Eyes: Conjunctivae and EOM are normal.   Neck: Normal range of motion.   Cardiovascular: Normal rate, regular rhythm, normal heart sounds and intact distal pulses.    No murmur heard.  Pulmonary/Chest: Effort normal and breath sounds normal. No respiratory distress. He has no wheezes. He has no rales.   Abdominal: Soft. Bowel sounds are normal. He exhibits no distension. There is no tenderness.   Musculoskeletal: He exhibits no edema.   Neurological: He is alert and oriented to person, place, and time.   Skin: Skin is warm and dry. No rash noted. He is not diaphoretic. No pallor.   Psychiatric: He has a normal mood and affect. His behavior is normal.   Nursing note and vitals reviewed.      ED Course     ED Course     Procedures               Critical Care time:  none               No results found for this or any previous visit (from the past 24 hour(s)).    Medications - No data to display      Assessments & Plan (with Medical Decision Making)  57-year-old male with dental abscess.  I was able to milk out the visible purulent material during exam.  Placed on amoxicillin.  Pain medications as prescribed below if needed.  Follow-up with dentistry.  Return anytime sooner to the emergency department if condition worsens or other concern.     I have reviewed the nursing notes.    I have reviewed the  findings, diagnosis, plan and need for follow up with the patient.       Discharge Medication List as of 9/26/2018  7:13 PM      START taking these medications    Details   amoxicillin (AMOXIL) 875 MG tablet Take 1 tablet (875 mg) by mouth 2 times daily for 10 days, Disp-20 tablet, R-0, SHABNAM, E-Prescribe      HYDROcodone-acetaminophen (NORCO) 5-325 MG per tablet Take 1-2 tablets by mouth every 6 hours as needed for pain, Disp-12 tablet, R-0, Local Print             Final diagnoses:   Pain, dental     This document serves as a record of services personally performed by Flo Loyd MD. It was created on their behalf by Zeenat Ventura, a trained medical scribe. The creation of this record is based on the provider's personal observations and the statements of the patient. This document has been checked and approved by the attending provider.    Note: Chart documentation done in part with Dragon Voice Recognition software. Although reviewed after completion, some word and grammatical errors may remain.    9/26/2018   [facility] EMERGENCY DEPARTMENT     Flo Loyd MD  09/26/18 2341

## 2018-10-18 ENCOUNTER — TRANSFERRED RECORDS (OUTPATIENT)
Dept: HEALTH INFORMATION MANAGEMENT | Facility: CLINIC | Age: 58
End: 2018-10-18

## 2019-05-20 ENCOUNTER — TRANSFERRED RECORDS (OUTPATIENT)
Dept: HEALTH INFORMATION MANAGEMENT | Facility: CLINIC | Age: 59
End: 2019-05-20

## 2019-05-23 ENCOUNTER — TRANSFERRED RECORDS (OUTPATIENT)
Dept: HEALTH INFORMATION MANAGEMENT | Facility: CLINIC | Age: 59
End: 2019-05-23

## 2019-06-20 ENCOUNTER — OFFICE VISIT (OUTPATIENT)
Dept: ORTHOPEDICS | Facility: CLINIC | Age: 59
End: 2019-06-20
Payer: COMMERCIAL

## 2019-06-20 ENCOUNTER — ANCILLARY PROCEDURE (OUTPATIENT)
Dept: GENERAL RADIOLOGY | Facility: CLINIC | Age: 59
End: 2019-06-20
Attending: ORTHOPAEDIC SURGERY
Payer: COMMERCIAL

## 2019-06-20 VITALS
SYSTOLIC BLOOD PRESSURE: 120 MMHG | HEIGHT: 68 IN | DIASTOLIC BLOOD PRESSURE: 70 MMHG | BODY MASS INDEX: 47.74 KG/M2 | WEIGHT: 315 LBS

## 2019-06-20 DIAGNOSIS — M25.552 LEFT HIP PAIN: ICD-10-CM

## 2019-06-20 DIAGNOSIS — M16.12 PRIMARY OSTEOARTHRITIS OF LEFT HIP: Primary | ICD-10-CM

## 2019-06-20 PROCEDURE — 99213 OFFICE O/P EST LOW 20 MIN: CPT | Performed by: ORTHOPAEDIC SURGERY

## 2019-06-20 PROCEDURE — 73502 X-RAY EXAM HIP UNI 2-3 VIEWS: CPT | Mod: TC

## 2019-06-20 ASSESSMENT — PAIN SCALES - GENERAL: PAINLEVEL: NO PAIN (0)

## 2019-06-20 ASSESSMENT — MIFFLIN-ST. JEOR: SCORE: 2249.43

## 2019-06-20 NOTE — PROGRESS NOTES
ORTHOPEDIC CONSULT      Chief Complaint: Markie Rico is a 58 year old male who is being seen for Chief Complaint   Patient presents with     Musculoskeletal Problem     left hip pain     Consult     [facility]       History of Present Illness:   Markie Rico is a 58 year old male who is seen in [facility] of [facility].  Presents with 3-year history worth of left hip and lateral thigh pain.  Getting worse.  History of similar pain on the contralateral side that he underwent a right total hip for that resolved his symptoms.  He has attempted Ultram, Aleve, Tylenol, use of a cane with no improvement.  He presents here to discuss proceeding with left hip replacement.        Patient's past medical, surgical, social and family histories reviewed.     Past Medical History:   Diagnosis Date     Diverticulitis      Old myocardial infarction     s/p successful emergent revascualrization, PTCR/stent at the LAD for acute MI     Old myocardial infarction 06/07/11    Non-Q-wave MI-[place]     Other and unspecified hyperlipidemia      Tobacco use disorder        Past Surgical History:   Procedure Laterality Date     ARTHROPLASTY HIP Right 2/12/2018    Procedure: ARTHROPLASTY HIP;  right total hip arthroplasty;  Surgeon: Vineet Bedolla MD;  Location: [facility]     C ANESTH,DX ARTHROSCOPIC PROC KNEE JOINT       C COLOSTOMY       C UNLISTED LAPAROSCOPY PROC,INTESTINE  7/25/2003    Exploratory Lap. Lysis of adhesions. Sigmoid colostomy takedown and stapled coloproctostomy.     HC PTCA SINGLE VESSEL      2 stents       Medications:    Current Outpatient Medications on File Prior to Visit:  acetaminophen (TYLENOL) 325 MG tablet Take 2 tablets (650 mg) by mouth every 4 hours as needed for other (multimodal surgical pain management along with NSAIDS and opioid medication as indicated based on pain control and physical function.)   Capsicum, Cayenne, (CAYENNE PO)    Cinnamon Bark POWD   "  lisinopril (PRINIVIL,ZESTRIL) 10 MG tablet Take 1 tablet by mouth daily. Take 10 mg by mouth daily.   MAGNESIUM OXIDE PO Take 400 mg by mouth daily    metoprolol (TOPROL XL) 50 MG 24 hr tablet Take 1 tablet by mouth daily. (Patient taking differently: Take 25 mg by mouth 2 times daily )   Multiple Vitamins-Minerals (CENTRUM MEN PO) Take 1 tablet by mouth daily   NIACIN, ANTIHYPERLIPIDEMIC, PO Take 500 mg by mouth At Bedtime    order for DME Equipment being ordered: Walker ()Treatment Diagnosis: s/p joint replacement   POTASSIUM CHLORIDE CR PO Take 10 mEq by mouth daily.   TURMERIC PO    [] aspirin 81 MG chewable tablet Take 81 mg by mouth   clopidogrel (PLAVIX) 75 MG tablet Take 75 mg by mouth   famotidine (PEPCID) 20 MG tablet Take 20 mg by mouth   nitroglycerin (NITROSTAT) 0.4 MG SL tablet Place 1 tablet under the tongue every 5 minutes as needed. (Patient not taking: Reported on 2019)   rosuvastatin (CRESTOR) 40 MG tablet Take 40 mg by mouth     No current facility-administered medications on file prior to visit.     Allergies   Allergen Reactions     No Known Allergies        Social History     Occupational History     Occupation: [employer]   Tobacco Use     Smoking status: Former Smoker     Last attempt to quit: 2000     Years since quittin.4     Smokeless tobacco: Current User     Types: Chew   Substance and Sexual Activity     Alcohol use: Yes     Comment: Occasional     Drug use: No     Sexual activity: Never     Partners: Female     Birth control/protection: Surgical       Family History   Problem Relation Age of Onset     Lipids Mother      Psychotic Disorder Mother         anxiety     Hypertension Mother      Prostate Cancer Father          at 74     Diabetes Sister        REVIEW OF SYSTEMS  10 point review systems performed otherwise negative as noted as per history of present illness.    Physical Exam:  Vitals: /70   Ht 1.715 m (5' 7.5\")   Wt 145.3 kg (320 " lb 4.8 oz)   BMI 49.43 kg/m    BMI= Body mass index is 49.43 kg/m .  Constitutional: healthy, alert and no acute distress   Psychiatric: mentation appears normal and affect normal/bright  NEURO: no focal deficits  RESP: Normal with easy respirations and no use of accessory muscles to breathe, no audible wheezing or retractions  CV: LLE:  midcalf and down-  non-pitting edema           SKIN: No erythema, rashes, excoriation, or breakdown. No evidence of infection.   JOINT/EXTREMITIES:left hip: Active motion limited to approximately 100 degrees.  No focal areas of tenderness.  0 degrees internal rotation.     GAIT: antalgic and with assistive device    Diagnostic Modalities:  left hip X-ray: Extensive degenerative changes left hip with bone-on-bone arthritis and some bony erosion of the femoral head.  Independent visualization of the images was performed.      Impression: left hip primary osteoarthritis in a 58-year-old male with a BMI of 50    Plan:  All of the above pertinent physical exam and imaging modalities findings was reviewed with Markie.    Options discussed.  Unfortunately he is not a surgical candidate based on his weight.  He is gained approximately 35 pounds since his last surgery.  Typical cutoff for hip replacement is a BMI of 40.  This would require him to lose approximately 60 pounds.  I offered him a nutrition consult at which she declined as he is done it before.  I offered physical therapy at which he declined.    Unfortunately, at this point there is no much I can offer him.    Return to clinic PRN, or sooner as needed for changes.  Re-x-ray on return: No    Dion Najera D.O.

## 2019-06-20 NOTE — LETTER
6/20/2019         RE: Markie Rico  [address] [facility] [address]        Dear Colleague,    Thank you for referring your patient, Markie Rico, to the [facility]. Please see a copy of my visit note below.    ORTHOPEDIC CONSULT      Chief Complaint: Markie Rico is a 58 year old male who is being seen for Chief Complaint   Patient presents with     Musculoskeletal Problem     left hip pain     Consult     [facility]       History of Present Illness:   Markie Rico is a 58 year old male who is seen in consultation [facility] request of [facility] for [facility] of [facility] pain.  Presents with 3-year history worth of left hip and lateral thigh pain.  Getting worse.  History of similar pain on the contralateral side that he underwent a right total hip for that resolved his symptoms.  He has attempted Ultram, Aleve, Tylenol, use of a cane with no improvement.  He presents here to discuss proceeding with left hip replacement.        Patient's past medical, surgical, social and family histories reviewed.     Past Medical History:   Diagnosis Date     Diverticulitis      Old myocardial infarction     s/p successful emergent revascualrization, PTCR/stent at the [facility] for acute MI     Old myocardial infarction 06/07/11    Non-Q-wave MI-[place]     Other and unspecified hyperlipidemia      Tobacco use disorder        Past Surgical History:   Procedure Laterality Date     ARTHROPLASTY HIP Right 2/12/2018    Procedure: ARTHROPLASTY HIP;  right total hip arthroplasty;  Surgeon: Vineet Bedolla MD;  Location: [facility] OR     C ANESTH,DX ARTHROSCOPIC PROC KNEE JOINT       C COLOSTOMY       C UNLISTED LAPAROSCOPY PROC,INTESTINE  7/25/2003    Exploratory Lap. Lysis of adhesions. Sigmoid colostomy takedown and stapled coloproctostomy.     HC PTCA SINGLE VESSEL      2 stents       Medications:    Current Outpatient Medications on File Prior to Visit:  acetaminophen (TYLENOL) 325 MG tablet Take 2 tablets (650 mg) by  mouth every 4 hours as needed for other (multimodal surgical pain management along with NSAIDS and opioid medication as indicated based on pain control and physical function.)   Capsicum, Cayenne, (CAYENNE PO)    Cinnamon Bark POWD    lisinopril (PRINIVIL,ZESTRIL) 10 MG tablet Take 1 tablet by mouth daily. Take 10 mg by mouth daily.   MAGNESIUM OXIDE PO Take 400 mg by mouth daily    metoprolol (TOPROL XL) 50 MG 24 hr tablet Take 1 tablet by mouth daily. (Patient taking differently: Take 25 mg by mouth 2 times daily )   Multiple Vitamins-Minerals (CENTRUM MEN PO) Take 1 tablet by mouth daily   NIACIN, ANTIHYPERLIPIDEMIC, PO Take 500 mg by mouth At Bedtime    order for DME Equipment being ordered: Walker ()Treatment Diagnosis: s/p joint replacement   POTASSIUM CHLORIDE CR PO Take 10 mEq by mouth daily.   TURMERIC PO    [] aspirin 81 MG chewable tablet Take 81 mg by mouth   clopidogrel (PLAVIX) 75 MG tablet Take 75 mg by mouth   famotidine (PEPCID) 20 MG tablet Take 20 mg by mouth   nitroglycerin (NITROSTAT) 0.4 MG SL tablet Place 1 tablet under the tongue every 5 minutes as needed. (Patient not taking: Reported on 2019)   rosuvastatin (CRESTOR) 40 MG tablet Take 40 mg by mouth     No current facility-administered medications on file prior to visit.     Allergies   Allergen Reactions     No Known Allergies        Social History     Occupational History     Occupation: [employer]   Tobacco Use     Smoking status: Former Smoker     Last attempt to quit: 2000     Years since quittin.4     Smokeless tobacco: Current User     Types: Chew   Substance and Sexual Activity     Alcohol use: Yes     Comment: Occasional     Drug use: No     Sexual activity: Never     Partners: Female     Birth control/protection: Surgical       Family History   Problem Relation Age of Onset     Lipids Mother      Psychotic Disorder Mother         anxiety     Hypertension Mother      Prostate Cancer Father          " at 74     Diabetes Sister        REVIEW OF SYSTEMS  10 point review systems performed otherwise negative as noted as per history of present illness.    Physical Exam:  Vitals: /70   Ht 1.715 m (5' 7.5\")   Wt 145.3 kg (320 lb 4.8 oz)   BMI 49.43 kg/m     BMI= Body mass index is 49.43 kg/m .  Constitutional: healthy, alert and no acute distress   Psychiatric: mentation appears normal and affect normal/bright  NEURO: no focal deficits  RESP: Normal with easy respirations and no use of accessory muscles to breathe, no audible wheezing or retractions  CV: LLE:  midcalf and down-  non-pitting edema           SKIN: No erythema, rashes, excoriation, or breakdown. No evidence of infection.   JOINT/EXTREMITIES:left hip: Active motion limited to approximately 100 degrees.  No focal areas of tenderness.  0 degrees internal rotation.     GAIT: antalgic and with assistive device    Diagnostic Modalities:  left hip X-ray: Extensive degenerative changes left hip with bone-on-bone arthritis and some bony erosion of the femoral head.  Independent visualization of the images was performed.      Impression: left hip primary osteoarthritis in a 58-year-old male with a BMI of 50    Plan:  All of the above pertinent physical exam and imaging modalities findings was reviewed with Markie.    Options discussed.  Unfortunately he is not a surgical candidate based on his weight.  He is gained approximately 35 pounds since his last surgery.  Typical cutoff for hip replacement is a BMI of 40.  This would require him to lose approximately 60 pounds.  I offered him a nutrition consult at which she declined as he is done it before.  I offered physical therapy at which he declined.    Unfortunately, at this point there is no much I can offer him.    Return to clinic PRN, or sooner as needed for changes.  Re-x-ray on return: No    Dion Najera D.O.    Again, thank you for allowing me to participate in the care of your patient.  "       Sincerely,        Nicho Najera, DO

## 2020-11-30 ENCOUNTER — ANCILLARY PROCEDURE (OUTPATIENT)
Dept: GENERAL RADIOLOGY | Facility: CLINIC | Age: 60
End: 2020-11-30
Attending: ORTHOPAEDIC SURGERY
Payer: COMMERCIAL

## 2020-11-30 ENCOUNTER — OFFICE VISIT (OUTPATIENT)
Dept: ORTHOPEDICS | Facility: CLINIC | Age: 60
End: 2020-11-30
Payer: COMMERCIAL

## 2020-11-30 VITALS
HEIGHT: 68 IN | WEIGHT: 290 LBS | DIASTOLIC BLOOD PRESSURE: 83 MMHG | BODY MASS INDEX: 43.95 KG/M2 | SYSTOLIC BLOOD PRESSURE: 135 MMHG

## 2020-11-30 DIAGNOSIS — M16.12 PRIMARY OSTEOARTHRITIS OF LEFT HIP: ICD-10-CM

## 2020-11-30 DIAGNOSIS — M16.12 PRIMARY OSTEOARTHRITIS OF LEFT HIP: Primary | ICD-10-CM

## 2020-11-30 PROCEDURE — 73502 X-RAY EXAM HIP UNI 2-3 VIEWS: CPT | Performed by: RADIOLOGY

## 2020-11-30 PROCEDURE — 99213 OFFICE O/P EST LOW 20 MIN: CPT | Performed by: ORTHOPAEDIC SURGERY

## 2020-11-30 ASSESSMENT — MIFFLIN-ST. JEOR: SCORE: 2101.99

## 2020-11-30 ASSESSMENT — PAIN SCALES - GENERAL: PAINLEVEL: MODERATE PAIN (4)

## 2020-11-30 NOTE — LETTER
2020         RE: Markie Rico  [address] [facility] [address]        Dear Colleague,    Thank you for referring your patient, Markie Rico, to the [facility]. Please see a copy of my visit note below.    Office Visit-Follow up    Chief Complaint: Markie Rico is a 60 year old male who is being seen for   Chief Complaint   Patient presents with     RECHECK       History of Present Illness:   Today's visit:  Here to discuss his left hip. He has lost about 30 lbs since last visit and continues to work on it. He is taking tylenol and tramadol for his left hip pain    2019 visit:  Markie Rico is a 58 year old male who is seen [facility].  Presents with 3-year history worth of left hip and lateral thigh pain.  Getting worse.  History of similar pain on the contralateral side that he underwent a right total hip for that resolved his symptoms.  He has attempted Ultram, Aleve, Tylenol, use of a cane with no improvement.  He presents here to discuss proceeding with left hip replacement.    Social History     Occupational History     Occupation: [employer]   Tobacco Use     Smoking status: Former Smoker     Quit date: 2000     Years since quittin.9     Smokeless tobacco: Current User     Types: Chew   Substance and Sexual Activity     Alcohol use: Yes     Comment: Occasional     Drug use: No     Sexual activity: Never     Partners: Female     Birth control/protection: Surgical       REVIEW OF SYSTEMS  General: negative for, night sweats, dizziness, fatigue  Resp: No shortness of breath and no cough  CV: negative for chest pain, syncope or near-syncope  GI: negative for nausea, vomiting and diarrhea  : negative for dysuria and hematuria  Musculoskeletal: as above  Neurologic: negative for syncope   Hematologic: negative for bleeding disorder    Physical Exam:  Vitals: /83   Ht 1.715 m (5'  "7.5\")   Wt 131.5 kg (290 lb)   BMI 44.75 kg/m    BMI= Body mass index is 44.75 kg/m .  Constitutional: healthy, alert and no acute distress   Psychiatric: mentation appears normal and affect normal/bright  NEURO: no focal deficits  RESP: Normal with easy respirations and no use of accessory muscles to breathe, no audible wheezing or retractions  CV: LLE:  no edema         Regular rate and rhythm by palpation  SKIN: No erythema, rashes, excoriation, or breakdown. No evidence of infection.   JOINT/EXTREMITIES:left hip: active flexion 90 degrees. IR 0 deg.   GAIT: not tested             Diagnostic Modalities:  left hip X-ray: Bone-on-bone arthritis with deformity of the femoral head associated with osteophytes subchondral sclerosis and cystic changes.  Independent visualization of the images was performed.      Impression: left hip primary osteoarthritis BMI 45    Plan:  All of the above pertinent physical exam and imaging modalities findings was reviewed with Markie.    He has lost 30lbs and knows he needs to continue wt loss.     He will return end of Jan to see if can proceed with scheduling total hip arthroplasty.       Return to clinic PRN, or sooner as needed for changes.  Re-x-ray on return: No    Dion Najera D.O.            Again, thank you for allowing me to participate in the care of your patient.        Sincerely,        Nicho Najera, DO    "

## 2020-11-30 NOTE — PROGRESS NOTES
"Office Visit-Follow up    Chief Complaint: Markie Rico is a 60 year old male who is being seen for   Chief Complaint   Patient presents with     RECHECK       History of Present Illness:   Today's visit:  Here to discuss his left hip. He has lost about 30 lbs since last visit and continues to work on it. He is taking tylenol and tramadol for his left hip pain    2019 visit:  Markie Rico is a 58 year old male who is seen in [facility].  Presents with 3-year history worth of left hip and lateral thigh pain.  Getting worse.  History of similar pain on the contralateral side that he underwent a right total hip for that resolved his symptoms.  He has attempted Ultram, Aleve, Tylenol, use of a cane with no improvement.  He presents here to discuss proceeding with left hip replacement.    Social History     Occupational History     Occupation: [employer]   Tobacco Use     Smoking status: Former Smoker     Quit date: 2000     Years since quittin.9     Smokeless tobacco: Current User     Types: Chew   Substance and Sexual Activity     Alcohol use: Yes     Comment: Occasional     Drug use: No     Sexual activity: Never     Partners: Female     Birth control/protection: Surgical       REVIEW OF SYSTEMS  General: negative for, night sweats, dizziness, fatigue  Resp: No shortness of breath and no cough  CV: negative for chest pain, syncope or near-syncope  GI: negative for nausea, vomiting and diarrhea  : negative for dysuria and hematuria  Musculoskeletal: as above  Neurologic: negative for syncope   Hematologic: negative for bleeding disorder    Physical Exam:  Vitals: /83   Ht 1.715 m (5' 7.5\")   Wt 131.5 kg (290 lb)   BMI 44.75 kg/m    BMI= Body mass index is 44.75 kg/m .  Constitutional: healthy, alert and no acute distress   Psychiatric: mentation appears normal and affect normal/bright  NEURO: no focal deficits  RESP: Normal with " easy respirations and no use of accessory muscles to breathe, no audible wheezing or retractions  CV: LLE:  no edema         Regular rate and rhythm by palpation  SKIN: No erythema, rashes, excoriation, or breakdown. No evidence of infection.   JOINT/EXTREMITIES:left hip: active flexion 90 degrees. IR 0 deg.   GAIT: not tested             Diagnostic Modalities:  left hip X-ray: Bone-on-bone arthritis with deformity of the femoral head associated with osteophytes subchondral sclerosis and cystic changes.  Independent visualization of the images was performed.      Impression: left hip primary osteoarthritis BMI 45    Plan:  All of the above pertinent physical exam and imaging modalities findings was reviewed with Markie.    He has lost 30lbs and knows he needs to continue wt loss.     He will return end of Jan to see if can proceed with scheduling total hip arthroplasty.       Return to clinic PRN, or sooner as needed for changes.  Re-x-ray on return: No    Dion Najera D.O.

## 2022-03-15 ENCOUNTER — TRANSCRIBE ORDERS (OUTPATIENT)
Dept: OTHER | Age: 62
End: 2022-03-15

## 2022-03-15 DIAGNOSIS — Z86.0100 HISTORY OF COLONIC POLYPS: Primary | ICD-10-CM

## 2022-04-20 ENCOUNTER — TELEPHONE (OUTPATIENT)
Dept: GASTROENTEROLOGY | Facility: CLINIC | Age: 62
End: 2022-04-20

## 2022-04-20 NOTE — TELEPHONE ENCOUNTER
[facility]ative from the [facility] called with the patient regarding the [facility]oscopy. I advised that we have made 3 attempts to contact the pt. The patient stated that he is wanting to have his hip replaced before he schedules the colonoscopy. The surgery has not been scheduled yet. Confirmed that he had our number to give us a call to schedule when he is ready.

## 2022-05-13 ENCOUNTER — TELEPHONE (OUTPATIENT)
Dept: GASTROENTEROLOGY | Facility: CLINIC | Age: 62
End: 2022-05-13

## 2022-05-13 DIAGNOSIS — Z11.59 ENCOUNTER FOR SCREENING FOR OTHER VIRAL DISEASES: Primary | ICD-10-CM

## 2022-05-13 NOTE — TELEPHONE ENCOUNTER
Outbound call to Lakshmi WILLS  Gave instructions for locating prep and appt info in [facility].  Sent letter w/ information too.

## 2022-05-13 NOTE — TELEPHONE ENCOUNTER
Screening Questions    BlueKIND OF PREP RedLOCATION [review exclusion criteria] GreenSEDATION TYPE      1. Are you active on mychart? Y    2. What insurance is in the chart? [facility]      3.   Ordering/Referring Provider: Daija Roblero NP      4. BMI   (If greater than 40 review exclusion criteria [PAC APPT IF [MAC] @ [facility])  39.9  [If yes, BMI OVER 40-EXTENDED PREP]      **(Sedation review/consideration needed)**  Do you have a legal guardian or Medical Power of    and/or are you able to give consent for your medical care?     SELF     5. Have you had a positive covid test in the last 90 days?   N     6.  Are you currently on dialysis?   N [ If yes, G-PREP & HOSPITAL setting ONLY]     7.  Do you have chronic kidney disease?  N [ If yes, G-PREP ]    8.   Do you have a diagnosis of diabetes?   N   [ If yes, G-PREP ]    9.  On a regular basis do you go 3-5 days between bowel movements?   N   [ If yes, EXTENDED PREP]    10.  Are you taking any prescription pain medications on a routine schedule?    Y - TRAMADOL  [ If yes, EXTENDED PREP] [If yes, MAC]      11.   Do you have any chemical dependencies such as alcohol, street drugs, or methadone?    N [If yes, MAC]    12.   Do you have any history of post-traumatic stress syndrome, severe anxiety or history of psychosis?    N  [If yes, MAC]    13.  [FEMALES] Are you currently pregnant?     If yes, how many weeks?       Respiratory/Heart Screening:  [If yes to any of the following HOSPITAL setting only]     14. Do you have Pulmonary Hypertension [Lungs]?   N       15. Do you have UNCONTROLLED asthma?   N     16.  Do you use daily home oxygen?  N      17. Do you have mod to severe Obstructive Sleep Apnea?         (OKAY @ [facility]  [facility]  [facility]  [facility]  [facility]  MG - if pt is not on OXYGEN)  Y - RIKY       18.   Have you had a heart or lung transplant?   N      19.   Have you had a stroke or Transient ischemic attack (TIA - aka  mini stroke ) within 6 months?  (If yes, please  review exclusion criteria)  N     20.   In the past 6 months, have you had any heart related issues including cardiomyopathy or heart attack?   N           If yes, did it require cardiac stenting or other implantable device?   N      21.   Do you have any implantable devices in your body (pacemaker, defib, LVAD)? (If yes, please review exclusion criteria)  N- STENTS      22. Do you take nitroglycerin?   N           If yes, how often? N  (if yes, HOSPITAL setting ONLY)    23.  Are you currently taking any blood thinners?    [If yes, INFORM patient to follw up w/ ORDERING PROVIDER FOR BRIDGING INSTRUCTIONS]     Y - BABY ASPRIN     24.   Do you transfer independently?                (If NO, please HOSPITAL setting ONLY)  N - HELP TO TRANSFER (BAD HIP)      25.   Preferred LOCAL Pharmacy for Pre Prescription:           [facility] - [place], [place] - [address]      Scheduling Details  (Please ask for phone number if not scheduled by patient)      Caller : Markie Rico    Date of Procedure: 05/20/2022  Surgeon: MARTY   Location: [facility]; [address] [place] [address] , [place] [address]      Sedation Type: MAC  l PER LOC   Conscious Sedation- Needs  for 6 hours after the procedure  MAC/General-Needs  for 24 hours after procedure    N :[Pre-op Required] at [facility]  SH  MG and OR for MAC sedation   (advise patient they will need a pre-op WITH IN 30 DAYS of procedure date)     Type of Procedure Scheduled:   Lower Endoscopy [Colonoscopy]    Which Colonoscopy Prep was Sent?:   EXT PREP - PAIN MEDS     [facility] PATIENTS & [facility] PATIENTS NEEDS EXTENDED PREP       Informed patient they will need an adult  Y  Cannot take any type of public or medical transportation alone    [facility]Pro[facility] or Externally: Y    Confirmed Nurse will call to complete assessment Y    Additional comments: N

## 2022-05-13 NOTE — TELEPHONE ENCOUNTER
----- Message from Ginny Rinaldi RN sent at 5/13/2022  3:00 PM CDT -----  Regarding: Colon prep instructions  Contact: [phone]  Pt has not received any colonoscopy prep instructions yet for his procedure that is scheduled on 5/20. He prefers they be added into his My Chart.     Ginny GALLAGHER RN

## 2022-05-16 ENCOUNTER — TRANSFERRED RECORDS (OUTPATIENT)
Dept: HEALTH INFORMATION MANAGEMENT | Facility: CLINIC | Age: 62
End: 2022-05-16

## 2022-05-16 ENCOUNTER — LAB (OUTPATIENT)
Dept: LAB | Facility: CLINIC | Age: 62
End: 2022-05-16
Payer: COMMERCIAL

## 2022-05-16 DIAGNOSIS — Z11.59 ENCOUNTER FOR SCREENING FOR OTHER VIRAL DISEASES: ICD-10-CM

## 2022-05-16 LAB — SARS-COV-2 RNA RESP QL NAA+PROBE: NEGATIVE

## 2022-05-16 PROCEDURE — U0005 INFEC AGEN DETEC AMPLI PROBE: HCPCS

## 2022-05-16 PROCEDURE — U0003 INFECTIOUS AGENT DETECTION BY NUCLEIC ACID (DNA OR RNA); SEVERE ACUTE RESPIRATORY SYNDROME CORONAVIRUS 2 (SARS-COV-2) (CORONAVIRUS DISEASE [COVID-19]), AMPLIFIED PROBE TECHNIQUE, MAKING USE OF HIGH THROUGHPUT TECHNOLOGIES AS DESCRIBED BY CMS-2020-01-R: HCPCS

## 2022-05-19 NOTE — H&P
[facility] History and Physical    Markie Rico MRN# [id number]   Age: 61 year old YOB: 1960     Date of Admission:  (Not on file)    [facility]  [facility] provider[facility]          Impression and Plan:   Impression:   History of colonic polyps [Z86.010]  Last colonoscopy -results not viewable      Plan:   Proceed to Colonoscopy as planned.  The procedure, risks(bleeding, perforation), benefits and alternatives were discussed and the patient agrees to proceed. Cleared for Anesthesia             Chief Complaint:   History of colonic polyps [Z86.010]    History is obtained from the patient          History of Present Illness:   This 61 year old male is being seen at this time for evaluation for colonoscopy.  No family hx of colon CA. No complaints.           Past Medical History:     Past Medical History:   Diagnosis Date     Diverticulitis      Old myocardial infarction     s/p successful emergent revascualrization, PTCR/stent at the [facility] for acute MI     Old myocardial infarction 11    Non-Q-wave MI-[place]     Other and unspecified hyperlipidemia      Tobacco use disorder             Past Surgical History:     Past Surgical History:   Procedure Laterality Date     ARTHROPLASTY HIP Right 2018    Procedure: ARTHROPLASTY HIP;  right total hip arthroplasty;  Surgeon: Vineet Bedolla MD;  Location:  OR     ZZC ANESTH,DX ARTHROSCOPIC PROC KNEE JOINT       ZZC COLOSTOMY       ZZC UNLISTED LAPAROSCOPY PROC,INTESTINE  2003    Exploratory Lap. Lysis of adhesions. Sigmoid colostomy takedown and stapled coloproctostomy.     ZZ PTCA SINGLE VESSEL      2 stents            Social History:     Social History     Tobacco Use     Smoking status: Former Smoker     Quit date: 2000     Years since quittin.3     Smokeless tobacco: Current User     Types: Chew   Substance Use Topics     Alcohol use: Yes     Comment: Occasional            Family  History:     Family History   Problem Relation Age of Onset     Lipids Mother      Psychotic Disorder Mother         anxiety     Hypertension Mother      Prostate Cancer Father          at 74     Diabetes Sister             Immunizations:     VACCINE/DOSE   Diptheria   DPT   DTAP   HBIG   Hepatitis A   Hepatitis B   HIB   Influenza   Measles   Meningococcal   MMR   Mumps   Pneumococcal   Polio   Rubella   Small Pox   TDAP   Varicella   Zoster            Allergies:     Allergies   Allergen Reactions     No Known Allergies             Medications:     No current facility-administered medications for this encounter.     Current Outpatient Medications   Medication Sig     clopidogrel (PLAVIX) 75 MG tablet Take 75 mg by mouth     acetaminophen (TYLENOL) 325 MG tablet Take 2 tablets (650 mg) by mouth every 4 hours as needed for other (multimodal surgical pain management along with NSAIDS and opioid medication as indicated based on pain control and physical function.)     Capsicum, Cayenne, (CAYENNE PO)      Cinnamon Bark POWD      famotidine (PEPCID) 20 MG tablet Take 20 mg by mouth     lisinopril (PRINIVIL,ZESTRIL) 10 MG tablet Take 1 tablet by mouth daily. Take 10 mg by mouth daily.     MAGNESIUM OXIDE PO Take 400 mg by mouth daily      metoprolol (TOPROL XL) 50 MG 24 hr tablet Take 1 tablet by mouth daily. (Patient taking differently: Take 25 mg by mouth 2 times daily )     Multiple Vitamins-Minerals (CENTRUM MEN PO) Take 1 tablet by mouth daily     NIACIN, ANTIHYPERLIPIDEMIC, PO Take 500 mg by mouth At Bedtime      nitroglycerin (NITROSTAT) 0.4 MG SL tablet Place 1 tablet under the tongue every 5 minutes as needed. (Patient not taking: Reported on 2019)     order for DME Equipment being ordered: Walker ()  Treatment Diagnosis: s/p joint replacement     POTASSIUM CHLORIDE CR PO Take 10 mEq by mouth daily.     rosuvastatin (CRESTOR) 40 MG tablet Take 40 mg by mouth     TURMERIC PO              Review of  Systems:   The review of systems was positive for the following findings.  None.  The remainder of the review of systems was unremarkable.          Physical Exam:   All vitals have been reviewed  There were no vitals taken for this visit.  No intake or output data in the 24 hours ending 05/19/22 0825  SHEENT examination revealed NC/AT, EOMI.  Examination of the chest revealed CTA.  Examination of the heart revealed RRR.  Examination of the abdomen revealed soft, non tender.  The neuromuscular examination was NL.          Data:   All laboratory data reviewed  No results found for any visits on 05/20/22.  -     Mundo Velazquez MD, FACS

## 2022-05-20 ENCOUNTER — ANESTHESIA (OUTPATIENT)
Dept: GASTROENTEROLOGY | Facility: CLINIC | Age: 62
End: 2022-05-20
Payer: COMMERCIAL

## 2022-05-20 ENCOUNTER — HOSPITAL ENCOUNTER (OUTPATIENT)
Facility: CLINIC | Age: 62
Discharge: HOME | End: 2022-05-20
Attending: SPECIALIST | Admitting: SPECIALIST
Payer: COMMERCIAL

## 2022-05-20 ENCOUNTER — ANESTHESIA EVENT (OUTPATIENT)
Dept: GASTROENTEROLOGY | Facility: CLINIC | Age: 62
End: 2022-05-20
Payer: COMMERCIAL

## 2022-05-20 VITALS
RESPIRATION RATE: 18 BRPM | DIASTOLIC BLOOD PRESSURE: 76 MMHG | TEMPERATURE: 97.9 F | HEART RATE: 75 BPM | SYSTOLIC BLOOD PRESSURE: 131 MMHG | OXYGEN SATURATION: 96 %

## 2022-05-20 LAB — COLONOSCOPY: NORMAL

## 2022-05-20 PROCEDURE — G0105 COLORECTAL SCRN; HI RISK IND: HCPCS | Performed by: SPECIALIST

## 2022-05-20 PROCEDURE — 250N000011 HC RX IP 250 OP 636: Performed by: NURSE ANESTHETIST, CERTIFIED REGISTERED

## 2022-05-20 PROCEDURE — 250N000009 HC RX 250: Performed by: NURSE ANESTHETIST, CERTIFIED REGISTERED

## 2022-05-20 PROCEDURE — 250N000009 HC RX 250: Performed by: SPECIALIST

## 2022-05-20 PROCEDURE — 258N000003 HC RX IP 258 OP 636: Performed by: NURSE ANESTHETIST, CERTIFIED REGISTERED

## 2022-05-20 PROCEDURE — 370N000017 HC ANESTHESIA TECHNICAL FEE, PER MIN: Performed by: SPECIALIST

## 2022-05-20 PROCEDURE — 45378 DIAGNOSTIC COLONOSCOPY: CPT | Performed by: SPECIALIST

## 2022-05-20 RX ORDER — LIDOCAINE 40 MG/G
CREAM TOPICAL
Status: DISCONTINUED | OUTPATIENT
Start: 2022-05-20 | End: 2022-05-20 | Stop reason: HOSPADM

## 2022-05-20 RX ORDER — PROPOFOL 10 MG/ML
INJECTION, EMULSION INTRAVENOUS CONTINUOUS PRN
Status: DISCONTINUED | OUTPATIENT
Start: 2022-05-20 | End: 2022-05-20

## 2022-05-20 RX ORDER — SODIUM CHLORIDE, SODIUM LACTATE, POTASSIUM CHLORIDE, CALCIUM CHLORIDE 600; 310; 30; 20 MG/100ML; MG/100ML; MG/100ML; MG/100ML
INJECTION, SOLUTION INTRAVENOUS CONTINUOUS
Status: DISCONTINUED | OUTPATIENT
Start: 2022-05-20 | End: 2022-05-20 | Stop reason: HOSPADM

## 2022-05-20 RX ORDER — PROPOFOL 10 MG/ML
INJECTION, EMULSION INTRAVENOUS PRN
Status: DISCONTINUED | OUTPATIENT
Start: 2022-05-20 | End: 2022-05-20

## 2022-05-20 RX ORDER — TRAMADOL HYDROCHLORIDE 50 MG/1
100 TABLET ORAL 3 TIMES DAILY PRN
Status: ON HOLD | COMMUNITY
Start: 2021-12-08 | End: 2022-09-20

## 2022-05-20 RX ORDER — LIDOCAINE HYDROCHLORIDE 20 MG/ML
INJECTION, SOLUTION INFILTRATION; PERINEURAL PRN
Status: DISCONTINUED | OUTPATIENT
Start: 2022-05-20 | End: 2022-05-20

## 2022-05-20 RX ADMIN — SODIUM CHLORIDE, POTASSIUM CHLORIDE, SODIUM LACTATE AND CALCIUM CHLORIDE: 600; 310; 30; 20 INJECTION, SOLUTION INTRAVENOUS at 14:24

## 2022-05-20 RX ADMIN — LIDOCAINE HYDROCHLORIDE 60 MG: 20 INJECTION, SOLUTION INFILTRATION; PERINEURAL at 14:54

## 2022-05-20 RX ADMIN — PROPOFOL 150 MCG/KG/MIN: 10 INJECTION, EMULSION INTRAVENOUS at 14:54

## 2022-05-20 RX ADMIN — PROPOFOL 100 MG: 10 INJECTION, EMULSION INTRAVENOUS at 14:54

## 2022-05-20 RX ADMIN — LIDOCAINE HYDROCHLORIDE 0.1 ML: 10 INJECTION, SOLUTION EPIDURAL; INFILTRATION; INTRACAUDAL; PERINEURAL at 14:24

## 2022-05-20 NOTE — ANESTHESIA PREPROCEDURE EVALUATION
Anesthesia Pre-Procedure Evaluation    Patient: Markie Rico   MRN: [id number] : 1960        Procedure : Procedure(s):  COLONOSCOPY          Past Medical History:   Diagnosis Date     Diverticulitis      Old myocardial infarction     s/p successful emergent revascualrization, PTCR/stent at the [facility] for acute MI     Old myocardial infarction 11    Non-Q-wave MI-[place]     Other and unspecified hyperlipidemia      Tobacco use disorder       Past Surgical History:   Procedure Laterality Date     ARTHROPLASTY HIP Right 2018    Procedure: ARTHROPLASTY HIP;  right total hip arthroplasty;  Surgeon: Vineet Bedolla MD;  Location:  OR     ZZC ANESTH,DX ARTHROSCOPIC PROC KNEE JOINT       ZZC COLOSTOMY       ZZC UNLISTED LAPAROSCOPY PROC,INTESTINE  2003    Exploratory Lap. Lysis of adhesions. Sigmoid colostomy takedown and stapled coloproctostomy.     ZZHC PTCA SINGLE VESSEL      2 stents      Allergies   Allergen Reactions     No Known Allergies       Social History     Tobacco Use     Smoking status: Former Smoker     Quit date: 2000     Years since quittin.3     Smokeless tobacco: Current User     Types: Chew   Substance Use Topics     Alcohol use: Yes     Comment: Occasional      Wt Readings from Last 1 Encounters:   20 131.5 kg (290 lb)        Anesthesia Evaluation   Pt has had prior anesthetic. Type: General.    No history of anesthetic complications       ROS/MED HX  ENT/Pulmonary:  - neg pulmonary ROS     Neurologic:  - neg neurologic ROS     Cardiovascular: Comment: - Coronary artery disease  - Old myocardial infarction     (+) hypertension--CAD ---Previous cardiac testing   Echo: Date: Results:    Stress Test: Date: Results:    ECG Reviewed: Date: 2018 Results:  - NSR    Cath: Date: Results:      METS/Exercise Tolerance:     Hematologic:       Musculoskeletal:       GI/Hepatic:     (+) GERD, Asymptomatic on medication,     Renal/Genitourinary:       Endo:     (+)  Obesity,     Psychiatric/Substance Use:  - neg psychiatric ROS     Infectious Disease:       Malignancy:       Other:            Physical Exam    Airway        Mallampati: II   TM distance: > 3 FB   Neck ROM: full   Mouth opening: > 3 cm    Respiratory Devices and Support         Dental  no notable dental history         Cardiovascular   cardiovascular exam normal          Pulmonary   pulmonary exam normal                OUTSIDE LABS:  CBC:   Lab Results   Component Value Date    WBC 12.8 (H) 04/14/2018    WBC 7.4 11/26/2012    HGB 13.4 04/14/2018    HGB 10.0 (L) 02/14/2018    HCT 40.8 04/14/2018    HCT 43.9 11/26/2012     04/14/2018     11/26/2012     BMP:   Lab Results   Component Value Date     04/14/2018     11/26/2012    POTASSIUM 3.7 04/14/2018    POTASSIUM 4.8 01/26/2018    CHLORIDE 107 04/14/2018    CHLORIDE 109 11/26/2012    CO2 25 04/14/2018    CO2 26 11/26/2012    BUN 10 04/14/2018    BUN 14 11/26/2012    CR 0.43 (L) 04/14/2018    CR 0.8 01/26/2018     (H) 04/14/2018     (H) 02/14/2018     COAGS:   Lab Results   Component Value Date    PTT 26 04/14/2018    INR 0.97 04/14/2018     POC: No results found for: BGM, HCG, HCGS  HEPATIC:   Lab Results   Component Value Date    ALBUMIN 3.7 04/14/2018    PROTTOTAL 7.3 04/14/2018    ALT 35 04/14/2018     (H) 04/14/2018    GGT 21 07/29/2003    ALKPHOS 121 04/14/2018    BILITOTAL 0.3 04/14/2018     OTHER:   Lab Results   Component Value Date    ASHLEY 8.8 04/14/2018    PHOS 4.2 07/29/2003    MAG 2.4 (H) 04/14/2018    TSH 2.48 11/26/2012       Anesthesia Plan    ASA Status:  2   NPO Status:  NPO Appropriate    Anesthesia Type: MAC.     - Reason for MAC: immobility needed   Induction: Propofol, Intravenous.   Maintenance: TIVA.        Consents    Anesthesia Plan(s) and associated risks, benefits, and realistic alternatives discussed. Questions answered and patient/representative(s) expressed understanding.     - Discussed:  Risks, Benefits and Alternatives for BOTH SEDATION and the PROCEDURE were discussed     - Discussed with:  Patient      - Extended Intubation/Ventilatory Support Discussed: No.      - Patient is DNR/DNI Status: No    Use of blood products discussed: No .     Postoperative Care            Comments:    Other Comments: The risks and benefits of anesthesia, and the alternatives where applicable, have been discussed with the patient, and they wish to proceed.               ANY Reyez CRNA

## 2022-05-20 NOTE — ANESTHESIA CARE TRANSFER NOTE
Patient: Markie Rico    Procedure: Procedure(s):  COLONOSCOPY       Diagnosis: History of colonic polyps [Z86.010]  Diagnosis Additional Information: No value filed.    Anesthesia Type:   MAC     Note:    Oropharynx: oropharynx clear of all foreign objects and spontaneously breathing  Level of Consciousness: awake  Oxygen Supplementation: room air    Independent Airway: airway patency satisfactory and stable  Dentition: dentition unchanged  Vital Signs Stable: post-procedure vital signs reviewed and stable  Report to RN Given: handoff report given  Patient transferred to: Phase II    Handoff Report: Identifed the Patient, Identified the Reponsible Provider, Reviewed the pertinent medical history, Discussed the surgical course, Reviewed Intra-OP anesthesia mangement and issues during anesthesia, Set expectations for post-procedure period and Allowed opportunity for questions and acknowledgement of understanding      Vitals:  Vitals Value Taken Time   /68 05/20/22 1518   Temp     Pulse 80 05/20/22 1518   Resp     SpO2 100 % 05/20/22 1525   Vitals shown include unvalidated device data.    Electronically Signed By: ANY Reyez CRNA  May 20, 2022  3:26 PM

## 2022-05-20 NOTE — ANESTHESIA POSTPROCEDURE EVALUATION
Patient: Markie Rico    Procedure: Procedure(s):  COLONOSCOPY       Anesthesia Type:  MAC    Note:  Disposition: Outpatient   Postop Pain Control: Uneventful            Sign Out: Well controlled pain   PONV: No   Neuro/Psych: Uneventful            Sign Out: Acceptable/Baseline neuro status   Airway/Respiratory: Uneventful            Sign Out: Acceptable/Baseline resp. status   CV/Hemodynamics: Uneventful            Sign Out: Acceptable CV status   Other NRE: NONE   DID A NON-ROUTINE EVENT OCCUR? No    Event details/Postop Comments:  Pt was happy with anesthesia care.  No complications.  I will follow up with the pt if needed.           Last vitals:  Vitals Value Taken Time   /68 05/20/22 1518   Temp     Pulse 80 05/20/22 1518   Resp     SpO2 100 % 05/20/22 1525   Vitals shown include unvalidated device data.    Electronically Signed By: ANY Reyez CRNA  May 20, 2022  3:27 PM

## 2022-05-20 NOTE — DISCHARGE INSTRUCTIONS
[facility]    Home Care Following Endoscopy          Activity:  You have just undergone an endoscopic procedure usually performed with conscious sedation.  Do not work or operate machinery (including a car) for at least 12 hours.    I encourage you to walk and attempt to pass this air as soon as possible.    Diet:  Return to the diet you were on before your procedure but eat lightly for the first 12-24 hours.  Drink plenty of water.  Resume any regular medications unless otherwise advised by your physician.  Please begin any new medication prescribed as a result of your procedure as directed by your physician.   Pain:  You may take Tylenol as needed for pain.  Expected Recovery:  You can expect some mild abdominal fullness and/or discomfort due to the air used to inflate your intestinal tract.     Call Your Physician if You Have:    After Colonoscopy:  Worsening persisting abdominal pain which is worse with activity.  Fevers (>101 degrees F), chills or shakes.  Passage of continued blood with bowel movements.     Any questions or concerns about your recovery, please call [phone] or after hours [phone] Nurse Advice Line.

## 2022-07-26 ENCOUNTER — OFFICE VISIT (OUTPATIENT)
Dept: ORTHOPEDICS | Facility: CLINIC | Age: 62
End: 2022-07-26
Payer: COMMERCIAL

## 2022-07-26 VITALS
SYSTOLIC BLOOD PRESSURE: 155 MMHG | HEIGHT: 70 IN | BODY MASS INDEX: 38.08 KG/M2 | WEIGHT: 266 LBS | DIASTOLIC BLOOD PRESSURE: 80 MMHG

## 2022-07-26 DIAGNOSIS — Z96.641 STATUS POST TOTAL REPLACEMENT OF RIGHT HIP: ICD-10-CM

## 2022-07-26 DIAGNOSIS — M16.12 PRIMARY OSTEOARTHRITIS OF LEFT HIP: Primary | ICD-10-CM

## 2022-07-26 PROCEDURE — 99214 OFFICE O/P EST MOD 30 MIN: CPT | Performed by: ORTHOPAEDIC SURGERY

## 2022-07-26 NOTE — PROGRESS NOTES
Markie Rico is a 61 year old male who is seen in follow-up for left hip pain.  He has had pain in both hips for many years.  Right hip was replaced on 2018 by Dr. Bedolla.  He has felt like the left hip needs to be replaced for a while, but has been trying to lose weight on Dr. Najera's advice.  His weight is down from 320 pounds to 266.  He has severe pain in the left hip with standing or walking.  He rates his pain at 4 out of 10 all the time.  He uses 2 canes to walk.  He has had steroid injections on the left.    X-ray is seen in PACS from 22.  This shows extremely severe left hip osteoarthritis.  Surprisingly leg length is about equal.    Past Medical History:   Diagnosis Date     Diverticulitis      Old myocardial infarction     s/p successful emergent revascualrization, PTCR/stent at the [facility] for acute MI     Old myocardial infarction 11    Non-Q-wave MI-[place]     Other and unspecified hyperlipidemia      Tobacco use disorder        Past Surgical History:   Procedure Laterality Date     ARTHROPLASTY HIP Right 2018    Procedure: ARTHROPLASTY HIP;  right total hip arthroplasty;  Surgeon: Vineet Bedolla MD;  Location: [facility]     COLONOSCOPY N/A 2022    Procedure: COLONOSCOPY;  Surgeon: Mundo Velazquez MD;  Location: [facility] GI     ZZC ANESTH,DX ARTHROSCOPIC PROC KNEE JOINT       ZZC COLOSTOMY       ZZC UNLISTED LAPAROSCOPY PROC,INTESTINE  2003    Exploratory Lap. Lysis of adhesions. Sigmoid colostomy takedown and stapled coloproctostomy.     ZZHC PTCA SINGLE VESSEL      2 stents       Family History   Problem Relation Age of Onset     Lipids Mother      Psychotic Disorder Mother         anxiety     Hypertension Mother      Prostate Cancer Father          at 74     Diabetes Sister        Social History     Socioeconomic History     Marital status:      Spouse name: Farida     Number of children: 3     Years of education: 12     Highest education level: Not on file    Occupational History     Occupation: [employer]   Tobacco Use     Smoking status: Former Smoker     Quit date: 2000     Years since quittin.5     Smokeless tobacco: Current User     Types: Chew   Substance and Sexual Activity     Alcohol use: Yes     Comment: Occasional     Drug use: No     Sexual activity: Never     Partners: Female     Birth control/protection: Surgical   Other Topics Concern      Service Yes     Comment: [employer]     Blood Transfusions No     Caffeine Concern No     Comment: Hasn't had caffeine for 2 weeks     Occupational Exposure No     Hobby Hazards No     Sleep Concern No     Stress Concern No     Weight Concern No     Special Diet Yes     Comment: Low Carb / High Protein     Back Care No     Exercise Yes     Comment: Walks     Bike Helmet Not Asked     Comment: NA     Seat Belt Yes     Self-Exams Not Asked   Social History Narrative     Not on file     Social Determinants of Health     Financial Resource Strain: Not on file   Food Insecurity: Not on file   Transportation Needs: Not on file   Physical Activity: Not on file   Stress: Not on file   Social Connections: Not on file   Intimate Partner Violence: Not on file   Housing Stability: Not on file       Current Outpatient Medications   Medication Sig Dispense Refill     acetaminophen (TYLENOL) 325 MG tablet Take 2 tablets (650 mg) by mouth every 4 hours as needed for other (multimodal surgical pain management along with NSAIDS and opioid medication as indicated based on pain control and physical function.) 100 tablet 0     Capsicum, Cayenne, (CAYENNE PO)        Cinnamon Bark POWD        clopidogrel (PLAVIX) 75 MG tablet Take 75 mg by mouth       lisinopril (PRINIVIL,ZESTRIL) 10 MG tablet Take 1 tablet by mouth daily. Take 10 mg by mouth daily. 90 tablet 3     MAGNESIUM OXIDE PO Take 400 mg by mouth daily        metoprolol (TOPROL XL) 50 MG 24 hr tablet Take 1 tablet by mouth daily. (Patient taking differently: Take  "25 mg by mouth 2 times daily) 90 tablet 3     Multiple Vitamins-Minerals (CENTRUM MEN PO) Take 1 tablet by mouth daily       NIACIN, ANTIHYPERLIPIDEMIC, PO Take 500 mg by mouth At Bedtime        nitroglycerin (NITROSTAT) 0.4 MG SL tablet Place 1 tablet under the tongue every 5 minutes as needed. (Patient not taking: Reported on 6/20/2019) 15 tablet 3     order for DME Equipment being ordered: Walker ()  Treatment Diagnosis: s/p joint replacement 1 Device 0     POTASSIUM CHLORIDE CR PO Take 10 mEq by mouth daily.       rosuvastatin (CRESTOR) 40 MG tablet Take 40 mg by mouth       traMADol (ULTRAM) 50 MG tablet Take 100 mg by mouth       TURMERIC PO          Allergies   Allergen Reactions     No Known Allergies        REVIEW OF SYSTEMS:  CONSTITUTIONAL:  NEGATIVE for fever, chills, change in weight, not feeling tired  SKIN:  NEGATIVE for worrisome rashes, no skin lumps, no skin ulcers and no non-healing wounds  EYES:  NEGATIVE for vision changes or irritation.  ENT/MOUTH:  NEGATIVE.  No hearing loss, no hoarseness, no difficulty swallowing.  RESP:  NEGATIVE. No cough or shortness of breath.  CV:  NEGATIVE for chest pain, palpitations or peripheral edema  GI:  NEGATIVE for nausea, abdominal pain, heartburn, or change in bowel habits  :  Negative. No dysuria, no hematuria  MUSCULOSKELETAL:  See HPI above  NEURO:  NEGATIVE . No headaches, no dizziness,  no numbness  ENDOCRINE:  NEGATIVE for temperature intolerance, skin/hair changes  HEME/ALLERGY/IMMUNE:  NEGATIVE for bleeding problems  PSYCHIATRIC:  NEGATIVE. no anxiety, no depression.     Exam:  Vitals: BP (!) 155/80   Ht 1.778 m (5' 10\")   Wt 120.7 kg (266 lb)   BMI 38.17 kg/m    BMI= Body mass index is 38.17 kg/m .  Constitutional:  healthy, alert and no distress  Neuro: Alert and Oriented x 3, no focal defects.  Psych: Affect normal   Respiratory: Breathing not labored.  Cardiovascular: normal peripheral pulses  Lymph: no adenopathy  Skin: No " rashes,worrisome lesions or skin problems  He has rotation of the right hip to about 30 degrees external rotation 20 degrees internal rotation.  On the left hip he has 5 to 10 degrees external rotation with 0 degrees internal rotation.  He also has about a 30 degree flexion contracture on the left hip.  His gait shows obvious abnormality with a hip flexion contracture on the left hip.  Sensation, motor and circulation are intact.    Assessment:  Severe left hip osteoarthritis.  He needs total hip arthroplasty.  He has lost 50+ pounds.  I have available in December.  He would like it much before that.  I will have appointment made with Dr. Talita Briseno.  He should probably get a new low AP pelvis x-ray supine when he returns to clinic.

## 2022-07-26 NOTE — LETTER
7/26/2022         RE: Markie Rico  [address] [place] [id number]        Dear Colleague,    Thank you for referring your patient, Markie Rico, to the [facility]. Please see a copy of my visit note below.    Markie Rico is a 61 year old male who is seen in follow-up for left hip pain.  He has had pain in both hips for many years.  Right hip was replaced on 2/12/2018 by Dr. Bedolla.  He has felt like the left hip needs to be replaced for a while, but has been trying to lose weight on Dr. Najera's advice.  His weight is down from 320 pounds to 266.  He has severe pain in the left hip with standing or walking.  He rates his pain at 4 out of 10 all the time.  He uses 2 canes to walk.  He has had steroid injections on the left.    X-ray is seen in PACS from 5/31/22.  This shows extremely severe left hip osteoarthritis.  Surprisingly leg length is about equal.    Past Medical History:   Diagnosis Date     Diverticulitis      Old myocardial infarction     s/p successful emergent revascualrization, PTCR/stent at the [facility] for acute MI     Old myocardial infarction 06/07/11    Non-Q-wave MI-[place]     Other and unspecified hyperlipidemia      Tobacco use disorder        Past Surgical History:   Procedure Laterality Date     ARTHROPLASTY HIP Right 2/12/2018    Procedure: ARTHROPLASTY HIP;  right total hip arthroplasty;  Surgeon: Vineet Bedolla MD;  Location: [facility] OR     COLONOSCOPY N/A 5/20/2022    Procedure: COLONOSCOPY;  Surgeon: Mundo Velazquez MD;  Location:  GI     ZZC ANESTH,DX ARTHROSCOPIC PROC KNEE JOINT       ZZC COLOSTOMY       ZZC UNLISTED LAPAROSCOPY PROC,INTESTINE  7/25/2003    Exploratory Lap. Lysis of adhesions. Sigmoid colostomy takedown and stapled coloproctostomy.     ZZHC PTCA SINGLE VESSEL      2 stents       Family History   Problem Relation Age of Onset     Lipids Mother      Psychotic Disorder Mother         anxiety     Hypertension Mother      Prostate  Cancer Father          at 74     Diabetes Sister        Social History     Socioeconomic History     Marital status:      Spouse name: Farida     Number of children: 3     Years of education: 12     Highest education level: Not on file   Occupational History     Occupation: [employer]   Tobacco Use     Smoking status: Former Smoker     Quit date: 2000     Years since quittin.5     Smokeless tobacco: Current User     Types: Chew   Substance and Sexual Activity     Alcohol use: Yes     Comment: Occasional     Drug use: No     Sexual activity: Never     Partners: Female     Birth control/protection: Surgical   Other Topics Concern      Service Yes     Comment: Marine     Blood Transfusions No     Caffeine Concern No     Comment: Hasn't had caffeine for 2 weeks     Occupational Exposure No     Hobby Hazards No     Sleep Concern No     Stress Concern No     Weight Concern No     Special Diet Yes     Comment: Low Carb / High Protein     Back Care No     Exercise Yes     Comment: Walks     Bike Helmet Not Asked     Comment: NA     Seat Belt Yes     Self-Exams Not Asked   Social History Narrative     Not on file     Social Determinants of Health     Financial Resource Strain: Not on file   Food Insecurity: Not on file   Transportation Needs: Not on file   Physical Activity: Not on file   Stress: Not on file   Social Connections: Not on file   Intimate Partner Violence: Not on file   Housing Stability: Not on file       Current Outpatient Medications   Medication Sig Dispense Refill     acetaminophen (TYLENOL) 325 MG tablet Take 2 tablets (650 mg) by mouth every 4 hours as needed for other (multimodal surgical pain management along with NSAIDS and opioid medication as indicated based on pain control and physical function.) 100 tablet 0     Capsicum, Cayenne, (CAYENNE PO)        Cinnamon Bark POWD        clopidogrel (PLAVIX) 75 MG tablet Take 75 mg by mouth       lisinopril (PRINIVIL,ZESTRIL)  "10 MG tablet Take 1 tablet by mouth daily. Take 10 mg by mouth daily. 90 tablet 3     MAGNESIUM OXIDE PO Take 400 mg by mouth daily        metoprolol (TOPROL XL) 50 MG 24 hr tablet Take 1 tablet by mouth daily. (Patient taking differently: Take 25 mg by mouth 2 times daily) 90 tablet 3     Multiple Vitamins-Minerals (CENTRUM MEN PO) Take 1 tablet by mouth daily       NIACIN, ANTIHYPERLIPIDEMIC, PO Take 500 mg by mouth At Bedtime        nitroglycerin (NITROSTAT) 0.4 MG SL tablet Place 1 tablet under the tongue every 5 minutes as needed. (Patient not taking: Reported on 6/20/2019) 15 tablet 3     order for DME Equipment being ordered: Walker ()  Treatment Diagnosis: s/p joint replacement 1 Device 0     POTASSIUM CHLORIDE CR PO Take 10 mEq by mouth daily.       rosuvastatin (CRESTOR) 40 MG tablet Take 40 mg by mouth       traMADol (ULTRAM) 50 MG tablet Take 100 mg by mouth       TURMERIC PO          Allergies   Allergen Reactions     No Known Allergies        REVIEW OF SYSTEMS:  CONSTITUTIONAL:  NEGATIVE for fever, chills, change in weight, not feeling tired  SKIN:  NEGATIVE for worrisome rashes, no skin lumps, no skin ulcers and no non-healing wounds  EYES:  NEGATIVE for vision changes or irritation.  ENT/MOUTH:  NEGATIVE.  No hearing loss, no hoarseness, no difficulty swallowing.  RESP:  NEGATIVE. No cough or shortness of breath.  CV:  NEGATIVE for chest pain, palpitations or peripheral edema  GI:  NEGATIVE for nausea, abdominal pain, heartburn, or change in bowel habits  :  Negative. No dysuria, no hematuria  MUSCULOSKELETAL:  See HPI above  NEURO:  NEGATIVE . No headaches, no dizziness,  no numbness  ENDOCRINE:  NEGATIVE for temperature intolerance, skin/hair changes  HEME/ALLERGY/IMMUNE:  NEGATIVE for bleeding problems  PSYCHIATRIC:  NEGATIVE. no anxiety, no depression.     Exam:  Vitals: BP (!) 155/80   Ht 1.778 m (5' 10\")   Wt 120.7 kg (266 lb)   BMI 38.17 kg/m    BMI= Body mass index is 38.17 " kg/m .  Constitutional:  healthy, alert and no distress  Neuro: Alert and Oriented x 3, no focal defects.  Psych: Affect normal   Respiratory: Breathing not labored.  Cardiovascular: normal peripheral pulses  Lymph: no adenopathy  Skin: No rashes,worrisome lesions or skin problems  He has rotation of the right hip to about 30 degrees external rotation 20 degrees internal rotation.  On the left hip he has 5 to 10 degrees external rotation with 0 degrees internal rotation.  He also has about a 30 degree flexion contracture on the left hip.  His gait shows obvious abnormality with a hip flexion contracture on the left hip.  Sensation, motor and circulation are intact.    Assessment:  Severe left hip osteoarthritis.  He needs total hip arthroplasty.  He has lost 50+ pounds.  I have available in December.  He would like it much before that.  I will have appointment made with Dr. Talita Briseno.  He should probably get a new low AP pelvis x-ray supine when he returns to clinic.      Again, thank you for allowing me to participate in the care of your patient.        Sincerely,        Efren Cleaning MD

## 2022-08-04 ENCOUNTER — OFFICE VISIT (OUTPATIENT)
Dept: ORTHOPEDICS | Facility: CLINIC | Age: 62
End: 2022-08-04
Payer: COMMERCIAL

## 2022-08-04 ENCOUNTER — ANCILLARY PROCEDURE (OUTPATIENT)
Dept: GENERAL RADIOLOGY | Facility: CLINIC | Age: 62
End: 2022-08-04
Attending: ORTHOPAEDIC SURGERY

## 2022-08-04 VITALS
WEIGHT: 266 LBS | SYSTOLIC BLOOD PRESSURE: 159 MMHG | BODY MASS INDEX: 38.08 KG/M2 | HEART RATE: 72 BPM | HEIGHT: 70 IN | DIASTOLIC BLOOD PRESSURE: 62 MMHG | OXYGEN SATURATION: 96 %

## 2022-08-04 DIAGNOSIS — Z01.818 PRE-OP TESTING: ICD-10-CM

## 2022-08-04 DIAGNOSIS — M16.12 PRIMARY OSTEOARTHRITIS OF LEFT HIP: Primary | ICD-10-CM

## 2022-08-04 DIAGNOSIS — M16.12 OSTEOARTHRITIS OF LEFT HIP: ICD-10-CM

## 2022-08-04 PROCEDURE — 73502 X-RAY EXAM HIP UNI 2-3 VIEWS: CPT | Mod: TC | Performed by: RADIOLOGY

## 2022-08-04 PROCEDURE — 99214 OFFICE O/P EST MOD 30 MIN: CPT | Performed by: ORTHOPAEDIC SURGERY

## 2022-08-04 ASSESSMENT — PAIN SCALES - GENERAL: PAINLEVEL: MILD PAIN (3)

## 2022-08-04 NOTE — LETTER
8/4/2022         RE: Markie Rico  [address] [facility] [id number]        Dear Colleague,    Thank you for referring your patient, Markie Rico, to the [facility]. Please see a copy of my visit note below.    SUBJECTIVE:   Markie Rico is a 61 year old male who is seen in consultation at the request of Dr. Cleaning for evaluation of left hip pain chronically.  Years.  History of right total hip arthroplasty 3 years ago with Dr. Bedolla  Was told to lose weight by Dr. Najera before doing left hip.  Has lost 60 pounds.  He wants his left hip done ASAP.    Present symptoms: pain with walking, night pain  Uses canes x 2  Can't independently put weight on left side.   He is very debilitated    Treatments tried to this point: history of corticosteroid injection in the hip. Worked temporarily. Years ago.    Review of Systems:  Constitutional:  NEGATIVE for fever, chills, change in weight  Integumentary/Skin:  NEGATIVE for worrisome rashes, moles or lesions  Eyes:  NEGATIVE for vision changes or irritation  ENT/Mouth:  NEGATIVE for ear, mouth and throat problems  Resp:  NEGATIVE for significant cough or SOB  Breast:  NEGATIVE for masses, tenderness or discharge  CV:  NEGATIVE for chest pain, palpitations or peripheral edema  GI:  NEGATIVE for nausea, abdominal pain, heartburn, or change in bowel habits  :  Negative   Musculoskeletal:  See HPI above  Neuro:  NEGATIVE for weakness, dizziness or paresthesias  Endocrine:  NEGATIVE for temperature intolerance, skin/hair changes  Heme/allergy/immune:  NEGATIVE for bleeding problems  Psychiatric:  NEGATIVE for changes in mood or affect    Past Medical History:   Past Medical History:   Diagnosis Date     Diverticulitis      Old myocardial infarction     s/p successful emergent revascualrization, PTCR/stent at the [facility] for acute MI     Old myocardial infarction 06/07/11    Non-Q-wave MI-[place]     Other and unspecified hyperlipidemia       "Tobacco use disorder      Past Surgical History:   Past Surgical History:   Procedure Laterality Date     ARTHROPLASTY HIP Right 2018    Procedure: ARTHROPLASTY HIP;  right total hip arthroplasty;  Surgeon: Vineet Bedolla MD;  [facility]     COLONOSCOPY N/A 2022    Procedure: COLONOSCOPY;  Surgeon: Mundo Velazquez MD;  Location: [facility]     ZZC ANESTH,DX ARTHROSCOPIC PROC KNEE JOINT       ZZC COLOSTOMY       ZZC UNLISTED LAPAROSCOPY PROC,INTESTINE  2003    Exploratory Lap. Lysis of adhesions. Sigmoid colostomy takedown and stapled coloproctostomy.     ZZHC PTCA SINGLE VESSEL      2 stents     Family History:   Family History   Problem Relation Age of Onset     Lipids Mother      Psychotic Disorder Mother         anxiety     Hypertension Mother      Prostate Cancer Father          at 74     Diabetes Sister      Social History:   Social History     Tobacco Use     Smoking status: Former Smoker     Quit date: 2000     Years since quittin.6     Smokeless tobacco: Former User   Substance Use Topics     Alcohol use: Not Currently     Comment: Occasional     OBJECTIVE:  Physical Exam:  BP (!) 159/62 (BP Location: Left arm, Patient Position: Sitting, Cuff Size: Adult Regular)   Pulse 72   Ht 1.778 m (5' 10\")   Wt 120.7 kg (266 lb)   SpO2 96%   BMI 38.17 kg/m    General Appearance: healthy, alert and no distress   Skin: no suspicious lesions or rashes  Neuro: Normal strength and tone, mentation intact and speech normal  Vascular: good pulses, and cappillary refill   Lymph: no lymphadenopathy   Psych:  mentation appears normal and affect normal/bright  Resp: no increased work of breathing     Left Hip Exam:    Tender: non-tender   Hip range of motion:   Almost no range of motion with a 30 degree flexion contracture      Leg lengths: not tested    Sensation:normal  Motor: all normal      X-rays:  Obtained today of the pelvis, and lateral hip reviewed in the office with the patient by " myself today and show severe osteoarthritis with deformation of the femoral head, and a massive central osteophyte.  I don't think there is underlying dysplasia.       ASSESSMENT:   Severe, end stage hypertrophic osteoarthritis left hip     PLAN:   Total Hip Arthroplasty:   We talked about the patient's condition and diagnosis.  Because of severe arthritis, and failure of conservative measures, I have suggested left total hip arthroplasty.  I've talked in depth about the procedure and the risks, which include, but are not limited to blood loss requiring transfusion, infection, neurovascular injury, DVT, PE, pain, (both perioperative and persistent post-recovery pain), dislocation, leg length discrepancy, intra-operative fracture, potential anesthetic and perioperative medical complications, and the possibility of needing additional procedures. We also talked about recovery time, which differs from patient to patient.  We've encouraged attendance at the arthroplasty class at the hospital.  Medical clearance will need to be obtained.      The overall risk for thromboembolic events in this patient is low and ASA will be used for postoperative prophylaxis.    Other special considerations:  Extra time will be needed for this procedure  Match implants with [place]: other side was 60mm Tritanium cup, Accolade 2 stem size 6, 36 head, X3 liner.  Low neck cut  CT scan to assess the acetabulum was ordered.  Use 2016 xrays to template:       vs today's xrays:        PEPE Briseno MD  Dept. Orthopedic Surgery  [facility]         Again, thank you for allowing me to participate in the care of your patient.        Sincerely,        Vineet Briseno MD

## 2022-08-04 NOTE — PROGRESS NOTES
SUBJECTIVE:   Markie Rico is a 61 year old male who is seen in consultation at the request of Dr. Cleaning for evaluation of left hip pain chronically.  Years.  History of right total hip arthroplasty 3 years ago with Dr. Bedolla  Was told to lose weight by Dr. Najera before doing left hip.  Has lost 60 pounds.  He wants his left hip done ASAP.    Present symptoms: pain with walking, night pain  Uses canes x 2  Can't independently put weight on left side.   He is very debilitated    Treatments tried to this point: history of corticosteroid injection in the hip. Worked temporarily. Years ago.    Review of Systems:  Constitutional:  NEGATIVE for fever, chills, change in weight  Integumentary/Skin:  NEGATIVE for worrisome rashes, moles or lesions  Eyes:  NEGATIVE for vision changes or irritation  ENT/Mouth:  NEGATIVE for ear, mouth and throat problems  Resp:  NEGATIVE for significant cough or SOB  Breast:  NEGATIVE for masses, tenderness or discharge  CV:  NEGATIVE for chest pain, palpitations or peripheral edema  GI:  NEGATIVE for nausea, abdominal pain, heartburn, or change in bowel habits  :  Negative   Musculoskeletal:  See HPI above  Neuro:  NEGATIVE for weakness, dizziness or paresthesias  Endocrine:  NEGATIVE for temperature intolerance, skin/hair changes  Heme/allergy/immune:  NEGATIVE for bleeding problems  Psychiatric:  NEGATIVE for changes in mood or affect    Past Medical History:   Past Medical History:   Diagnosis Date     Diverticulitis      Old myocardial infarction     s/p successful emergent revascualrization, PTCR/stent at the [facility] for acute MI     Old myocardial infarction 06/07/11    Non-Q-wave MI-[place]     Other and unspecified hyperlipidemia      Tobacco use disorder      Past Surgical History:   Past Surgical History:   Procedure Laterality Date     ARTHROPLASTY HIP Right 2/12/2018    Procedure: ARTHROPLASTY HIP;  right total hip arthroplasty;  Surgeon: Vineet Bedolla MD;  Location:   "OR     COLONOSCOPY N/A 2022    Procedure: COLONOSCOPY;  Surgeon: Mundo Velazquez MD;  [facility]     ZZC ANESTH,DX ARTHROSCOPIC PROC KNEE JOINT       ZZC COLOSTOMY       Z UNLISTED LAPAROSCOPY PROC,INTESTINE  2003    Exploratory Lap. Lysis of adhesions. Sigmoid colostomy takedown and stapled coloproctostomy.     ZZ PTCA SINGLE VESSEL      2 stents     Family History:   Family History   Problem Relation Age of Onset     Lipids Mother      Psychotic Disorder Mother         anxiety     Hypertension Mother      Prostate Cancer Father          at 74     Diabetes Sister      Social History:   Social History     Tobacco Use     Smoking status: Former Smoker     Quit date: 2000     Years since quittin.6     Smokeless tobacco: Former User   Substance Use Topics     Alcohol use: Not Currently     Comment: Occasional     OBJECTIVE:  Physical Exam:  BP (!) 159/62 (BP Location: Left arm, Patient Position: Sitting, Cuff Size: Adult Regular)   Pulse 72   Ht 1.778 m (5' 10\")   Wt 120.7 kg (266 lb)   SpO2 96%   BMI 38.17 kg/m    General Appearance: healthy, alert and no distress   Skin: no suspicious lesions or rashes  Neuro: Normal strength and tone, mentation intact and speech normal  Vascular: good pulses, and cappillary refill   Lymph: no lymphadenopathy   Psych:  mentation appears normal and affect normal/bright  Resp: no increased work of breathing     Left Hip Exam:    Tender: non-tender   Hip range of motion:   Almost no range of motion with a 30 degree flexion contracture      Leg lengths: not tested    Sensation:normal  Motor: all normal      X-rays:  Obtained today of the pelvis, and lateral hip reviewed in the office with the patient by myself today and show severe osteoarthritis with deformation of the femoral head, and a massive central osteophyte.  I don't think there is underlying dysplasia.       ASSESSMENT:   Severe, end stage hypertrophic osteoarthritis left hip     PLAN: "   Total Hip Arthroplasty:   We talked about the patient's condition and diagnosis.  Because of severe arthritis, and failure of conservative measures, I have suggested left total hip arthroplasty.  I've talked in depth about the procedure and the risks, which include, but are not limited to blood loss requiring transfusion, infection, neurovascular injury, DVT, PE, pain, (both perioperative and persistent post-recovery pain), dislocation, leg length discrepancy, intra-operative fracture, potential anesthetic and perioperative medical complications, and the possibility of needing additional procedures. We also talked about recovery time, which differs from patient to patient.  We've encouraged attendance at the arthroplasty class at the hospital.  Medical clearance will need to be obtained.      The overall risk for thromboembolic events in this patient is low and ASA will be used for postoperative prophylaxis.    Other special considerations:  Extra time will be needed for this procedure  Match implants with [place]: other side was 60mm Tritanium cup, Accolade 2 stem size 6, 36 head, X3 liner.  Low neck cut  CT scan to assess the acetabulum was ordered.  Use 2016 xrays to template:       vs today's xrays:        PEPE Briseno MD  Dept. Orthopedic Surgery  [facility]

## 2022-08-09 ENCOUNTER — PREP FOR PROCEDURE (OUTPATIENT)
Dept: ORTHOPEDICS | Facility: CLINIC | Age: 62
End: 2022-08-09

## 2022-08-09 ENCOUNTER — TELEPHONE (OUTPATIENT)
Dept: ORTHOPEDICS | Facility: CLINIC | Age: 62
End: 2022-08-09

## 2022-08-09 DIAGNOSIS — M16.12 PRIMARY OSTEOARTHRITIS OF LEFT HIP: Primary | ICD-10-CM

## 2022-08-10 ENCOUNTER — HOSPITAL ENCOUNTER (OUTPATIENT)
Dept: CT IMAGING | Facility: CLINIC | Age: 62
Discharge: HOME | End: 2022-08-10
Attending: ORTHOPAEDIC SURGERY | Admitting: ORTHOPAEDIC SURGERY
Payer: COMMERCIAL

## 2022-08-10 DIAGNOSIS — M16.12 PRIMARY OSTEOARTHRITIS OF LEFT HIP: ICD-10-CM

## 2022-08-10 PROCEDURE — 73700 CT LOWER EXTREMITY W/O DYE: CPT | Mod: LT

## 2022-08-10 NOTE — TELEPHONE ENCOUNTER
Type of surgery: LEFT TOTAL HIP ARTHROPLASTY  Location of surgery: [facility]  Date and time of surgery: 9/12  Surgeon: Finn  Pre-Op Appt Date: [facility], pt to schedule  Post-Op Appt Date: 9/22  Packet sent out: Yes  Pre-cert/Authorization completed:  Not Applicable  Date: na

## 2022-08-10 NOTE — PROGRESS NOTES
Appropriate assistive devices provided during their visit. yes (Yes, No, N/A) cane (list device)    Exam table and/or cart  placed in the lowest position. yes (Yes, No, N/A)    Brakes on tables/carts/wheelchairs used at all times. yes (Yes, No, N/A)    Non slip footwear applied. na (Yes, No, NA)    Patient was accompanied by staff throughout visit. na (Yes, No, N/A)    Equipment safety straps used. na (Yes, No, N/A)    Assist with toileting. na (Yes, No, N/A)

## 2022-08-19 ENCOUNTER — TELEPHONE (OUTPATIENT)
Dept: PODIATRY | Facility: CLINIC | Age: 62
End: 2022-08-19

## 2022-08-19 NOTE — TELEPHONE ENCOUNTER
[facility] Call Center    Phone Message    May a detailed message be left on voicemail: yes     Reason for Call: V A wants information faxed to Them about Pre op Physical . Please send info to [phone] attn Pat . Call Patient for Questions   Action Taken: Message routed to:  Other: AN ORTHOPEDIC SURGERY    Travel Screening: Not Applicable

## 2022-08-23 NOTE — TELEPHONE ENCOUNTER
Printed and faxed over notes for pre op physical. Called pt and informed.  April St Noreen CMA 8/23/2022 8:24 AM

## 2022-09-02 ENCOUNTER — TRANSFERRED RECORDS (OUTPATIENT)
Dept: HEALTH INFORMATION MANAGEMENT | Facility: CLINIC | Age: 62
End: 2022-09-02

## 2022-09-02 LAB
ALT SERPL-CCNC: 21 U/L (ref 0–55)
AST SERPL-CCNC: 16 U/L (ref 5–40)
CREATININE (EXTERNAL): 0.8 MG/DL (ref 0.5–1.5)
GFR ESTIMATED (EXTERNAL): >90 ML/MIN/1.73
GLUCOSE (EXTERNAL): 97 MG/DL (ref 70–100)
HBA1C MFR BLD: 5.5 % (ref 4–6)
POTASSIUM (EXTERNAL): 4.5 MMOL/L (ref 3.5–5)

## 2022-09-06 ENCOUNTER — TELEPHONE (OUTPATIENT)
Dept: ORTHOPEDICS | Facility: CLINIC | Age: 62
End: 2022-09-06

## 2022-09-06 RX ORDER — CLOPIDOGREL BISULFATE 75 MG/1
1 TABLET ORAL DAILY
Status: ON HOLD | COMMUNITY
Start: 2021-11-29 | End: 2022-09-20

## 2022-09-09 ENCOUNTER — LAB (OUTPATIENT)
Dept: LAB | Facility: CLINIC | Age: 62
End: 2022-09-09

## 2022-09-09 DIAGNOSIS — Z01.818 PRE-OP TESTING: ICD-10-CM

## 2022-09-09 PROCEDURE — U0003 INFECTIOUS AGENT DETECTION BY NUCLEIC ACID (DNA OR RNA); SEVERE ACUTE RESPIRATORY SYNDROME CORONAVIRUS 2 (SARS-COV-2) (CORONAVIRUS DISEASE [COVID-19]), AMPLIFIED PROBE TECHNIQUE, MAKING USE OF HIGH THROUGHPUT TECHNOLOGIES AS DESCRIBED BY CMS-2020-01-R: HCPCS

## 2022-09-09 PROCEDURE — U0005 INFEC AGEN DETEC AMPLI PROBE: HCPCS

## 2022-09-10 LAB — SARS-COV-2 RNA RESP QL NAA+PROBE: NEGATIVE

## 2022-09-12 ENCOUNTER — HOSPITAL ENCOUNTER (OUTPATIENT)
Facility: CLINIC | Age: 62
Discharge: OTHER INSTITUTION | End: 2022-09-12
Attending: ORTHOPAEDIC SURGERY | Admitting: ORTHOPAEDIC SURGERY
Payer: COMMERCIAL

## 2022-09-12 ENCOUNTER — ANESTHESIA (OUTPATIENT)
Dept: SURGERY | Facility: CLINIC | Age: 62
End: 2022-09-12
Payer: COMMERCIAL

## 2022-09-12 ENCOUNTER — APPOINTMENT (OUTPATIENT)
Dept: CT IMAGING | Facility: CLINIC | Age: 62
End: 2022-09-12
Attending: EMERGENCY MEDICINE
Payer: COMMERCIAL

## 2022-09-12 ENCOUNTER — APPOINTMENT (OUTPATIENT)
Dept: GENERAL RADIOLOGY | Facility: CLINIC | Age: 62
End: 2022-09-12
Attending: ORTHOPAEDIC SURGERY
Payer: COMMERCIAL

## 2022-09-12 ENCOUNTER — ANESTHESIA EVENT (OUTPATIENT)
Dept: SURGERY | Facility: CLINIC | Age: 62
End: 2022-09-12
Payer: COMMERCIAL

## 2022-09-12 ENCOUNTER — HOSPITAL ENCOUNTER (INPATIENT)
Facility: CLINIC | Age: 62
LOS: 8 days | Discharge: HOME | DRG: 250 | End: 2022-09-20
Attending: EMERGENCY MEDICINE | Admitting: HOSPITALIST
Payer: COMMERCIAL

## 2022-09-12 VITALS
TEMPERATURE: 98.2 F | BODY MASS INDEX: 38.17 KG/M2 | SYSTOLIC BLOOD PRESSURE: 126 MMHG | HEART RATE: 79 BPM | DIASTOLIC BLOOD PRESSURE: 69 MMHG | WEIGHT: 266 LBS | OXYGEN SATURATION: 100 % | RESPIRATION RATE: 18 BRPM

## 2022-09-12 DIAGNOSIS — M25.50 MULTIPLE JOINT PAIN: ICD-10-CM

## 2022-09-12 DIAGNOSIS — Z96.641 STATUS POST TOTAL REPLACEMENT OF RIGHT HIP: ICD-10-CM

## 2022-09-12 DIAGNOSIS — I24.9 ACS (ACUTE CORONARY SYNDROME) (H): ICD-10-CM

## 2022-09-12 DIAGNOSIS — E66.01 MORBID OBESITY (H): ICD-10-CM

## 2022-09-12 DIAGNOSIS — I25.10 CORONARY ARTERY DISEASE INVOLVING NATIVE CORONARY ARTERY OF NATIVE HEART WITHOUT ANGINA PECTORIS: Primary | ICD-10-CM

## 2022-09-12 DIAGNOSIS — I25.119 CORONARY ARTERY DISEASE INVOLVING NATIVE HEART WITH ANGINA PECTORIS, UNSPECIFIED VESSEL OR LESION TYPE (H): ICD-10-CM

## 2022-09-12 DIAGNOSIS — I21.9 ACUTE MYOCARDIAL INFARCTION, INITIAL EPISODE OF CARE (H): ICD-10-CM

## 2022-09-12 DIAGNOSIS — Z96.642 S/P TOTAL LEFT HIP ARTHROPLASTY: ICD-10-CM

## 2022-09-12 DIAGNOSIS — I21.3 ST ELEVATION MYOCARDIAL INFARCTION (STEMI), UNSPECIFIED ARTERY (H): ICD-10-CM

## 2022-09-12 DIAGNOSIS — I10 BENIGN ESSENTIAL HYPERTENSION: ICD-10-CM

## 2022-09-12 DIAGNOSIS — M16.12 PRIMARY OSTEOARTHRITIS OF LEFT HIP: ICD-10-CM

## 2022-09-12 DIAGNOSIS — E78.5 HYPERLIPIDEMIA LDL GOAL <100: ICD-10-CM

## 2022-09-12 DIAGNOSIS — T88.7XXA MEDICATION SIDE EFFECT: ICD-10-CM

## 2022-09-12 DIAGNOSIS — I25.2 HISTORY OF ACUTE MYOCARDIAL INFARCTION OF INFERIOR WALL: ICD-10-CM

## 2022-09-12 DIAGNOSIS — I25.10 CAD (CORONARY ARTERY DISEASE): ICD-10-CM

## 2022-09-12 DIAGNOSIS — Z96.642 STATUS POST TOTAL HIP REPLACEMENT, LEFT: Primary | ICD-10-CM

## 2022-09-12 DIAGNOSIS — M16.0 PRIMARY OSTEOARTHRITIS OF BOTH HIPS: ICD-10-CM

## 2022-09-12 LAB
ABO/RH(D): NORMAL
ACT BLD: 233 SECONDS (ref 74–150)
ACT BLD: 263 SECONDS (ref 74–150)
ACT BLD: 297 SECONDS (ref 74–150)
ALBUMIN SERPL-MCNC: 2.3 G/DL (ref 3.4–5)
ALP SERPL-CCNC: 55 U/L (ref 40–150)
ALT SERPL W P-5'-P-CCNC: 19 U/L (ref 0–70)
ANION GAP SERPL CALCULATED.3IONS-SCNC: 10 MMOL/L (ref 3–14)
ANTIBODY SCREEN: NEGATIVE
AST SERPL W P-5'-P-CCNC: 15 U/L (ref 0–45)
ATRIAL RATE - MUSE: 87 BPM
BILIRUB SERPL-MCNC: 0.2 MG/DL (ref 0.2–1.3)
BLD PROD TYP BPU: NORMAL
BLOOD COMPONENT TYPE: NORMAL
BUN SERPL-MCNC: 15 MG/DL (ref 7–30)
CALCIUM SERPL-MCNC: 7.6 MG/DL (ref 8.5–10.1)
CHLORIDE BLD-SCNC: 109 MMOL/L (ref 94–109)
CHOLEST SERPL-MCNC: 119 MG/DL
CO2 SERPL-SCNC: 19 MMOL/L (ref 20–32)
CODING SYSTEM: NORMAL
CREAT SERPL-MCNC: 0.47 MG/DL (ref 0.66–1.25)
CROSSMATCH: NORMAL
DIASTOLIC BLOOD PRESSURE - MUSE: NORMAL MMHG
GFR SERPL CREATININE-BSD FRML MDRD: >90 ML/MIN/1.73M2
GLUCOSE BLD-MCNC: 190 MG/DL (ref 70–99)
HDLC SERPL-MCNC: 48 MG/DL
HGB BLD-MCNC: 11.8 G/DL (ref 13.3–17.7)
HOLD SPECIMEN: NORMAL
INTERPRETATION ECG - MUSE: NORMAL
ISSUE DATE AND TIME: NORMAL
LDLC SERPL CALC-MCNC: 54 MG/DL
MAGNESIUM SERPL-MCNC: 2 MG/DL (ref 1.6–2.3)
NONHDLC SERPL-MCNC: 71 MG/DL
P AXIS - MUSE: 22 DEGREES
POTASSIUM BLD-SCNC: 3.9 MMOL/L (ref 3.4–5.3)
PR INTERVAL - MUSE: 136 MS
PROT SERPL-MCNC: 4.5 G/DL (ref 6.8–8.8)
QRS DURATION - MUSE: 142 MS
QT - MUSE: 408 MS
QTC - MUSE: 490 MS
R AXIS - MUSE: -10 DEGREES
SODIUM SERPL-SCNC: 138 MMOL/L (ref 133–144)
SPECIMEN EXPIRATION DATE: NORMAL
SYSTOLIC BLOOD PRESSURE - MUSE: NORMAL MMHG
T AXIS - MUSE: 31 DEGREES
TRIGL SERPL-MCNC: 83 MG/DL
UNIT ABO/RH: NORMAL
UNIT NUMBER: NORMAL
UNIT STATUS: NORMAL
UNIT TYPE ISBT: 9500
VENTRICULAR RATE- MUSE: 87 BPM

## 2022-09-12 PROCEDURE — 99285 EMERGENCY DEPT VISIT HI MDM: CPT | Mod: 25 | Performed by: EMERGENCY MEDICINE

## 2022-09-12 PROCEDURE — 80053 COMPREHEN METABOLIC PANEL: CPT | Performed by: NURSE ANESTHETIST, CERTIFIED REGISTERED

## 2022-09-12 PROCEDURE — 250N000011 HC RX IP 250 OP 636: Performed by: ORTHOPAEDIC SURGERY

## 2022-09-12 PROCEDURE — 258N000001 HC RX 258: Performed by: ORTHOPAEDIC SURGERY

## 2022-09-12 PROCEDURE — 250N000025 HC SEVOFLURANE, PER MIN: Performed by: ORTHOPAEDIC SURGERY

## 2022-09-12 PROCEDURE — 250N000011 HC RX IP 250 OP 636: Performed by: NURSE PRACTITIONER

## 2022-09-12 PROCEDURE — 93005 ELECTROCARDIOGRAM TRACING: CPT | Performed by: EMERGENCY MEDICINE

## 2022-09-12 PROCEDURE — C1769 GUIDE WIRE: HCPCS | Performed by: INTERNAL MEDICINE

## 2022-09-12 PROCEDURE — 80061 LIPID PANEL: CPT | Performed by: INTERNAL MEDICINE

## 2022-09-12 PROCEDURE — 71275 CT ANGIOGRAPHY CHEST: CPT

## 2022-09-12 PROCEDURE — 250N000013 HC RX MED GY IP 250 OP 250 PS 637: Performed by: NURSE PRACTITIONER

## 2022-09-12 PROCEDURE — 36415 COLL VENOUS BLD VENIPUNCTURE: CPT | Performed by: INTERNAL MEDICINE

## 2022-09-12 PROCEDURE — 86901 BLOOD TYPING SEROLOGIC RH(D): CPT | Performed by: NURSE ANESTHETIST, CERTIFIED REGISTERED

## 2022-09-12 PROCEDURE — 83735 ASSAY OF MAGNESIUM: CPT

## 2022-09-12 PROCEDURE — 250N000009 HC RX 250: Performed by: INTERNAL MEDICINE

## 2022-09-12 PROCEDURE — 258N000003 HC RX IP 258 OP 636: Performed by: NURSE ANESTHETIST, CERTIFIED REGISTERED

## 2022-09-12 PROCEDURE — C1887 CATHETER, GUIDING: HCPCS | Performed by: INTERNAL MEDICINE

## 2022-09-12 PROCEDURE — C9606 PERC D-E COR REVASC W AMI S: HCPCS | Performed by: INTERNAL MEDICINE

## 2022-09-12 PROCEDURE — 96374 THER/PROPH/DIAG INJ IV PUSH: CPT | Performed by: EMERGENCY MEDICINE

## 2022-09-12 PROCEDURE — 93010 ELECTROCARDIOGRAM REPORT: CPT | Performed by: INTERNAL MEDICINE

## 2022-09-12 PROCEDURE — 99152 MOD SED SAME PHYS/QHP 5/>YRS: CPT | Mod: GC | Performed by: INTERNAL MEDICINE

## 2022-09-12 PROCEDURE — 88300 SURGICAL PATH GROSS: CPT | Mod: TC | Performed by: ORTHOPAEDIC SURGERY

## 2022-09-12 PROCEDURE — 86923 COMPATIBILITY TEST ELECTRIC: CPT | Performed by: EMERGENCY MEDICINE

## 2022-09-12 PROCEDURE — 210N000002 HC R&B HEART CARE

## 2022-09-12 PROCEDURE — 92941 PRQ TRLML REVSC TOT OCCL AMI: CPT | Mod: LD | Performed by: INTERNAL MEDICINE

## 2022-09-12 PROCEDURE — 93005 ELECTROCARDIOGRAM TRACING: CPT

## 2022-09-12 PROCEDURE — 250N000011 HC RX IP 250 OP 636: Performed by: INTERNAL MEDICINE

## 2022-09-12 PROCEDURE — 85018 HEMOGLOBIN: CPT | Performed by: NURSE PRACTITIONER

## 2022-09-12 PROCEDURE — 99291 CRITICAL CARE FIRST HOUR: CPT | Mod: 25 | Performed by: EMERGENCY MEDICINE

## 2022-09-12 PROCEDURE — 370N000017 HC ANESTHESIA TECHNICAL FEE, PER MIN: Performed by: ORTHOPAEDIC SURGERY

## 2022-09-12 PROCEDURE — 88300 SURGICAL PATH GROSS: CPT | Mod: 26 | Performed by: STUDENT IN AN ORGANIZED HEALTH CARE EDUCATION/TRAINING PROGRAM

## 2022-09-12 PROCEDURE — C1725 CATH, TRANSLUMIN NON-LASER: HCPCS | Performed by: INTERNAL MEDICINE

## 2022-09-12 PROCEDURE — 93454 CORONARY ARTERY ANGIO S&I: CPT | Mod: 26 | Performed by: INTERNAL MEDICINE

## 2022-09-12 PROCEDURE — 99153 MOD SED SAME PHYS/QHP EA: CPT | Performed by: INTERNAL MEDICINE

## 2022-09-12 PROCEDURE — 250N000011 HC RX IP 250 OP 636: Performed by: NURSE ANESTHETIST, CERTIFIED REGISTERED

## 2022-09-12 PROCEDURE — 36415 COLL VENOUS BLD VENIPUNCTURE: CPT | Performed by: EMERGENCY MEDICINE

## 2022-09-12 PROCEDURE — 93010 ELECTROCARDIOGRAM REPORT: CPT | Performed by: EMERGENCY MEDICINE

## 2022-09-12 PROCEDURE — 272N000001 HC OR GENERAL SUPPLY STERILE: Performed by: INTERNAL MEDICINE

## 2022-09-12 PROCEDURE — 84484 ASSAY OF TROPONIN QUANT: CPT | Performed by: EMERGENCY MEDICINE

## 2022-09-12 PROCEDURE — C1760 CLOSURE DEV, VASC: HCPCS | Performed by: INTERNAL MEDICINE

## 2022-09-12 PROCEDURE — 99152 MOD SED SAME PHYS/QHP 5/>YRS: CPT | Performed by: INTERNAL MEDICINE

## 2022-09-12 PROCEDURE — P9016 RBC LEUKOCYTES REDUCED: HCPCS | Performed by: EMERGENCY MEDICINE

## 2022-09-12 PROCEDURE — 85347 COAGULATION TIME ACTIVATED: CPT

## 2022-09-12 PROCEDURE — 258N000003 HC RX IP 258 OP 636: Performed by: INTERNAL MEDICINE

## 2022-09-12 PROCEDURE — 250N000013 HC RX MED GY IP 250 OP 250 PS 637: Performed by: INTERNAL MEDICINE

## 2022-09-12 PROCEDURE — 250N000011 HC RX IP 250 OP 636

## 2022-09-12 PROCEDURE — 99285 EMERGENCY DEPT VISIT HI MDM: CPT | Mod: 25

## 2022-09-12 PROCEDURE — 360N000077 HC SURGERY LEVEL 4, PER MIN: Performed by: ORTHOPAEDIC SURGERY

## 2022-09-12 PROCEDURE — B2111ZZ FLUOROSCOPY OF MULTIPLE CORONARY ARTERIES USING LOW OSMOLAR CONTRAST: ICD-10-PCS | Performed by: INTERNAL MEDICINE

## 2022-09-12 PROCEDURE — 250N000009 HC RX 250: Performed by: ORTHOPAEDIC SURGERY

## 2022-09-12 PROCEDURE — 258N000003 HC RX IP 258 OP 636: Performed by: ORTHOPAEDIC SURGERY

## 2022-09-12 PROCEDURE — 250N000009 HC RX 250: Performed by: NURSE ANESTHETIST, CERTIFIED REGISTERED

## 2022-09-12 PROCEDURE — 02703ZZ DILATION OF CORONARY ARTERY, ONE ARTERY, PERCUTANEOUS APPROACH: ICD-10-PCS | Performed by: INTERNAL MEDICINE

## 2022-09-12 PROCEDURE — 999N000141 HC STATISTIC PRE-PROCEDURE NURSING ASSESSMENT: Performed by: ORTHOPAEDIC SURGERY

## 2022-09-12 PROCEDURE — 99291 CRITICAL CARE FIRST HOUR: CPT | Mod: 25 | Performed by: INTERNAL MEDICINE

## 2022-09-12 PROCEDURE — 93454 CORONARY ARTERY ANGIO S&I: CPT | Performed by: INTERNAL MEDICINE

## 2022-09-12 PROCEDURE — C1894 INTRO/SHEATH, NON-LASER: HCPCS | Performed by: INTERNAL MEDICINE

## 2022-09-12 PROCEDURE — 710N000011 HC RECOVERY PHASE 1, LEVEL 3, PER MIN: Performed by: ORTHOPAEDIC SURGERY

## 2022-09-12 PROCEDURE — 250N000013 HC RX MED GY IP 250 OP 250 PS 637: Performed by: NURSE ANESTHETIST, CERTIFIED REGISTERED

## 2022-09-12 PROCEDURE — 999N000065 XR PELVIS PORT 1/2 VIEWS

## 2022-09-12 PROCEDURE — 85025 COMPLETE CBC W/AUTO DIFF WBC: CPT | Performed by: EMERGENCY MEDICINE

## 2022-09-12 PROCEDURE — 272N000001 HC OR GENERAL SUPPLY STERILE: Performed by: ORTHOPAEDIC SURGERY

## 2022-09-12 PROCEDURE — C1776 JOINT DEVICE (IMPLANTABLE): HCPCS | Performed by: ORTHOPAEDIC SURGERY

## 2022-09-12 PROCEDURE — 27130 TOTAL HIP ARTHROPLASTY: CPT | Mod: LT | Performed by: ORTHOPAEDIC SURGERY

## 2022-09-12 DEVICE — CLOSURE ANGIOSEAL 6FR 610130: Type: IMPLANTABLE DEVICE | Status: FUNCTIONAL

## 2022-09-12 DEVICE — IMP LINER STRK TRIDENT X3 POLY 36MM 0DEG SZ G 623-00-36G: Type: IMPLANTABLE DEVICE | Site: HIP | Status: FUNCTIONAL

## 2022-09-12 DEVICE — IMPLANTABLE DEVICE: Type: IMPLANTABLE DEVICE | Site: HIP | Status: FUNCTIONAL

## 2022-09-12 DEVICE — IMP HEAD FEMORAL STRK BIOLOX DELTA CERAMIC 36MM +5MM: Type: IMPLANTABLE DEVICE | Site: HIP | Status: FUNCTIONAL

## 2022-09-12 RX ORDER — CEFAZOLIN SODIUM/WATER 2 G/20 ML
2 SYRINGE (ML) INTRAVENOUS EVERY 8 HOURS
Status: CANCELLED | OUTPATIENT
Start: 2022-09-12 | End: 2022-09-12

## 2022-09-12 RX ORDER — TRANEXAMIC ACID 10 MG/ML
1 INJECTION, SOLUTION INTRAVENOUS ONCE
Status: COMPLETED | OUTPATIENT
Start: 2022-09-12 | End: 2022-09-12

## 2022-09-12 RX ORDER — METOPROLOL SUCCINATE 25 MG/1
12.5 TABLET, EXTENDED RELEASE ORAL DAILY
Status: ON HOLD | COMMUNITY
End: 2022-09-20

## 2022-09-12 RX ORDER — NITROGLYCERIN 0.4 MG/1
0.4 TABLET SUBLINGUAL EVERY 5 MIN PRN
Status: DISCONTINUED | OUTPATIENT
Start: 2022-09-12 | End: 2022-09-20 | Stop reason: HOSPADM

## 2022-09-12 RX ORDER — PANTOPRAZOLE SODIUM 40 MG/1
40 TABLET, DELAYED RELEASE ORAL
Status: DISCONTINUED | OUTPATIENT
Start: 2022-09-12 | End: 2022-09-20 | Stop reason: HOSPADM

## 2022-09-12 RX ORDER — ASPIRIN 325 MG
325 TABLET ORAL ONCE
Status: DISCONTINUED | OUTPATIENT
Start: 2022-09-12 | End: 2022-09-12

## 2022-09-12 RX ORDER — ASPIRIN 81 MG/1
81 TABLET ORAL DAILY
Status: DISCONTINUED | OUTPATIENT
Start: 2022-09-13 | End: 2022-09-20 | Stop reason: HOSPADM

## 2022-09-12 RX ORDER — LORAZEPAM 0.5 MG/1
0.5 TABLET ORAL
Status: DISCONTINUED | OUTPATIENT
Start: 2022-09-12 | End: 2022-09-12

## 2022-09-12 RX ORDER — OXYCODONE HYDROCHLORIDE 5 MG/1
10 TABLET ORAL EVERY 4 HOURS PRN
Status: DISCONTINUED | OUTPATIENT
Start: 2022-09-12 | End: 2022-09-17

## 2022-09-12 RX ORDER — LIDOCAINE 40 MG/G
CREAM TOPICAL
Status: DISCONTINUED | OUTPATIENT
Start: 2022-09-12 | End: 2022-09-12

## 2022-09-12 RX ORDER — ASPIRIN 81 MG/1
243 TABLET, CHEWABLE ORAL ONCE
Status: DISCONTINUED | OUTPATIENT
Start: 2022-09-12 | End: 2022-09-12

## 2022-09-12 RX ORDER — ESMOLOL HYDROCHLORIDE 10 MG/ML
INJECTION INTRAVENOUS PRN
Status: DISCONTINUED | OUTPATIENT
Start: 2022-09-12 | End: 2022-09-12

## 2022-09-12 RX ORDER — ACETAMINOPHEN 325 MG/1
650 TABLET ORAL EVERY 4 HOURS PRN
Status: CANCELLED | OUTPATIENT
Start: 2022-09-15

## 2022-09-12 RX ORDER — POLYETHYLENE GLYCOL 3350 17 G/17G
17 POWDER, FOR SOLUTION ORAL DAILY
Status: CANCELLED | OUTPATIENT
Start: 2022-09-13

## 2022-09-12 RX ORDER — FENTANYL CITRATE 50 UG/ML
INJECTION, SOLUTION INTRAMUSCULAR; INTRAVENOUS PRN
Status: DISCONTINUED | OUTPATIENT
Start: 2022-09-12 | End: 2022-09-12

## 2022-09-12 RX ORDER — NITROGLYCERIN 0.4 MG/1
0.4 TABLET SUBLINGUAL EVERY 5 MIN PRN
Status: DISCONTINUED | OUTPATIENT
Start: 2022-09-12 | End: 2022-09-12 | Stop reason: HOSPADM

## 2022-09-12 RX ORDER — PROPOFOL 10 MG/ML
INJECTION, EMULSION INTRAVENOUS PRN
Status: DISCONTINUED | OUTPATIENT
Start: 2022-09-12 | End: 2022-09-12

## 2022-09-12 RX ORDER — NALOXONE HYDROCHLORIDE 0.4 MG/ML
0.4 INJECTION, SOLUTION INTRAMUSCULAR; INTRAVENOUS; SUBCUTANEOUS
Status: DISCONTINUED | OUTPATIENT
Start: 2022-09-12 | End: 2022-09-17

## 2022-09-12 RX ORDER — BUPROPION HYDROCHLORIDE 150 MG/1
150 TABLET, EXTENDED RELEASE ORAL DAILY
COMMUNITY

## 2022-09-12 RX ORDER — MORPHINE SULFATE 2 MG/ML
1 INJECTION, SOLUTION INTRAMUSCULAR; INTRAVENOUS
Status: DISCONTINUED | OUTPATIENT
Start: 2022-09-12 | End: 2022-09-12

## 2022-09-12 RX ORDER — IOPAMIDOL 755 MG/ML
500 INJECTION, SOLUTION INTRAVASCULAR ONCE
Status: COMPLETED | OUTPATIENT
Start: 2022-09-12 | End: 2022-09-12

## 2022-09-12 RX ORDER — PROPOFOL 10 MG/ML
INJECTION, EMULSION INTRAVENOUS CONTINUOUS PRN
Status: DISCONTINUED | OUTPATIENT
Start: 2022-09-12 | End: 2022-09-12

## 2022-09-12 RX ORDER — METOPROLOL TARTRATE 1 MG/ML
INJECTION, SOLUTION INTRAVENOUS PRN
Status: DISCONTINUED | OUTPATIENT
Start: 2022-09-12 | End: 2022-09-12

## 2022-09-12 RX ORDER — ONDANSETRON 2 MG/ML
4 INJECTION INTRAMUSCULAR; INTRAVENOUS EVERY 30 MIN PRN
Status: DISCONTINUED | OUTPATIENT
Start: 2022-09-12 | End: 2022-09-12 | Stop reason: HOSPADM

## 2022-09-12 RX ORDER — SODIUM CHLORIDE 9 MG/ML
INJECTION, SOLUTION INTRAVENOUS CONTINUOUS
Status: DISCONTINUED | OUTPATIENT
Start: 2022-09-12 | End: 2022-09-12

## 2022-09-12 RX ORDER — METOPROLOL TARTRATE 1 MG/ML
5 INJECTION, SOLUTION INTRAVENOUS
Status: DISCONTINUED | OUTPATIENT
Start: 2022-09-12 | End: 2022-09-20 | Stop reason: HOSPADM

## 2022-09-12 RX ORDER — CLOPIDOGREL BISULFATE 75 MG/1
TABLET ORAL
Status: DISCONTINUED | OUTPATIENT
Start: 2022-09-12 | End: 2022-09-12 | Stop reason: HOSPADM

## 2022-09-12 RX ORDER — CEFAZOLIN SODIUM/WATER 3 G/30 ML
3 SYRINGE (ML) INTRAVENOUS SEE ADMIN INSTRUCTIONS
Status: DISCONTINUED | OUTPATIENT
Start: 2022-09-12 | End: 2022-09-12 | Stop reason: HOSPADM

## 2022-09-12 RX ORDER — LIDOCAINE 40 MG/G
CREAM TOPICAL
Status: DISCONTINUED | OUTPATIENT
Start: 2022-09-12 | End: 2022-09-12 | Stop reason: HOSPADM

## 2022-09-12 RX ORDER — ONDANSETRON 4 MG/1
4 TABLET, ORALLY DISINTEGRATING ORAL EVERY 6 HOURS PRN
Status: CANCELLED | OUTPATIENT
Start: 2022-09-12

## 2022-09-12 RX ORDER — NALOXONE HYDROCHLORIDE 0.4 MG/ML
0.2 INJECTION, SOLUTION INTRAMUSCULAR; INTRAVENOUS; SUBCUTANEOUS
Status: DISCONTINUED | OUTPATIENT
Start: 2022-09-12 | End: 2022-09-17

## 2022-09-12 RX ORDER — OXYCODONE HYDROCHLORIDE 5 MG/1
5-10 TABLET ORAL EVERY 4 HOURS PRN
Qty: 30 TABLET | Refills: 0 | Status: ON HOLD | OUTPATIENT
Start: 2022-09-12 | End: 2022-09-20

## 2022-09-12 RX ORDER — HYDROXYZINE HYDROCHLORIDE 25 MG/1
25 TABLET, FILM COATED ORAL EVERY 6 HOURS PRN
Status: CANCELLED | OUTPATIENT
Start: 2022-09-12

## 2022-09-12 RX ORDER — ONDANSETRON 2 MG/ML
INJECTION INTRAMUSCULAR; INTRAVENOUS PRN
Status: DISCONTINUED | OUTPATIENT
Start: 2022-09-12 | End: 2022-09-12

## 2022-09-12 RX ORDER — FENTANYL CITRATE 50 UG/ML
INJECTION, SOLUTION INTRAMUSCULAR; INTRAVENOUS
Status: DISCONTINUED | OUTPATIENT
Start: 2022-09-12 | End: 2022-09-12 | Stop reason: HOSPADM

## 2022-09-12 RX ORDER — BUPIVACAINE HYDROCHLORIDE 7.5 MG/ML
INJECTION, SOLUTION INTRASPINAL PRN
Status: DISCONTINUED | OUTPATIENT
Start: 2022-09-12 | End: 2022-09-12

## 2022-09-12 RX ORDER — CALCIUM CARBONATE 500 MG/1
500 TABLET, CHEWABLE ORAL DAILY PRN
Status: DISCONTINUED | OUTPATIENT
Start: 2022-09-12 | End: 2022-09-20 | Stop reason: HOSPADM

## 2022-09-12 RX ORDER — ATORVASTATIN CALCIUM 80 MG/1
80 TABLET, FILM COATED ORAL DAILY
Status: DISCONTINUED | OUTPATIENT
Start: 2022-09-12 | End: 2022-09-17

## 2022-09-12 RX ORDER — METHOCARBAMOL 500 MG/1
500 TABLET, FILM COATED ORAL 3 TIMES DAILY PRN
Status: DISCONTINUED | OUTPATIENT
Start: 2022-09-12 | End: 2022-09-20 | Stop reason: HOSPADM

## 2022-09-12 RX ORDER — CEFAZOLIN SODIUM/WATER 3 G/30 ML
3 SYRINGE (ML) INTRAVENOUS
Status: COMPLETED | OUTPATIENT
Start: 2022-09-12 | End: 2022-09-12

## 2022-09-12 RX ORDER — BUPIVACAINE HYDROCHLORIDE AND EPINEPHRINE 2.5; 5 MG/ML; UG/ML
INJECTION, SOLUTION EPIDURAL; INFILTRATION; INTRACAUDAL; PERINEURAL PRN
Status: DISCONTINUED | OUTPATIENT
Start: 2022-09-12 | End: 2022-09-12 | Stop reason: HOSPADM

## 2022-09-12 RX ORDER — TRANEXAMIC ACID 10 MG/ML
1 INJECTION, SOLUTION INTRAVENOUS ONCE
Status: DISCONTINUED | OUTPATIENT
Start: 2022-09-12 | End: 2022-09-12 | Stop reason: HOSPADM

## 2022-09-12 RX ORDER — HYDROMORPHONE HYDROCHLORIDE 1 MG/ML
0.5 INJECTION, SOLUTION INTRAMUSCULAR; INTRAVENOUS; SUBCUTANEOUS EVERY 5 MIN PRN
Status: DISCONTINUED | OUTPATIENT
Start: 2022-09-12 | End: 2022-09-12 | Stop reason: HOSPADM

## 2022-09-12 RX ORDER — MORPHINE SULFATE 2 MG/ML
2 INJECTION, SOLUTION INTRAMUSCULAR; INTRAVENOUS
Status: DISCONTINUED | OUTPATIENT
Start: 2022-09-12 | End: 2022-09-17

## 2022-09-12 RX ORDER — ASPIRIN 81 MG/1
81 TABLET, CHEWABLE ORAL ONCE
Status: DISCONTINUED | OUTPATIENT
Start: 2022-09-12 | End: 2022-09-12

## 2022-09-12 RX ORDER — LIDOCAINE HYDROCHLORIDE 20 MG/ML
INJECTION, SOLUTION INFILTRATION; PERINEURAL
Status: DISCONTINUED | OUTPATIENT
Start: 2022-09-12 | End: 2022-09-12 | Stop reason: HOSPADM

## 2022-09-12 RX ORDER — ONDANSETRON 4 MG/1
4 TABLET, ORALLY DISINTEGRATING ORAL EVERY 6 HOURS PRN
Status: DISCONTINUED | OUTPATIENT
Start: 2022-09-12 | End: 2022-09-20 | Stop reason: HOSPADM

## 2022-09-12 RX ORDER — POTASSIUM CHLORIDE 1500 MG/1
20 TABLET, EXTENDED RELEASE ORAL
Status: DISCONTINUED | OUTPATIENT
Start: 2022-09-12 | End: 2022-09-12

## 2022-09-12 RX ORDER — SODIUM CHLORIDE 9 MG/ML
INJECTION, SOLUTION INTRAVENOUS CONTINUOUS
Status: CANCELLED | OUTPATIENT
Start: 2022-09-12

## 2022-09-12 RX ORDER — NITROGLYCERIN 0.4 MG/1
0.4 TABLET SUBLINGUAL EVERY 5 MIN PRN
Status: DISCONTINUED | OUTPATIENT
Start: 2022-09-12 | End: 2022-09-12

## 2022-09-12 RX ORDER — VANCOMYCIN HYDROCHLORIDE 1 G/20ML
INJECTION, POWDER, LYOPHILIZED, FOR SOLUTION INTRAVENOUS PRN
Status: DISCONTINUED | OUTPATIENT
Start: 2022-09-12 | End: 2022-09-12 | Stop reason: HOSPADM

## 2022-09-12 RX ORDER — NITROGLYCERIN 5 MG/ML
VIAL (ML) INTRAVENOUS
Status: DISCONTINUED | OUTPATIENT
Start: 2022-09-12 | End: 2022-09-12 | Stop reason: HOSPADM

## 2022-09-12 RX ORDER — SODIUM CHLORIDE 9 MG/ML
INJECTION, SOLUTION INTRAVENOUS CONTINUOUS
Status: ACTIVE | OUTPATIENT
Start: 2022-09-12 | End: 2022-09-12

## 2022-09-12 RX ORDER — CLOPIDOGREL BISULFATE 75 MG/1
75 TABLET ORAL DAILY
Status: DISCONTINUED | OUTPATIENT
Start: 2022-09-13 | End: 2022-09-14

## 2022-09-12 RX ORDER — ROSUVASTATIN CALCIUM 40 MG/1
40 TABLET, COATED ORAL DAILY
Status: ON HOLD | COMMUNITY
End: 2022-09-20

## 2022-09-12 RX ORDER — BISACODYL 10 MG
10 SUPPOSITORY, RECTAL RECTAL DAILY PRN
Status: CANCELLED | OUTPATIENT
Start: 2022-09-12

## 2022-09-12 RX ORDER — OXYCODONE HYDROCHLORIDE 5 MG/1
5 TABLET ORAL EVERY 4 HOURS PRN
Status: DISCONTINUED | OUTPATIENT
Start: 2022-09-12 | End: 2022-09-12 | Stop reason: HOSPADM

## 2022-09-12 RX ORDER — AMOXICILLIN 250 MG
1-2 CAPSULE ORAL 2 TIMES DAILY
Status: DISCONTINUED | OUTPATIENT
Start: 2022-09-12 | End: 2022-09-20 | Stop reason: HOSPADM

## 2022-09-12 RX ORDER — ACETAMINOPHEN 325 MG/1
650 TABLET ORAL EVERY 4 HOURS PRN
Status: DISCONTINUED | OUTPATIENT
Start: 2022-09-12 | End: 2022-09-20 | Stop reason: HOSPADM

## 2022-09-12 RX ORDER — SODIUM CHLORIDE, SODIUM LACTATE, POTASSIUM CHLORIDE, CALCIUM CHLORIDE 600; 310; 30; 20 MG/100ML; MG/100ML; MG/100ML; MG/100ML
INJECTION, SOLUTION INTRAVENOUS CONTINUOUS
Status: DISCONTINUED | OUTPATIENT
Start: 2022-09-12 | End: 2022-09-12 | Stop reason: HOSPADM

## 2022-09-12 RX ORDER — ASPIRIN 81 MG/1
81 TABLET, CHEWABLE ORAL DAILY
Status: ON HOLD | COMMUNITY
End: 2022-09-20

## 2022-09-12 RX ORDER — PHENYLEPHRINE HYDROCHLORIDE 10 MG/ML
INJECTION INTRAVENOUS PRN
Status: DISCONTINUED | OUTPATIENT
Start: 2022-09-12 | End: 2022-09-12

## 2022-09-12 RX ORDER — HYDRALAZINE HYDROCHLORIDE 20 MG/ML
10 INJECTION INTRAMUSCULAR; INTRAVENOUS EVERY 4 HOURS PRN
Status: DISCONTINUED | OUTPATIENT
Start: 2022-09-12 | End: 2022-09-20 | Stop reason: HOSPADM

## 2022-09-12 RX ORDER — OXYCODONE HYDROCHLORIDE 5 MG/1
5 TABLET ORAL EVERY 4 HOURS PRN
Status: DISCONTINUED | OUTPATIENT
Start: 2022-09-12 | End: 2022-09-19

## 2022-09-12 RX ORDER — AMOXICILLIN 250 MG
1 CAPSULE ORAL 2 TIMES DAILY
Status: CANCELLED | OUTPATIENT
Start: 2022-09-12

## 2022-09-12 RX ORDER — DEXAMETHASONE SODIUM PHOSPHATE 4 MG/ML
INJECTION, SOLUTION INTRA-ARTICULAR; INTRALESIONAL; INTRAMUSCULAR; INTRAVENOUS; SOFT TISSUE PRN
Status: DISCONTINUED | OUTPATIENT
Start: 2022-09-12 | End: 2022-09-12

## 2022-09-12 RX ORDER — LORAZEPAM 2 MG/ML
0.5 INJECTION INTRAMUSCULAR
Status: DISCONTINUED | OUTPATIENT
Start: 2022-09-12 | End: 2022-09-12

## 2022-09-12 RX ORDER — LIDOCAINE 40 MG/G
CREAM TOPICAL
Status: CANCELLED | OUTPATIENT
Start: 2022-09-12

## 2022-09-12 RX ORDER — LISINOPRIL 10 MG/1
5 TABLET ORAL DAILY
Status: ON HOLD | COMMUNITY
End: 2022-09-20

## 2022-09-12 RX ORDER — ONDANSETRON 4 MG/1
4 TABLET, ORALLY DISINTEGRATING ORAL EVERY 30 MIN PRN
Status: DISCONTINUED | OUTPATIENT
Start: 2022-09-12 | End: 2022-09-12 | Stop reason: HOSPADM

## 2022-09-12 RX ORDER — CELECOXIB 100 MG/1
100 CAPSULE ORAL 2 TIMES DAILY
Status: CANCELLED | OUTPATIENT
Start: 2022-09-12 | End: 2022-09-14

## 2022-09-12 RX ORDER — ONDANSETRON 2 MG/ML
4 INJECTION INTRAMUSCULAR; INTRAVENOUS EVERY 6 HOURS PRN
Status: DISCONTINUED | OUTPATIENT
Start: 2022-09-12 | End: 2022-09-20 | Stop reason: HOSPADM

## 2022-09-12 RX ORDER — OXYCODONE HYDROCHLORIDE 5 MG/1
5 TABLET ORAL EVERY 4 HOURS PRN
Status: CANCELLED | OUTPATIENT
Start: 2022-09-12

## 2022-09-12 RX ORDER — OXYCODONE HYDROCHLORIDE 5 MG/1
10 TABLET ORAL EVERY 4 HOURS PRN
Status: CANCELLED | OUTPATIENT
Start: 2022-09-12

## 2022-09-12 RX ORDER — METHOCARBAMOL 750 MG/1
750 TABLET, FILM COATED ORAL EVERY 6 HOURS PRN
Status: CANCELLED | OUTPATIENT
Start: 2022-09-12

## 2022-09-12 RX ORDER — LISINOPRIL 5 MG/1
5 TABLET ORAL DAILY
Status: DISCONTINUED | OUTPATIENT
Start: 2022-09-13 | End: 2022-09-17

## 2022-09-12 RX ORDER — ACETAMINOPHEN 325 MG/1
975 TABLET ORAL EVERY 8 HOURS
Status: CANCELLED | OUTPATIENT
Start: 2022-09-12 | End: 2022-09-15

## 2022-09-12 RX ORDER — FENTANYL CITRATE 50 UG/ML
50 INJECTION, SOLUTION INTRAMUSCULAR; INTRAVENOUS EVERY 5 MIN PRN
Status: DISCONTINUED | OUTPATIENT
Start: 2022-09-12 | End: 2022-09-12 | Stop reason: HOSPADM

## 2022-09-12 RX ORDER — HYDROMORPHONE HYDROCHLORIDE 1 MG/ML
0.4 INJECTION, SOLUTION INTRAMUSCULAR; INTRAVENOUS; SUBCUTANEOUS
Status: CANCELLED | OUTPATIENT
Start: 2022-09-12

## 2022-09-12 RX ORDER — ASPIRIN 81 MG/1
324 TABLET, CHEWABLE ORAL DAILY
Status: DISCONTINUED | OUTPATIENT
Start: 2022-09-12 | End: 2022-09-12 | Stop reason: HOSPADM

## 2022-09-12 RX ORDER — AMOXICILLIN 250 MG
1-2 CAPSULE ORAL 2 TIMES DAILY
Qty: 30 TABLET | Refills: 0 | Status: SHIPPED | OUTPATIENT
Start: 2022-09-12

## 2022-09-12 RX ORDER — HEPARIN SODIUM 1000 [USP'U]/ML
INJECTION, SOLUTION INTRAVENOUS; SUBCUTANEOUS
Status: DISCONTINUED | OUTPATIENT
Start: 2022-09-12 | End: 2022-09-12 | Stop reason: HOSPADM

## 2022-09-12 RX ORDER — ONDANSETRON 2 MG/ML
4 INJECTION INTRAMUSCULAR; INTRAVENOUS EVERY 6 HOURS PRN
Status: CANCELLED | OUTPATIENT
Start: 2022-09-12

## 2022-09-12 RX ORDER — ACETAMINOPHEN 325 MG/1
650 TABLET ORAL EVERY 6 HOURS PRN
Qty: 100 TABLET | Refills: 0 | Status: SHIPPED | OUTPATIENT
Start: 2022-09-12

## 2022-09-12 RX ORDER — HYDROMORPHONE HYDROCHLORIDE 1 MG/ML
0.2 INJECTION, SOLUTION INTRAMUSCULAR; INTRAVENOUS; SUBCUTANEOUS
Status: CANCELLED | OUTPATIENT
Start: 2022-09-12

## 2022-09-12 RX ORDER — LIDOCAINE 40 MG/G
CREAM TOPICAL
Status: DISCONTINUED | OUTPATIENT
Start: 2022-09-12 | End: 2022-09-20 | Stop reason: HOSPADM

## 2022-09-12 RX ORDER — IOPAMIDOL 755 MG/ML
INJECTION, SOLUTION INTRAVASCULAR
Status: DISCONTINUED | OUTPATIENT
Start: 2022-09-12 | End: 2022-09-12 | Stop reason: HOSPADM

## 2022-09-12 RX ADMIN — MIDAZOLAM 1 MG: 1 INJECTION INTRAMUSCULAR; INTRAVENOUS at 07:31

## 2022-09-12 RX ADMIN — METOPROLOL TARTRATE 1.5 MG: 5 INJECTION INTRAVENOUS at 09:06

## 2022-09-12 RX ADMIN — ATORVASTATIN CALCIUM 80 MG: 80 TABLET, FILM COATED ORAL at 20:05

## 2022-09-12 RX ADMIN — TRANEXAMIC ACID 1 G: 10 INJECTION, SOLUTION INTRAVENOUS at 11:05

## 2022-09-12 RX ADMIN — PROPOFOL 50 MG: 10 INJECTION, EMULSION INTRAVENOUS at 08:12

## 2022-09-12 RX ADMIN — PROPOFOL 100 MCG/KG/MIN: 10 INJECTION, EMULSION INTRAVENOUS at 07:45

## 2022-09-12 RX ADMIN — MORPHINE SULFATE 2 MG: 2 INJECTION, SOLUTION INTRAMUSCULAR; INTRAVENOUS at 20:06

## 2022-09-12 RX ADMIN — ONDANSETRON 4 MG: 2 INJECTION INTRAMUSCULAR; INTRAVENOUS at 21:45

## 2022-09-12 RX ADMIN — FENTANYL CITRATE 25 MCG: 50 INJECTION, SOLUTION INTRAMUSCULAR; INTRAVENOUS at 09:14

## 2022-09-12 RX ADMIN — ASPIRIN 81 MG CHEWABLE TABLET 324 MG: 81 TABLET CHEWABLE at 12:51

## 2022-09-12 RX ADMIN — PHENYLEPHRINE HYDROCHLORIDE 0.1 MCG/KG/MIN: 10 INJECTION INTRAVENOUS at 10:06

## 2022-09-12 RX ADMIN — PANTOPRAZOLE SODIUM 40 MG: 40 TABLET, DELAYED RELEASE ORAL at 20:05

## 2022-09-12 RX ADMIN — IOPAMIDOL 100 ML: 755 INJECTION, SOLUTION INTRAVENOUS at 13:40

## 2022-09-12 RX ADMIN — FENTANYL CITRATE 25 MCG: 50 INJECTION, SOLUTION INTRAMUSCULAR; INTRAVENOUS at 09:17

## 2022-09-12 RX ADMIN — FENTANYL CITRATE 50 MCG: 50 INJECTION, SOLUTION INTRAMUSCULAR; INTRAVENOUS at 13:25

## 2022-09-12 RX ADMIN — FENTANYL CITRATE 50 MCG: 50 INJECTION, SOLUTION INTRAMUSCULAR; INTRAVENOUS at 12:23

## 2022-09-12 RX ADMIN — FENTANYL CITRATE 50 MCG: 50 INJECTION, SOLUTION INTRAMUSCULAR; INTRAVENOUS at 14:00

## 2022-09-12 RX ADMIN — HYDROMORPHONE HYDROCHLORIDE 0.5 MG: 1 INJECTION, SOLUTION INTRAMUSCULAR; INTRAVENOUS; SUBCUTANEOUS at 11:21

## 2022-09-12 RX ADMIN — FENTANYL CITRATE 50 MCG: 50 INJECTION, SOLUTION INTRAMUSCULAR; INTRAVENOUS at 12:51

## 2022-09-12 RX ADMIN — SODIUM CHLORIDE, POTASSIUM CHLORIDE, SODIUM LACTATE AND CALCIUM CHLORIDE: 600; 310; 30; 20 INJECTION, SOLUTION INTRAVENOUS at 06:56

## 2022-09-12 RX ADMIN — SODIUM CHLORIDE 70 ML: 9 INJECTION, SOLUTION INTRAVENOUS at 13:39

## 2022-09-12 RX ADMIN — PROPOFOL 50 MG: 10 INJECTION, EMULSION INTRAVENOUS at 08:16

## 2022-09-12 RX ADMIN — ESMOLOL HYDROCHLORIDE 20 MG: 10 INJECTION, SOLUTION INTRAVENOUS at 09:51

## 2022-09-12 RX ADMIN — NITROGLYCERIN 0.4 MG: 0.4 TABLET SUBLINGUAL at 12:25

## 2022-09-12 RX ADMIN — MIDAZOLAM 1 MG: 1 INJECTION INTRAMUSCULAR; INTRAVENOUS at 07:26

## 2022-09-12 RX ADMIN — SENNOSIDES AND DOCUSATE SODIUM 1 TABLET: 50; 8.6 TABLET ORAL at 20:24

## 2022-09-12 RX ADMIN — LIDOCAINE HYDROCHLORIDE 0.5 ML: 10 INJECTION, SOLUTION EPIDURAL; INFILTRATION; INTRACAUDAL; PERINEURAL at 06:59

## 2022-09-12 RX ADMIN — DEXAMETHASONE SODIUM PHOSPHATE 8 MG: 4 INJECTION, SOLUTION INTRA-ARTICULAR; INTRALESIONAL; INTRAMUSCULAR; INTRAVENOUS; SOFT TISSUE at 10:00

## 2022-09-12 RX ADMIN — MORPHINE SULFATE 1 MG: 2 INJECTION, SOLUTION INTRAMUSCULAR; INTRAVENOUS at 18:35

## 2022-09-12 RX ADMIN — FENTANYL CITRATE 25 MCG: 50 INJECTION, SOLUTION INTRAMUSCULAR; INTRAVENOUS at 09:12

## 2022-09-12 RX ADMIN — PHENYLEPHRINE HYDROCHLORIDE 100 MCG: 10 INJECTION INTRAVENOUS at 10:06

## 2022-09-12 RX ADMIN — PROPOFOL 150 MG: 10 INJECTION, EMULSION INTRAVENOUS at 09:58

## 2022-09-12 RX ADMIN — NITROGLYCERIN 0.4 MG: 0.4 TABLET SUBLINGUAL at 12:45

## 2022-09-12 RX ADMIN — FENTANYL CITRATE 25 MCG: 50 INJECTION, SOLUTION INTRAMUSCULAR; INTRAVENOUS at 09:26

## 2022-09-12 RX ADMIN — ONDANSETRON 4 MG: 2 INJECTION INTRAMUSCULAR; INTRAVENOUS at 11:21

## 2022-09-12 RX ADMIN — ROCURONIUM BROMIDE 50 MG: 50 INJECTION, SOLUTION INTRAVENOUS at 09:59

## 2022-09-12 RX ADMIN — METOPROLOL SUCCINATE 12.5 MG: 25 TABLET, EXTENDED RELEASE ORAL at 20:05

## 2022-09-12 RX ADMIN — ESMOLOL HYDROCHLORIDE 20 MG: 10 INJECTION, SOLUTION INTRAVENOUS at 09:53

## 2022-09-12 RX ADMIN — METOPROLOL TARTRATE 1 MG: 5 INJECTION INTRAVENOUS at 09:02

## 2022-09-12 RX ADMIN — FENTANYL CITRATE 20 MCG: 50 INJECTION, SOLUTION INTRAMUSCULAR; INTRAVENOUS at 08:03

## 2022-09-12 RX ADMIN — Medication 3 G: at 08:00

## 2022-09-12 RX ADMIN — NITROGLYCERIN 0.4 MG: 0.4 TABLET SUBLINGUAL at 12:30

## 2022-09-12 RX ADMIN — SODIUM CHLORIDE, POTASSIUM CHLORIDE, SODIUM LACTATE AND CALCIUM CHLORIDE: 600; 310; 30; 20 INJECTION, SOLUTION INTRAVENOUS at 07:28

## 2022-09-12 RX ADMIN — SODIUM CHLORIDE, POTASSIUM CHLORIDE, SODIUM LACTATE AND CALCIUM CHLORIDE: 600; 310; 30; 20 INJECTION, SOLUTION INTRAVENOUS at 10:25

## 2022-09-12 RX ADMIN — SODIUM CHLORIDE 500 ML: 9 INJECTION, SOLUTION INTRAVENOUS at 23:51

## 2022-09-12 RX ADMIN — BUPIVACAINE HYDROCHLORIDE IN DEXTROSE 2 ML: 7.5 INJECTION, SOLUTION SUBARACHNOID at 08:03

## 2022-09-12 RX ADMIN — METOPROLOL TARTRATE 1 MG: 5 INJECTION INTRAVENOUS at 08:46

## 2022-09-12 RX ADMIN — FENTANYL CITRATE 55 MCG: 50 INJECTION, SOLUTION INTRAMUSCULAR; INTRAVENOUS at 07:26

## 2022-09-12 RX ADMIN — TRANEXAMIC ACID 1 G: 10 INJECTION, SOLUTION INTRAVENOUS at 08:00

## 2022-09-12 RX ADMIN — PROPOFOL 50 MG: 10 INJECTION, EMULSION INTRAVENOUS at 08:25

## 2022-09-12 RX ADMIN — FENTANYL CITRATE 25 MCG: 50 INJECTION, SOLUTION INTRAMUSCULAR; INTRAVENOUS at 07:44

## 2022-09-12 RX ADMIN — SUGAMMADEX 200 MG: 100 INJECTION, SOLUTION INTRAVENOUS at 12:02

## 2022-09-12 RX ADMIN — METOPROLOL TARTRATE 1.5 MG: 5 INJECTION INTRAVENOUS at 08:51

## 2022-09-12 ASSESSMENT — ACTIVITIES OF DAILY LIVING (ADL)
DRESS: 0-->ASSISTANCE NEEDED (DEVELOPMENTALLY APPROPRIATE)
CONCENTRATING,_REMEMBERING_OR_MAKING_DECISIONS_DIFFICULTY: NO
WALKING_OR_CLIMBING_STAIRS: OTHER (SEE COMMENTS)
DOING_ERRANDS_INDEPENDENTLY_DIFFICULTY: YES
WEAR_GLASSES_OR_BLIND: YES
ADLS_ACUITY_SCORE: 35
ADLS_ACUITY_SCORE: 35
TOILETING_ASSISTANCE: TOILETING DIFFICULTY, ASSISTANCE 1 PERSON
TRANSFERRING: 1-->ASSISTANCE (EQUIPMENT/PERSON) NEEDED
ADLS_ACUITY_SCORE: 37
FALL_HISTORY_WITHIN_LAST_SIX_MONTHS: YES
ADLS_ACUITY_SCORE: 37
CHANGE_IN_FUNCTIONAL_STATUS_SINCE_ONSET_OF_CURRENT_ILLNESS/INJURY: YES
TOILETING_ISSUES: YES
ADLS_ACUITY_SCORE: 35
ADLS_ACUITY_SCORE: 37
TRANSFERRING: 1-->ASSISTANCE (EQUIPMENT/PERSON) NEEDED (NOT DEVELOPMENTALLY APPROPRIATE)
DRESSING/BATHING: BATHING DIFFICULTY, ASSISTANCE 1 PERSON
NUMBER_OF_TIMES_PATIENT_HAS_FALLEN_WITHIN_LAST_SIX_MONTHS: 1
TOILETING: 1-->ASSISTANCE (EQUIPMENT/PERSON) NEEDED
WALKING_OR_CLIMBING_STAIRS_DIFFICULTY: YES
TOILETING: 1-->ASSISTANCE (EQUIPMENT/PERSON) NEEDED (NOT DEVELOPMENTALLY APPROPRIATE)
ADLS_ACUITY_SCORE: 33
ADLS_ACUITY_SCORE: 37
DRESSING/BATHING_DIFFICULTY: YES
DRESS: 1-->ASSISTANCE (EQUIPMENT/PERSON) NEEDED
DIFFICULTY_EATING/SWALLOWING: NO
ADLS_ACUITY_SCORE: 33
BATHING: 1-->ASSISTANCE NEEDED

## 2022-09-12 ASSESSMENT — ENCOUNTER SYMPTOMS: BACK PAIN: 1

## 2022-09-12 NOTE — Clinical Note
The first balloon was inserted into the left anterior descending.Max pressure = 12 zulema. Total duration = 45 seconds.     Max pressure = 12 zulema. Total duration = 20 seconds.    Balloon reinflated a second time: Max pressure = 12 zulema. Total duration = 20 seconds.  Balloon reinflated a third time: Max pressure = 12 zulema. Total duration = 50 seconds.

## 2022-09-12 NOTE — ANESTHESIA PREPROCEDURE EVALUATION
Anesthesia Pre-Procedure Evaluation    Patient: Markie Rico   MRN: [id number] : 1960        Procedure : Procedure(s):  Left Total Hip Arthroplasty          Past Medical History:   Diagnosis Date     Diverticulitis      Old myocardial infarction     s/p successful emergent revascualrization, PTCR/stent at the [facility] for acute MI     Old myocardial infarction 11    Non-Q-wave MI-[place]     Other and unspecified hyperlipidemia      Tobacco use disorder       Past Surgical History:   Procedure Laterality Date     ARTHROPLASTY HIP Right 2018    Procedure: ARTHROPLASTY HIP;  right total hip arthroplasty;  Surgeon: Vineet Bedolla MD;  [facility]     COLONOSCOPY N/A 2022    Procedure: COLONOSCOPY;  Surgeon: Mundo Velazquez MD;  [facility]     ZZC ANESTH,DX ARTHROSCOPIC PROC KNEE JOINT       ZZC COLOSTOMY       ZZC UNLISTED LAPAROSCOPY PROC,INTESTINE  2003    Exploratory Lap. Lysis of adhesions. Sigmoid colostomy takedown and stapled coloproctostomy.     ZZHC PTCA SINGLE VESSEL      2 stents      Allergies   Allergen Reactions     No Known Allergies      Orlistat      Other reaction(s): Incontinence of feces      Social History     Tobacco Use     Smoking status: Former Smoker     Quit date: 2000     Years since quittin.7     Smokeless tobacco: Former User   Substance Use Topics     Alcohol use: Not Currently     Comment: Occasional      Wt Readings from Last 1 Encounters:   22 120.7 kg (266 lb)        Anesthesia Evaluation   Pt has had prior anesthetic. Type: General.    No history of anesthetic complications       ROS/MED HX  ENT/Pulmonary:     (+) sleep apnea, mild, uses CPAP,     Neurologic:  - neg neurologic ROS     Cardiovascular: Comment: - Coronary artery disease  - Old myocardial infarction   - Pt on longterm anticoagulants. Off plavix since 2022. PT remains on 81mg Asprin    (+) hypertension--CAD --stent-. Previous cardiac testing   Echo:  Date: Results:    Stress Test: Date: Results:    ECG Reviewed: Date: 09/02/2022 Results:  - NSR w/ incomplete RBBB, 94 BPM    Cath: Date: Results:      METS/Exercise Tolerance:     Hematologic:  - neg hematologic  ROS     Musculoskeletal:   (+) arthritis,     GI/Hepatic:     (+) GERD, Asymptomatic on medication,     Renal/Genitourinary:  - neg Renal ROS     Endo:  - neg endo ROS   (+) Obesity,     Psychiatric/Substance Use:  - neg psychiatric ROS     Infectious Disease:  - neg infectious disease ROS     Malignancy:  - neg malignancy ROS     Other:            Physical Exam    Airway        Mallampati: II   TM distance: > 3 FB   Neck ROM: full   Mouth opening: > 3 cm    Respiratory Devices and Support         Dental  no notable dental history         Cardiovascular   cardiovascular exam normal          Pulmonary   pulmonary exam normal                OUTSIDE LABS:  CBC:   Lab Results   Component Value Date    WBC 12.8 (H) 04/14/2018    WBC 7.4 11/26/2012    HGB 13.4 04/14/2018    HGB 10.0 (L) 02/14/2018    HCT 40.8 04/14/2018    HCT 43.9 11/26/2012     04/14/2018     11/26/2012     BMP:   Lab Results   Component Value Date     04/14/2018     11/26/2012    POTASSIUM 3.7 04/14/2018    POTASSIUM 4.8 01/26/2018    CHLORIDE 107 04/14/2018    CHLORIDE 109 11/26/2012    CO2 25 04/14/2018    CO2 26 11/26/2012    BUN 10 04/14/2018    BUN 14 11/26/2012    CR 0.43 (L) 04/14/2018    CR 0.8 01/26/2018     (H) 04/14/2018     (H) 02/14/2018     COAGS:   Lab Results   Component Value Date    PTT 26 04/14/2018    INR 0.97 04/14/2018     POC: No results found for: BGM, HCG, HCGS  HEPATIC:   Lab Results   Component Value Date    ALBUMIN 3.7 04/14/2018    PROTTOTAL 7.3 04/14/2018    ALT 35 04/14/2018     (H) 04/14/2018    GGT 21 07/29/2003    ALKPHOS 121 04/14/2018    BILITOTAL 0.3 04/14/2018     OTHER:   Lab Results   Component Value Date    ASHLEY 8.8 04/14/2018    PHOS 4.2 07/29/2003    MAG  2.4 (H) 04/14/2018    TSH 2.48 11/26/2012       Anesthesia Plan    ASA Status:  3   NPO Status:  NPO Appropriate    Anesthesia Type: Spinal.      Maintenance: TIVA.        Consents    Anesthesia Plan(s) and associated risks, benefits, and realistic alternatives discussed. Questions answered and patient/representative(s) expressed understanding.     - Discussed: Risks, Benefits and Alternatives for BOTH SEDATION and the PROCEDURE were discussed     - Discussed with:  Patient      - Extended Intubation/Ventilatory Support Discussed: No.      - Patient is DNR/DNI Status: No    Use of blood products discussed: Yes.     - Discussed with: Patient.     Postoperative Care    Pain management: IV analgesics, Oral pain medications, Neuraxial analgesia.   PONV prophylaxis: Ondansetron (or other 5HT-3), Dexamethasone or Solumedrol, Background Propofol Infusion     Comments:    Other Comments: The risks and benefits of anesthesia, and the alternatives where applicable, have been discussed with the patient, and they wish to proceed.                   So Yeon Kang

## 2022-09-12 NOTE — ED PROVIDER NOTES
History     Chief Complaint:  Chest Pain (From [place] had orif on Left developed Chest pain here for cath lab.)      LUCIEN Rico is a 61 year old male who presents with chest pain. He had an elective total left hip replacement done today in [place], [place] when he started to have 9/10 chest, arm, shoulder, and back pain afterwards. He also had elevations in V2, V3, and Avf. He was given 3 nitroglycerin, a full aspirin dose, and 250 of fentanyl en route with flight paramedics.  The patient reports the pain is still present, but improved.  He was also given a unit of blood as his hemoglobin was 9.5 and he had an approximately 650 mL of blood loss during the procedure. He was not given heparin or brilinta. His last blood pressure was 129/85. He stopped taking his Plavix for the surgery. His last coronary stent placement occurred last in 2018.    Review of Systems   Cardiovascular: Positive for chest pain.   Musculoskeletal: Positive for back pain (back, arm, and shoulder pain).   All other systems reviewed and are negative.    Allergies:  No Known Allergies  Orlistat    Medications:    aspirin   Clopidogrel  lisinopril   metoprolol   nitroglycerin  oxycodone  xarelto  rosuvastatin   senna-docusate  tramadol     Past Medical History:    Diverticulitis  Old MI  Tobacco use disorder  Primary osteoarthritis of both hips  History of acute MI  Hyperlipidemia  CAD    Past Surgical History:    Hip arthroplasty  Colonoscopy  Colostomy  Two stents    Family History:    Mother: lipids, psychotic disorder, hypertension  Father: prostate cancer  Sister: diabetes     Social History:  Presents alone  Physical Exam     Patient Vitals for the past 24 hrs:   BP Temp Temp src Pulse Resp SpO2   09/12/22 1456 129/85 98.3  F (36.8  C) Oral 90 16 100 %       Physical Exam  Physical Exam   General:  Sitting on bed by self.   HENT:  No obvious trauma to head  Right Ear:  External ear normal.   Left Ear:  External ear normal.    Nose:  Nose normal.   Eyes:  Conjunctivae and EOM are normal. Pupils are equal, round, and reactive.   Neck: Normal range of motion. Neck supple. No tracheal deviation present.   CV:  Normal heart sounds. No murmur heard.  Pulm/Chest: Effort normal and breath sounds normal.   Abd: Soft. No distension. There is no tenderness. There is no rigidity, no rebound and no guarding.   M/S: Normal range of motion.   Neuro: Alert. GCS 15.  Skin: Skin is warm and dry. No rash noted. Not diaphoretic. surgical dressing on hip is clean, dry, and intact.  Psych: Normal mood and affect. Behavior is normal.   Emergency Department Course   ECG:  ECG taken at 1452, ECG read at 1452  Normal sinus rhythm with sinus arrhythmia   Right bundle branch block  Anteroseptal infarct, possibly acute  Lateral injury pattern  ACUTE MI/STEMI  Abnormal ECG  Rate 87 bpm. AZ interval 136 ms. QRS duration 142 ms. QT/QTc 408/490 ms. P-R-T axes 22 -10 31.     Emergency Department Course:    Reviewed:  I reviewed nursing notes, vitals, past medical history and Care Everywhere    Assessments:  1448 I obtained history and examined the patient as noted above.   1452 EKG confirms STEMI  1458 Patient to cath lab    Disposition:  The patient  was transferred to cath lab under the care of Dr Spain.    Impression & Plan    Medical Decision Making:  Markie Rico is a very pleasant 61 year old year old patient who presents to the emergency department with concern of chest pain.  The patient had a total left hip arthroplasty today.  It went well.  Postoperatively he developed chest pain.  An EKG was obtained that showed a STEMI.  Patient was flown to [facility] and plan was for direct admission to the Cath Lab.  He arrived in the emergency department as there was a short wait for the Cath Lab availability.  EKG was repeated here and continues to show STEMI.  The patient already received a full aspirin.  He received a total of 3 nitroglycerin.  He did have  some hypotension after that so was not repeated here.  He received fentanyl from the paramedics that helped with his chest pain.  He continues to have slight chest pain.  EKG shows an anterior STEMI.  The interventional cardiologist was at bedside when he presented.  They requested to go straight to the Cath Lab once available without providing any heparin or Brilinta and they will do so.  The Cath Lab was available nearly immediately and he was sent to the Cath Lab for PCI.  The patient agrees.  Dr. Spain was the accepting cardiologist    >>>Critical Care time:  Total critical care time exclusive of procedures was 9 minutes for this patient.<<<    Diagnosis:    ICD-10-CM    1. Coronary artery disease involving native coronary artery of native heart without angina pectoris  I25.10 Case Request Cath Lab: Coronary Angiogram, Percutaneous Coronary Intervention     Case Request Cath Lab: Coronary Angiogram, Percutaneous Coronary Intervention     Cardiac Catheterization     Cardiac Catheterization   2. ST elevation myocardial infarction (STEMI), unspecified artery (H)  I21.3          Scribe Disclosure:  Marv CHAU Hired, am serving as a scribe at 2:54 PM on 9/12/2022 to document services personally performed by Seth Lemus DO based on my observations and the provider's statements to me.        Seth Lemus DO  09/12/22 1735

## 2022-09-12 NOTE — CONSULTS
[facility]    Cardiology Consultation       Assessment & Plan     1.  ACS with ST segment changes anteriorly post left hip surgery today  2.  History of PCI to LAD and RCA remotely.  Most recent angiogram is from 2018 where additional PCI to RCA was performed at outside hospital  3.  Status post left hip surgery today  4.  Anemia for which she is receiving 1 unit of transfused packed red blood cells  5.  CT which demonstrates no evidence of pulmonary embolism or thoracic dissection.  6.  Hypertension  7.  Hyperlipidemia  8.  Elevated BMI  9.  Ischemic cardiomyopathy with an ejection fraction of 35% by outside evaluation.        Recommendations    1.  ACS with recent left hip surgery.  EKG suggests anterior STEMI possible stent thrombosis.  We have clarified with anesthesia that patient is okay to receive anticoagulation given his hip surgery recently as well as spinal anesthesia.  Spinal anesthesia completed at 9 AM this morning.  The recommendation that heparin administration should be held for 1 hour.  We are clearly past that point and he is sent urgently for coronary angiography.    2.  Continue with medical stabilization.  Further recommendations after coronary anatomy is known.      Greater than 30 minutes critical care time coronary his care.  Discussion with outside physician regarding heparin administration as well as medical stabilization was made.  Patient was met in the ER and facilitated his transport to the cardiac Cath Lab.        Too Spain MD, MD          HPI:      Patient is a 61-year-old gentleman with an early onset of coronary artery disease.  Remotely he had PCI to his LAD and RCA in the [employer] system.  His most recent angiogram is from 2018 at [place] [facility].  At that time he had presented with chest pain and underwent VIRGINIE to his LAD x2 and further treatment with PCI to his RCA 48 hours later.  It appears that circumflex required FFR however this was  not hemodynamically significant.  He has been left with a cardiomyopathy with ejection fraction 35%.  He was undergoing left hip surgery today at [facility].  Following the surgery he developed acute onset of chest pain EKG demonstrated a STEMI pattern and he was urgently transferred to [facility] for additional care.  Medical stabilization has been attempted with high-dose nitroglycerin and analgesics with continued onset of pain.  Given the fact that he had complained of back pain with the chest pain a CTA was performed and this did not demonstrate any evidence of aortic dissection.  Sent urgently for evaluation.          Primary Care Physician   [facility]      Patient Active Problem List   Diagnosis     History of acute myocardial infarction of inferior wall     Hyperlipidemia LDL goal <100     Morbid obesity (H)     Multiple joint pain     CAD (coronary artery disease)     Medication side effect     Benign essential hypertension     Primary osteoarthritis of both hips     Status post total replacement of right hip     Primary osteoarthritis of left hip       Past Medical History   I have reviewed this patient's medical history and updated it with pertinent information if needed.   Past Medical History:   Diagnosis Date     Diverticulitis      Old myocardial infarction     s/p successful emergent revascualrization, PTCR/stent at the LAD for acute MI     Old myocardial infarction 06/07/11    Non-Q-wave MI-[facility]     Other and unspecified hyperlipidemia      Tobacco use disorder        Past Surgical History   I have reviewed this patient's surgical history and updated it with pertinent information if needed.  Past Surgical History:   Procedure Laterality Date     ARTHROPLASTY HIP Right 2/12/2018    Procedure: ARTHROPLASTY HIP;  right total hip arthroplasty;  Surgeon: Vineet Bedolla MD;  Location: [facility] OR     COLONOSCOPY N/A 5/20/2022    Procedure: COLONOSCOPY;  Surgeon: Caroline  MD Mundo;  Location: [facility] GI     [facility] ANESTH,DX ARTHROSCOPIC PROC KNEE JOINT       [facility] COLOSTOMY       [facility] UNLISTED LAPAROSCOPY PROC,INTESTINE  2003    Exploratory Lap. Lysis of adhesions. Sigmoid colostomy takedown and stapled coloproctostomy.     [facility] PTCA SINGLE VESSEL      2 stents       Prior to Admission Medications   Cannot display prior to admission medications because the patient has not been admitted in this contact.         Allergies   Allergies   Allergen Reactions     No Known Allergies      Orlistat      Other reaction(s): Incontinence of feces       Social History    reports that he quit smoking about 22 years ago. He has quit using smokeless tobacco. He reports previous alcohol use. He reports previous drug use.    Family History   Family History   Problem Relation Age of Onset     Lipids Mother      Psychotic Disorder Mother         anxiety     Hypertension Mother      Prostate Cancer Father          at 74     Diabetes Sister        Review of Systems   The comprehensive 10 point Review of Systems is negative other than noted in the HPI or here.     Physical Exam   Vital Signs with Ranges  Temp:  [97  F (36.1  C)-98.4  F (36.9  C)] 98.2  F (36.8  C)  Pulse:  [72-86] 79  Resp:  [0-29] 18  BP: ()/() 126/69  SpO2:  [93 %-100 %] 100 %  Wt Readings from Last 4 Encounters:   22 120.7 kg (266 lb)   22 120.7 kg (266 lb)   22 120.7 kg (266 lb)   20 131.5 kg (290 lb)     No intake/output data recorded.      Vitals: There were no vitals taken for this visit.    GENERAL: Healthy, alert and no distress  EYES: Eyes grossly normal to inspection.  No discharge or erythema, or obvious scleral/conjunctival abnormalities.  RESP: No audible wheeze, cough, or visible cyanosis.  No visible retractions or increased work of breathing.    SKIN: Visible skin clear. No significant rash, abnormal pigmentation or lesions.  NEURO: Cranial nerves grossly intact.  Mentation and speech  appropriate for age.  PSYCH: Mentation appears normal, affect normal/bright, judgement and insight intact, normal speech and appearance well-groomed.    No lab results found in last 7 days.    Invalid input(s): TROPONINIES    Recent Labs   Lab 09/12/22  1244   WBC 21.1*   HGB 9.5*   MCV 92         POTASSIUM 3.9   CHLORIDE 109   CO2 19*   BUN 15   CR 0.47*   GFRESTIMATED >90   ANIONGAP 10   ASHLEY 7.6*   *   ALBUMIN 2.3*   PROTTOTAL 4.5*   BILITOTAL 0.2   ALKPHOS 55   ALT 19   AST 15     Recent Labs   Lab Test 01/26/18  0000 10/18/17  0000   CHOL 321* 272*   HDL 39* 39*   * 184   TRIG 209* 245*     Recent Labs   Lab 09/12/22  1244   WBC 21.1*   HGB 9.5*   HCT 28.6*   MCV 92        No results for input(s): PH, PHV, PO2, PO2V, SAT, PCO2, PCO2V, HCO3, HCO3V in the last 168 hours.  No results for input(s): NTBNPI, NTBNP in the last 168 hours.  No results for input(s): DD in the last 168 hours.  No results for input(s): SED, CRP in the last 168 hours.  Recent Labs   Lab 09/12/22  1244        No results for input(s): TSH in the last 168 hours.  No results for input(s): COLOR, APPEARANCE, URINEGLC, URINEBILI, URINEKETONE, SG, UBLD, URINEPH, PROTEIN, UROBILINOGEN, NITRITE, LEUKEST, RBCU, WBCU in the last 168 hours.    Imaging:  Recent Results (from the past 48 hour(s))   CT Chest Pulmonary Embolism w Contrast    Narrative    CT CHEST PULMONARY EMBOLISM WITH CONTRAST 9/12/2022 1:44 PM    CLINICAL HISTORY: Postoperative chest pain  TECHNIQUE: CT angiogram chest during arterial phase injection IV  contrast. 2D and 3D MIP reconstructions were performed by the CT  technologist. Dose reduction techniques were used.     CONTRAST: 100 ml ISOVUE-370    COMPARISON: None.    FINDINGS:  ANGIOGRAM CHEST: Pulmonary arteries are normal caliber and negative  for pulmonary emboli. Thoracic aorta is negative for dissection. No CT  evidence of right heart strain.    LUNGS AND PLEURA: Groundglass densities  are noted in the bilateral  lungs which could represent mild vascular congestion. No pneumothorax,  infiltrate or effusion is identified. No significant nodule is seen.    MEDIASTINUM/AXILLAE: Heart is upper normal size. There are extensive  coronary artery calcifications and/or stents. There are calcifications  around the aortic valve annulus. There is ectasia/mild aneurysmal  dilatation of the aortic root at 4.4 cm in diameter. Ascending  thoracic aorta measures up to 3.9 cm in diameter and is mildly  ectatic. No mediastinal, hilar, or axillary lymphadenopathy is  identified. Visualized portions of the thyroid are unremarkable.    UPPER ABDOMEN: A few calcifications are seen in the inferior right  lobe of the liver likely represent chronic granulomatous disease or  prior injury. Tiny nonobstructive intrarenal calculi measure less than  0.3 cm bilaterally. Visualized portions of the upper abdominal  contents are otherwise grossly unremarkable. Evaluation of the solid  organs of the upper abdomen is limited due to lack of IV contrast.    MUSCULOSKELETAL: There are severe degenerative changes of the  bilateral shoulders. Degenerative changes of the spine are also noted.  No aggressive osseous lesions or acute osseous fractures are  identified. Ossified intra-articular free bodies are seen in the  bilateral shoulders, worse in the left shoulder.      Impression    IMPRESSION:  1.  No evidence for pulmonary artery embolism is identified.  2.  Mild groundglass densities in the lungs could represent vascular  congestion.  3.  Extensive coronary artery calcifications and/or stents. This could  be associated with the patient's recent symptoms.  4.  Severe degenerative changes of the bilateral shoulders.  Degenerative changes of the spine are also noted.   XR Pelvis Port 1/2 Views    Narrative    PELVIS PORTABLE ONE VIEW   9/12/2022 1:46 PM     HISTORY: Status post hip surgery    COMPARISON: Pelvis and left hip x-rays dated  8/4/2022.      Impression    IMPRESSION:  1. Single view of the lower pelvis was provided demonstrating new left  hip arthroplasty. Hardware components are well seated without evidence  for hardware failure or periprosthetic fracture. Gas is seen in the  soft tissues around left hip, consistent with recent operative status.    2. Right hip arthroplasty components are again noted and demonstrate  no evidence for hardware failure or periprosthetic fracture.  3. Only a single view was obtained due to patient issues at the time  of the exam.       Echo:  No results found for this or any previous visit (from the past 4320 hour(s)).

## 2022-09-12 NOTE — ED PROVIDER NOTES
History     Chief Complaint   Patient presents with     Chest Pain     HPI  Markie Rico is a 61 year old male who presents to the emergency room from PACU after rapid response.  Patient has a history of osteoarthritis of his left hip, was undergoing total left hip arthroplasty.  While in PACU, when recovering from anesthesia, started complaining of severe chest pain radiating to his back.  Rapid response was called.  He had received 3 sublingual nitroglycerin without any relief of his chest pain.  EKG was obtained, and noted ST changes in leads V2 and V3 which were not present on patient's preop EKG.  Anesthesia at bedside, noted that patient did have 650 cc blood loss during procedure, otherwise no complications.  He was recovering well until chest pain started.  Noted some mild hypotension after being given nitroglycerin.    Allergies:  Allergies   Allergen Reactions     No Known Allergies      Orlistat      Other reaction(s): Incontinence of feces       Problem List:    Patient Active Problem List    Diagnosis Date Noted     Primary osteoarthritis of left hip 06/20/2019     Priority: Medium     Status post total replacement of right hip 02/12/2018     Priority: Medium     Benign essential hypertension 11/30/2017     Priority: Medium     Primary osteoarthritis of both hips 11/30/2017     Priority: Medium     History of acute myocardial infarction of inferior wall 11/23/2012     Priority: Medium     Hyperlipidemia LDL goal <100 11/23/2012     Priority: Medium     Morbid obesity (H) 11/23/2012     Priority: Medium     Multiple joint pain 11/23/2012     Priority: Medium     CAD (coronary artery disease) 11/23/2012     Priority: Medium     Medication side effect 11/23/2012     Priority: Medium     lisinopril cough possibly          Past Medical History:    Past Medical History:   Diagnosis Date     Diverticulitis      Old myocardial infarction      Old myocardial infarction 06/07/11     Other and unspecified  hyperlipidemia      Tobacco use disorder        Past Surgical History:    Past Surgical History:   Procedure Laterality Date     ARTHROPLASTY HIP Right 2018    Procedure: ARTHROPLASTY HIP;  right total hip arthroplasty;  Surgeon: Vineet Bedolla MD;  Location: [facility] OR     COLONOSCOPY N/A 2022    Procedure: COLONOSCOPY;  Surgeon: Mundo Velazquez MD;  Location: [facility] GI     [facility] ANESTH,DX ARTHROSCOPIC PROC KNEE JOINT       [facility] COLOSTOMY       [facility] UNLISTED LAPAROSCOPY PROC,INTESTINE  2003    Exploratory Lap. Lysis of adhesions. Sigmoid colostomy takedown and stapled coloproctostomy.     [facility] PTCA SINGLE VESSEL      2 stents       Family History:    Family History   Problem Relation Age of Onset     Lipids Mother      Psychotic Disorder Mother         anxiety     Hypertension Mother      Prostate Cancer Father          at 74     Diabetes Sister        Social History:  Marital Status:   [2]  Social History     Tobacco Use     Smoking status: Former Smoker     Quit date: 2000     Years since quittin.7     Smokeless tobacco: Former User   Vaping Use     Vaping Use: Never used   Substance Use Topics     Alcohol use: Not Currently     Comment: Occasional     Drug use: Not Currently        Medications:    acetaminophen (TYLENOL) 325 MG tablet  acetaminophen (TYLENOL) 325 MG tablet  aspirin (ASA) 81 MG chewable tablet  Cinnamon Bark POWD  clopidogrel (PLAVIX) 75 MG tablet  MAGNESIUM OXIDE PO  Multiple Vitamins-Minerals (CENTRUM MEN PO)  nitroglycerin (NITROSTAT) 0.4 MG SL tablet  oxyCODONE (ROXICODONE) 5 MG tablet  POTASSIUM CHLORIDE CR PO  rivaroxaban ANTICOAGULANT (XARELTO) 10 MG TABS tablet  senna-docusate (SENOKOT-S/PERICOLACE) 8.6-50 MG tablet  traMADol (ULTRAM) 50 MG tablet  TURMERIC PO  clopidogrel (PLAVIX) 75 MG tablet  lisinopril (PRINIVIL,ZESTRIL) 10 MG tablet  metoprolol (TOPROL XL) 50 MG 24 hr tablet  order for DME  rosuvastatin (CRESTOR) 40 MG tablet          Review of Systems    All other systems reviewed and are negative.      Physical Exam   BP: 136/81  Pulse: 76  Temp: 98.2  F (36.8  C)  Resp: 16  Weight: 120.7 kg (266 lb)  SpO2: 94 %      Physical Exam  Vitals and nursing note reviewed.   Constitutional:       Appearance: He is obese. He is not diaphoretic.   HENT:      Head: Atraumatic.   Eyes:      General: No scleral icterus.     Pupils: Pupils are equal, round, and reactive to light.   Cardiovascular:      Rate and Rhythm: Normal rate and regular rhythm.      Heart sounds: Normal heart sounds.   Pulmonary:      Effort: Pulmonary effort is normal. No respiratory distress.      Breath sounds: Normal breath sounds.   Abdominal:      General: Bowel sounds are normal.      Palpations: Abdomen is soft.      Tenderness: There is no abdominal tenderness.   Musculoskeletal:      Left hip: Tenderness present. Decreased range of motion.      Comments: Immediately status post left total hip arthroplasty   Skin:     General: Skin is warm.      Coloration: Skin is pale.      Findings: No rash.         ED Course                 Procedures              EKG Interpretation:      Interpreted by Zohra Posada DO  Time reviewed: 1245  Symptoms at time of EKG: Post op, chest pain   Rhythm: normal sinus   Rate: Normal  Axis: Normal  Ectopy: none  Conduction: right bundle branch block (incomplete)  ST Segments/ T Waves: ST Segement Elevation V2 and V3  Q Waves: nonspecific  Comparison to prior: Right bundle branch block without ST elevations, preop EKG from 9/2/22    Clinical Impression: myocardial ischemia                Critical Care time:  was 25 minutes for this patient excluding procedures.     The patient has a STEMI     The patient was evaluated at 1240   Cath lab transfer was delayed due to atypical presentation and was delayed due to time taken to rule out Aortic Dissection (REYNALDO, CT)   ASA given in the PACU                       Results for orders placed or performed during the hospital  encounter of 09/12/22 (from the past 24 hour(s))   ABO/Rh type and screen    Narrative    The following orders were created for panel order ABO/Rh type and screen.  Procedure                               Abnormality         Status                     ---------                               -----------         ------                     Adult Type and Screen[[id number]]                            Final result                 Please view results for these tests on the individual orders.   Adult Type and Screen   Result Value Ref Range    ABO/RH(D) O NEG     Antibody Screen Negative Negative    SPECIMEN EXPIRATION DATE [id number]    Extra Tube ([place] Draw)    Narrative    The following orders were created for panel order Extra Tube ([place] Draw).  Procedure                               Abnormality         Status                     ---------                               -----------         ------                     Extra Blue Top Tube[[id number]]                              Final result               Extra Green Top (Lithium...[[id number]]                      Final result               Extra Purple Top Tube[[id number]]                            Final result                 Please view results for these tests on the individual orders.   Extra Blue Top Tube   Result Value Ref Range    Hold Specimen JIC    Extra Green Top (Lithium Heparin) Tube   Result Value Ref Range    Hold Specimen JIC    Extra Purple Top Tube   Result Value Ref Range    Hold Specimen JIC    Comprehensive metabolic panel   Result Value Ref Range    Sodium 138 133 - 144 mmol/L    Potassium 3.9 3.4 - 5.3 mmol/L    Chloride 109 94 - 109 mmol/L    Carbon Dioxide (CO2) 19 (L) 20 - 32 mmol/L    Anion Gap 10 3 - 14 mmol/L    Urea Nitrogen 15 7 - 30 mg/dL    Creatinine 0.47 (L) 0.66 - 1.25 mg/dL    Calcium 7.6 (L) 8.5 - 10.1 mg/dL    Glucose 190 (H) 70 - 99 mg/dL    Alkaline Phosphatase 55 40 - 150 U/L    AST 15 0 - 45 U/L    ALT 19 0 - 70 U/L     Protein Total 4.5 (L) 6.8 - 8.8 g/dL    Albumin 2.3 (L) 3.4 - 5.0 g/dL    Bilirubin Total 0.2 0.2 - 1.3 mg/dL    GFR Estimate >90 >60 mL/min/1.73m2   Prepare red blood cells (unit)   Result Value Ref Range    Blood Component Type Red Blood Cells     Product Code V9241G57     Unit Status Transfused     Unit Number [id number]     CROSSMATCH Compatible     CODING SYSTEM [id number]     ISSUE DATE AND TIME [id number]     UNIT ABO/RH O-     UNIT TYPE ISBT 9500    XR Pelvis Port 1/2 Views    Narrative    PELVIS PORTABLE ONE VIEW   9/12/2022 1:46 PM     HISTORY: Status post hip surgery    COMPARISON: Pelvis and left hip x-rays dated 8/4/2022.      Impression    IMPRESSION:  1. Single view of the lower pelvis was provided demonstrating new left  hip arthroplasty. Hardware components are well seated without evidence  for hardware failure or periprosthetic fracture. Gas is seen in the  soft tissues around left hip, consistent with recent operative status.    2. Right hip arthroplasty components are again noted and demonstrate  no evidence for hardware failure or periprosthetic fracture.  3. Only a single view was obtained due to patient issues at the time  of the exam.       Medications   ropivacaine (NAROPIN) 300 mg, ketorolac (TORADOL) 30 mg, EPINEPHrine (ADRENALIN) 0.6 mg in sodium chloride 0.9 % 100 mL (ORTHO SUNDEEP STANDARD DOSE) (has no administration in time range)   ropivacaine (NAROPIN) 150 mg, ketorolac (TORADOL) 30 mg, EPINEPHrine (ADRENALIN) 0.6 mg in sodium chloride 0.9 % 50 mL (ORTHO SUNDEEP LOW DOSE) ( INTRA-ARTICULAR Given 9/12/22 1043)   aspirin (ASA) chewable tablet 324 mg (324 mg Oral Given 9/12/22 1251)   ceFAZolin Sodium (ANCEF) injection 3 g (3 g Intravenous Given 9/12/22 0800)   tranexamic acid 1 g in 100 mL NS IV bag (premix) (1 g Intravenous Given 9/12/22 1105)   iopamidol (ISOVUE-370) solution 500 mL (100 mLs Intravenous Given 9/12/22 1340)   sodium chloride 0.9 % bag 100mL for CT scan flush use (70 mLs  Intravenous Given 9/12/22 3983)       Assessments & Plan (with Medical Decision Making)  Markie is a 61-year-old male presenting to the ER from PACU as a rapid response after undergoing left hip arthroplasty, sudden onset of chest pain.  Found to have acute coronary syndrome and EKG changes, will be transferring to higher level of care  61-year-old male was seen and examined initially in PACU area.  He was groggy but answering questions appropriately, no neurologic deficits.  Was complaining of substernal chest pain with radiation to his back.  Had been given 3 sublingual nitro and had a EKG performed prior to rapid response being called.  Rapid response was called due to the noticed EKG changes as well as difficulty with drawing lab work.  Lab was at the bedside and andre rainbow, this was sent for CBC and troponin.  Ordered for CT PE study, and after CT patient was brought immediately to the ER.  Had already contacted cardiology who reviewed the most recent EKG and noticed concerning changes in leads V1, V2, V3.  They recommended the patient be transferred to the ER at [facility] for immediate evaluation, repeat EKG, and determine if he needs to go directly to the Cath Lab.  Agreed with plan to transfuse 1 unit of packed red blood cells.  Spoke with ED provider at [facility] who accepted the patient in transfer.  Called for air care to transport the patient emergently, and patient left the ED, blood to be hung in route, troponin and CT results pending.  He left in stable but critical condition.  Dr. Spain called back and had questions regarding patient's ability to receive heparin after getting spinal anesthesia.  Called and spoke with Chaz Blanco CRNA, and he stated that patient should be able to receive heparin 1 hour after completion of spinal anesthesia, and his spinal was completed earlier this morning, while outside the 1 hour window.  Called Dr. Spain back on his personal cell phone and  updated him on this recommendation from anesthesia.    **Dr. Briseno called and is aware that the patient is going to be transferring.  He request that he be consulted to round on the patient at [facility] rather than outside group [facility]**     I have reviewed the nursing notes.    I have reviewed the findings, diagnosis, plan and need for follow up with the patient.       Discharge Medication List as of 9/12/2022  2:13 PM      START taking these medications    Details   !! acetaminophen (TYLENOL) 325 MG tablet Take 2 tablets (650 mg) by mouth every 6 hours as needed for other (mild pain), Disp-100 tablet, R-0, E-Prescribe      oxyCODONE (ROXICODONE) 5 MG tablet Take 1-2 tablets (5-10 mg) by mouth every 4 hours as needed for moderate to severe pain, Disp-30 tablet, R-0, E-Prescribe      rivaroxaban ANTICOAGULANT (XARELTO) 10 MG TABS tablet Take 1 tablet (10 mg) by mouth daily, Disp-30 tablet, R-0, E-PrescribeFor DVT Prevention      senna-docusate (SENOKOT-S/PERICOLACE) 8.6-50 MG tablet Take 1-2 tablets by mouth 2 times daily Take while on oral narcotics to prevent or treat constipation., Disp-30 tablet, R-0, E-PrescribeWhile taking narcotics       !! - Potential duplicate medications found. Please discuss with provider.          Final diagnoses:   Morbid obesity (H)   ACS (acute coronary syndrome) (H)   S/P total left hip arthroplasty   Coronary artery disease involving native heart with angina pectoris, unspecified vessel or lesion type (H)       8/10/2022   [facility] EMERGENCY DEPT     Zohra Posada,   09/12/22 3266

## 2022-09-12 NOTE — Clinical Note
The first balloon was inserted into the left anterior descending.Max pressure = 20 zulema. Total duration = 55 seconds.     Max pressure = 20 zulema. Total duration = 30 seconds.    Balloon reinflated a second time: Max pressure = 20 zulema. Total duration = 30 seconds.  Balloon reinflated a third time: Max pressure = 20 zulema. Total duration = 50 seconds. Max pressure = 20 zulema. Total duration = 30 seconds.

## 2022-09-12 NOTE — OP NOTE
Date of Procedure: September 12, 2022    PREOPERATIVE DIAGNOSIS: Severe osteoarthritis, left hip.     POSTOPERATIVE DIAGNOSIS: Severe osteoarthritis, left hip.     PROCEDURE: Left total hip arthroplasty.     SURGEON: Vineet Briseno MD     FIRST ASSISTANT: MEY Falcno     ANESTHESIA: Spinal..     INDICATIONS: Markie Rico is a 61 year old male with severe osteoarthritis of the left hip. Severe pain. He appeared to have a near auto-fusion of the hip, with a 30+degree flexion contracture, an external rotation contracture and an abduction contracture.  He had failed all conservative measures. Informed consent was obtained for the procedure.   DETAILS OF PROCEDURE: The patient was taken to the operating room, placed on the operating table in supine position. Spinal anesthesia was induced. The patient was then placed in the right lateral decubitus position with the left side up and was secured in the Lara hip mcleod.  The hip and leg were then prepped and draped in the usual sterile fashion.  Pause for site confirmation was performed.   A posterolateral approach to the hip was performed by making an incision centered on the tip of the greater trochanter.  Incision was taken down through the skin and the subcutaneous tissue to the level of the fascia.  The incision was made quite a bit longer than normal because the anticipated problems with exposure.  The fascia was incised.  Since there was no internal rotation of the hip I had to partially release the gluteus merary and had to release the quadratus and the the short external rotators, including the piriformis by hugging the greater trochanter and releasing along the bone.  I placed a cobra retractor superiorly underneath the gluteus medius piriformis was very tight was released and then the piriformis was tagged.  At this point there was maybe 10 degrees of internal rotation despite all the soft tissue releases.  Then I proceeded to T the capsule, and  tag it.  Prior to dislocation I could not check the leg lengths through the drapes because of his abduction contracture. I made a drill hole in the greater trochanter and with the drill bit in place measured the distance from there to a spot on the skin proximal to the hip. I measured this distance.  I did note once again that there was an abduction contracture that would later affect how we measure the length once the abduction contracture was eliminated.  I also made note of the amount of extensor tightness with the hip at 0 degrees of extension and the knee flexed.  Getting to this point of the case took additional time and dissection, because of the challenges with his body mass index and the severe contractures.  I did encounter a significant venous bleeder in the quadratus, and was able to control that.  I excised the posterior labrum prior to dislocation.  At this point, I carefully dislocated the hip, to avoid fracturing the femur.  I could not fully internally rotate the femur at this point so I made a subcapital femoral neck osteotomy.  Then I made a femoral neck cut about 1/2 finger-breadth above the lesser trochanter. The head was removed and was seen to have complete wear and irregularity of the femoral head. I placed retractors.I then performed an anterior capsular release.  Later on in the case my anterior retractor did break off a large portion of anterior osteophyte.   There was no residual articular cartilage.  There was a very thick internal osteophyte.  Finally, we were ready to start reaming process. We reamed centrally to start to deal with a very large central osteophyte, starting with a size 52 reamer.  Because of his body habitus there was some shifting of his position within the hip mcleod.  I had the bed tilted to a point where I provided perfectly vertical.  I used the transverse acetabular ligament as a guide and then directing the reamers in the proper orientation I reamed up to size  60, as templated. It seemed to have good fit.  I removed a huge osteophytes inferiorly and posteriorly with an osteotome trial with very solid.  The trial was removed and I did touch the rim with a 61 mm reamer.  I implanted a size 60 mm TriTanium cup into place with extremely good fit.  There was a little bony overhang anteriorly and metal overhang posteriorly.  There was some bony overhang superiorly, most of which which was attributed to a large superior osteophyte.  I felt that no acetabular screw was needed.  I then placed a neutral liner for a size 36 had and placed this into the acetabulum.   We then turned our attention towards the femur. We used the cookie cutter and the canal finder and then used a broach only technique for the Accolade II system broaching up to a size 8 where a good fit was obtained. I trialed this and I felt we had fairly good stability with a +3 neck length. I also checked the leg lengths.  Through the drapes leg seem close to equal, and based on my measurements, it appeared that we had lengthened the leg quite a bit.  We were then reminded that the predislocation measurement was made with the hip in the abducted position due to the abduction contracture.  Taking this into account we had not lengthened it more than a couple of millimeters.  I was not happy with the stability though so I switched to the high offset option and we did have better stability.  Thus, I implanted the actual size 8 Accolade II stem into place with excellent fit.  I should mention that he had a size 6 on the opposite side.  I trialed the construct again and felt that he had 35 to 40 degrees of anteversion and may be slightly more than 45 degrees of abduction.  I placed the actual +5 head on the clean and dried the Taveras taper.  We reduced the hip. We did have some pistoning, again the leg lengths seemed to be good and they seemed to be good by all the parameters I had previously mentioned.  There was stability  with flexion to 90 degrees + internal rotation to about 65 degrees. With full adduction added, there was internal rotation to about 30 degrees before subluxation started.  At 70 degrees of flexion there was about 70 degrees of internal rotation before subluxation occurred.  The total system construct anteversion was about 35-40 degrees.  No subluxation in extension and external rotation. . I should mention that the stem angle was 127 degrees valgus. The wound as it had been several times during the case was irrigated with antibiotic saline solution.  Dilute Betadine solution was placed in the wound and allowed to soak for 2 minutes.  And then more irrigation with the jet lavage was performed.  Joint block solution was injected into the anterior's, superior and inferior capsule.  As well as the surrounding soft tissues.  The posterior capsule was repaired and the capsule along with the piriformis were reattached to the greater trochanter with #1 Ethibond sutures through drill holes.  The piriformis and capsule were quite tight as would be expected with the contractures.  I had placed 1 g of vancomycin powder prior to reattaching the structures to the greater trochanter.  The deep fascia was closed with interrupted #1 Ethibond sutures placed in a figure-of-eight fashion, followed by a running #1 strata fix suture.  This was followed by layered closure of 0 and 2-0 Vicryl sutures in the extensive subcutaneous layer. Running 3-0 Monocryl suture placed in the subcuticular layer and Dermabond on the skin. Sterile dressings were applied. Abduction pillow placed. He was awakened, transferred to the hospital cart and to the recovery area in stable condition.   The entire case was made much more difficult by his contractures and his body habitus.  This made the surgical approach, preparation, implantation and closure much more time-consuming and required more surgical maneuvering.  A 22 modifier requested for this  procedure.  EBL was 650 cc.  PLAN: Weightbearing as tolerated with standard posterior approach precautions.   Components: [place]: Accolade 127 degree size 5; cup Tritanium 60mm, X3 poly, 36mm head, Biolox-Delta ceramic.    DANIELLA BALDERAS MD

## 2022-09-12 NOTE — PRE-PROCEDURE
GENERAL PRE-PROCEDURE:   Procedure:  Heart catheterization possible intervention  Date/Time:  9/12/2022 3:03 PM    Written consent obtained?: Yes    Risks and benefits: Risks, benefits and alternatives were discussed    Patient states understanding of procedure being performed: Yes    Patient's understanding of procedure matches consent: Yes    Procedure consent matches procedure scheduled: Yes    Appropriately NPO:  Yes  Lungs:  Lungs clear with good breath sounds bilaterally  Heart:  Normal heart sounds and rate  History & Physical reviewed:  History and physical reviewed and no updates needed  Statement of review:  I have reviewed the lab findings, diagnostic data, medications, and the plan for sedation  risk benefit  Indication for cath poss intervention discussed  With pt  In addition we  Spoke to anesthesilogist  And pt aware there could  Be issue with blood thinner if pci and recent spinal injection,spine bleed paralysis  etc  Pt aware and anesthesia said ok to move forward given  Several hours from spinal tab  Pt reports ? [facility] stayed  On ASA thruout  Stopped tob 10yr ago and even tho all our LDL thru 2018 abnl he said   [facility] LDL tests ok

## 2022-09-12 NOTE — Clinical Note
The first balloon was inserted into the left anterior descending.Max pressure = 15 zulema. Total duration = 30 seconds.     Max pressure = 15 zulema. Total duration = 30 seconds.    Balloon reinflated a second time: Max pressure = 15 zulema. Total duration = 30 seconds.  Balloon reinflated a third time: Max pressure = 15 zulema. Total duration = 25 seconds.  Balloon reinflated a fourth time: Max pressure = 15 zulema. Total duration = 25 seconds.

## 2022-09-12 NOTE — ANESTHESIA PROCEDURE NOTES
Airway       Patient location during procedure: OR  Staff -        Performed By: CRNA  Consent for Airway        Urgency: elective  Indications and Patient Condition       Indications for airway management: romi-procedural       Induction type:intravenous       Mask difficulty assessment: 1 - vent by mask    Final Airway Details       Final airway type: endotracheal airway       Successful airway: ETT - single  Endotracheal Airway Details        ETT size (mm): 8.0       Cuffed: yes       Successful intubation technique: video laryngoscopy       VL Blade Size: Glidescope 4       Grade View of Cords: 2       Adjucts: stylet       Position: Right       Measured from: lips       Secured at (cm): 24       Bite block used: Oral Airway    Post intubation assessment        Placement verified by: capnometry, equal breath sounds and chest rise        Number of attempts at approach: 1       Number of other approaches attempted: 0       Secured with: plastic tape       Ease of procedure: easy       Dentition: Intact and Unchanged

## 2022-09-12 NOTE — OR NURSING
1209 pt received from OR into pacu postLEFT TOTAL HIP ARTHROPLASTY - Left  General  Spinal anesthesia initial, but converted to general for increased case time. EBL 650mg    pt awake, drowsy but c/o 9/10 chest pain and pressure with pain down both arms. Pt with pmh with cardiac stents. States pain felt like it did with his previous heart issues. Report from OR, CRNA noted potential ST elevation.   12 lead EKG obtained and call to anesthesia regarding above. Pt given fent for pain with little relief. Order for nitroglycerin and pt stating decreased pain after second nitroglycerin. Pt sys pressure dip into 90's after second dose of nitroglycerin which came back up after 10 minutes. Rapid Response called for pt. Pt on 10 liters simple mask at 100%  Pt pain increasing after 15min second nitroglycerin. Third given. Pt lab draws, xray for hip obtained. Asa given per [facility] protocol. Orders for pt to CT to r/o PE, pt with hx of asa and plavix use stopped past Saturday (3 days) for surgery today.    Total of 200mcg fent given to pt throughout process, last two doses given (50mcg on way to CT and last 50mcg per request of CRNA/ED MD right before departure with [facility])    Pt left hip dressing remains clean dry and intact. Full sensation to lower extremities. Denies feeling need to void. Xray hip completed. Able to move feet and toes. Pt denies s/s of Local Anesthesic toxicity.     See emr labs reviewed, hgb drop from 14+ to 9. Report given to ED nurse and [facility] Nurses.     Cherry Funez RN on 9/12/2022 at 3:06 PM

## 2022-09-12 NOTE — ANESTHESIA CARE TRANSFER NOTE
Patient: Markie Rico    Procedure: Procedure(s):  LEFT TOTAL HIP ARTHROPLASTY       Diagnosis: Primary osteoarthritis of left hip [M16.12]  Diagnosis Additional Information: No value filed.    Anesthesia Type:   Spinal     Note:    Oropharynx: oropharynx clear of all foreign objects and spontaneously breathing  Level of Consciousness: drowsy  Oxygen Supplementation: face mask    Independent Airway: airway patency satisfactory and stable  Dentition: dentition unchanged  Vital Signs Stable: post-procedure vital signs reviewed and stable  Report to RN Given: handoff report given  Patient transferred to: PACU    Handoff Report: Identifed the Patient, Identified the Reponsible Provider, Reviewed the pertinent medical history, Discussed the surgical course, Reviewed Intra-OP anesthesia mangement and issues during anesthesia, Set expectations for post-procedure period and Allowed opportunity for questions and acknowledgement of understanding      Vitals:  Vitals Value Taken Time   /147 09/12/22 1209   Temp 98.4  F (36.9  C) 09/12/22 1209   Pulse 74 09/12/22 1210   Resp 8 09/12/22 1210   SpO2 99 % 09/12/22 1210   Vitals shown include unvalidated device data.    Electronically Signed By: ANY Geiger CRNA  September 12, 2022  12:11 PM

## 2022-09-12 NOTE — ANESTHESIA PROCEDURE NOTES
Intrathecal Procedure Note    Pre-Procedure   Staff -        CRNA: Chaz Blanco APRN CRNA       Other Anesthesia Staff: Kang, So Yeon       Performed By: CRNA       Location: OR       Procedure Start/Stop Times: 9/12/2022 7:30 AM and 9/12/2022 8:20 AM       Pre-Anesthestic Checklist: patient identified, IV checked, risks and benefits discussed, informed consent, monitors and equipment checked and pre-op evaluation  Timeout:       Correct Patient: Yes        Correct Procedure: Yes        Correct Site: Yes        Correct Position: Yes   Procedure Documentation  Procedure: intrathecal       Patient Position: sitting       Patient Prep/Sterile Barriers: sterile gloves, mask, patient draped       Skin prep: Betadine       Insertion Site: L3-4. (midline approach).       Needle Length (Inches): 5        Spinal Needle Type: Lynnette tip       Introducer used (17 ga toughy needle)       # of attempts: 2 and  # of redirects:  1    Assessment/Narrative         Paresthesias: Resolved.       CSF fluid: clear.       Opening pressure was cmH2O while  Sitting.      Medication(s) Administered   Medication Administration Time: 9/12/2022 7:30 AM     Comments:  A CSE kit was used for training purposes, with a 17 ga toughy needle used with LORT noted at 8 cm.  A 5in pencan needle was used with CSF noted, however pt did experience paraesthesia. Toughy needle repositioned to midline with LORT at 8 cm. CSF noted with 5 in pencan needle. Positive aspiration of CSF. Pt tolerated well

## 2022-09-12 NOTE — ED NOTES
Bed: ST03  Expected date:   Expected time:   Means of arrival:   Comments:  [facility] by air

## 2022-09-12 NOTE — H&P
[facility]    History and Physical  Hospitalist       Date of Admission:  9/12/2022    Assessment & Plan   Markie Rico is a 61 year old male with PMH significant for history of MI, HLD, morbid obesity, CAD, HTN, and OA of the bilateral hips who was emergently air lifted to the ED at [facility] on 9/12 after undergoing an elective total left hip replacement in [place], earlier in the day when he began to have chest pain post-operatively. Work-up in the ED revealed anterior STEMI. Cardiology was consulted. The patient was emergently taken to the Cath lab for PCI of the LAD and further evaluation and management.     STEMI  History of PCI to LAD and RCA; history of possible 3 stenting procedures at outside facility  Coronary Artery Disease Involving the Native Coronary Artery of Native Heart without Angina Pectoris  Ischemic Cardiomyopathy (LVEF 35%)  HTN  HLD  Direct admit after being found to have had a STEMI following left total hip surgery on 9/12. ECG suggestive of anterior STEMI with possible stent thrombosis. Emergently flown to [facility] ED and taken to the cath lab for intervention. Continues to have ongoing chest pain. [facility]  -[facility]/12: Cath lab; noted to have chronic total occlusion LAD that was ballooned; please refer to procedure note for further details  -Admit to IP CICU  -Resume ASA/Plavix  -Statin  -Resume PTA lisinopril and BB  -Bedrest per order  -Telemetry monitoring  -Echo ordered and pending  -PRN ECG ordered  -Cardiology consult; appreciate recommendations  -Morphine, SL nitroglycerin PRN for chest pain  -Cardiac diet  -Monitor lytes and replete as needed      POD #0 S/P Left MARIMAR  Post-op Blood Loss Anemia  Surgery for left MARIMAR in [place] today and immediately began to have chest pain in the PACU.  -Underwent Left MARIMAR on 9/12 with Dr. Briseno at [place]. Spinal anesthesia with EBL of 650 mL.   -Post-op anemia 2/2 blood loss: Hgb 9.5  from 12.8   -Received 1 unit PRBC  -Repeat Hgb pending; no obvious signs of bleeding   -Transfuse for Hgb 7 or less; or with s/s bleeding or hemodynamic instability  -Orthopedics consulted to assist with post-op management after left MARIMAR  -PRN pain meds ordered  -BM regimen  -Repeat Hgb pending      Clinically Significant Risk Factors Present on Admission             # Hypoalbuminemia: Albumin = 2.3 g/dL (Ref range: 3.4 - 5.0 g/dL) on admission, will monitor as appropriate   # Coagulation Defect: home medication list includes an anticoagulant medication  # Platelet Defect: home medication list includes an antiplatelet medication  # Hypertension: home medication list includes antihypertensive(s)            DVT Prophylaxis: Pneumatic Compression Devices  Code Status: Full Code    This patient was discussed with Dr. Bryan of the Hospitalist Service who agrees with current plans as outlined above.    Disposition: Anticipate at least 2 midnight stay for Cardiology consultation, Orthopedic consultation and post-op management s/p left MARIMAR.    Sahra Manriquez NP  [facility]  Securely message with the Vocera Web Console (learn more here)  Text page via [email or web address] Paging/Directory       Primary Care Physician   [facility]    Chief Complaint       History is obtained from the patient and review of the EMR.     History of Present Illness   Markie Rico is a 61 year old male with PMH significant for history of MI, HLD, morbid obesity, CAD, HTN, and OA of the bilateral hips who presented to the ED at [facility] today after undergoing an elective total left hip replacement in [place], earlier in the day. He began to have 9/10 chest pain post-procedure that radiated from his chest and into his arm, shoulder and back. Work-up revealed ST elevation in V2, V3 and Avf--suggestive of STEMI. He was given 3 SL nitroglycerin, full dose of ASA and 250 mcg of Fentanyl with flight paramedics. His pain was  not relieved. He had an estimated EBL of 650 mL post-op and did receive a unit of blood for a Hgb of 9.5. He was not given Brilinta or Heparin. He does have a history of VIRGINIE with the last placement that occurred in 2018. He recently stopped taking his Plavix for his surgical procedure. The patient was emergently flown to [facility] with plans for direct admission with planned intervention in the Cath Lab.    Patient was seen. Currently on bedrest with angio-seal in place. He continues to have chest pain. He is now having low back pain and some left hip pain. Chest pain never resolved, even after balloon intervention. He is endorsing that he is hungry and has not had any food or drink today due to having his hip surgery. He denies further complaints. Medication list, allergy list and code status reviewed. Denies further complaints.       PAST MEDICAL HISTORY  Past Medical History:   Diagnosis Date     Diverticulitis      Old myocardial infarction     s/p successful emergent revascualrization, PTCR/stent at the LAD for acute MI     Old myocardial infarction 06/07/11    Non-Q-wave MI-[facility]     Other and unspecified hyperlipidemia      Tobacco use disorder        PAST SURGICAL HISTORY  Past Surgical History:   Procedure Laterality Date     ARTHROPLASTY HIP Right 2/12/2018    Procedure: ARTHROPLASTY HIP;  right total hip arthroplasty;  Surgeon: Vineet Bedolla MD;  Location: [facility] OR     COLONOSCOPY N/A 5/20/2022    Procedure: COLONOSCOPY;  Surgeon: Mundo Velazquez MD;  Location:  GI     ZZC ANESTH,DX ARTHROSCOPIC PROC KNEE JOINT       ZZC COLOSTOMY       Z UNLISTED LAPAROSCOPY PROC,INTESTINE  7/25/2003    Exploratory Lap. Lysis of adhesions. Sigmoid colostomy takedown and stapled coloproctostomy.     [facility]      2 stents       HOME MEDICATIONS  Prior to Admission medications    Medication Sig Last Dose Taking? Auth Provider Long Term End Date   acetaminophen (TYLENOL) 325 MG tablet Take 2  tablets (650 mg) by mouth every 6 hours as needed for other (mild pain)   Vineet Briseno MD     acetaminophen (TYLENOL) 325 MG tablet Take 2 tablets (650 mg) by mouth every 4 hours as needed for other (multimodal surgical pain management along with NSAIDS and opioid medication as indicated based on pain control and physical function.)   Aide Dias PA-C     aspirin (ASA) 81 MG chewable tablet Take 81 mg by mouth daily   Reported, Patient     Cinnamon Bark POWD    Reported, Patient     clopidogrel (PLAVIX) 75 MG tablet Take 1 tablet by mouth daily   Reported, Patient Yes    clopidogrel (PLAVIX) 75 MG tablet Take 75 mg by mouth   Reported, Patient Yes 5/13/22   lisinopril (PRINIVIL,ZESTRIL) 10 MG tablet Take 1 tablet by mouth daily. Take 10 mg by mouth daily.   Alfred Abernathy MD     MAGNESIUM OXIDE PO Take 400 mg by mouth daily    Reported, Patient     metoprolol (TOPROL XL) 50 MG 24 hr tablet Take 1 tablet by mouth daily.  Patient taking differently: Take 25 mg by mouth 2 times daily   Alfred Abernathy MD     Multiple Vitamins-Minerals (CENTRUM MEN PO) Take 1 tablet by mouth daily   Unknown, Entered By History     nitroglycerin (NITROSTAT) 0.4 MG SL tablet Place 1 tablet under the tongue every 5 minutes as needed.   Alfred Abernathy MD     order for DME Equipment being ordered: Walker ()  Treatment Diagnosis: s/p joint replacement   Aide Dias PA-C     oxyCODONE (ROXICODONE) 5 MG tablet Take 1-2 tablets (5-10 mg) by mouth every 4 hours as needed for moderate to severe pain   Vineet Briseno MD     POTASSIUM CHLORIDE CR PO Take 10 mEq by mouth daily.   Reported, Patient     rivaroxaban ANTICOAGULANT (XARELTO) 10 MG TABS tablet Take 1 tablet (10 mg) by mouth daily   Vineet Briseno MD Yes    rosuvastatin (CRESTOR) 40 MG tablet Take 40 mg by mouth   Reported, Patient Yes 5/20/22   senna-docusate (SENOKOT-S/PERICOLACE) 8.6-50 MG tablet Take 1-2 tablets by mouth 2 times daily  Take while on oral narcotics to prevent or treat constipation.   Vineet Briseno MD     traMADol (ULTRAM) 50 MG tablet Take 100 mg by mouth   Reported, Patient No    TURMERIC PO    Reported, Patient         ALLERGIES  Allergies   Allergen Reactions     No Known Allergies      Orlistat      Other reaction(s): Incontinence of feces       SOCIAL HISTORY   reports that he quit smoking about 22 years ago. He has quit using smokeless tobacco. He reports previous alcohol use. He reports previous drug use.    FAMILY HISTORY  family history includes Diabetes in his sister; Hypertension in his mother; Lipids in his mother; Prostate Cancer in his father; Psychotic Disorder in his mother.    REVIEW OF SYSTEMS  A 10 point ROS was negative other than the symptoms noted above in the HPI.    Physical Exam   Nursing Notes Reviewed.  /85   Pulse 90   Temp 98.3  F (36.8  C) (Oral)   Resp 16   SpO2 100%    General:  Appears stated age in no acute distress.  Skin:  Warm, dry. No rashes or lesions on exposed skin.  HEENT:  Normocephalic, atraumatic; EOMs grossly intact.  Neck:  Supple.  Chest:  Breath sounds CTA and no increased work of breathing.  Cardiovascular:  RRR, no rub or murmur. Mild 1+ BLE, pulses 2+ throughout. Angio seal to right groin.   Abdomen:  Soft, obese and non-tender, non-distended.  Musculoskeletal:  Moves all four extremities. Left hip dressing is clean, dry and intact. Foam wedge in place.   Neurological:  CN 2-12 grossly intact.    Data   Data reviewed today:  I personally reviewed   Recent Labs   Lab 09/12/22  1244   WBC 21.1*   HGB 9.5*   MCV 92         POTASSIUM 3.9   CHLORIDE 109   CO2 19*   BUN 15   CR 0.47*   ANIONGAP 10   ASHLEY 7.6*   *   ALBUMIN 2.3*   PROTTOTAL 4.5*   BILITOTAL 0.2   ALKPHOS 55   ALT 19   AST 15       Imaging:  Recent Results (from the past 24 hour(s))   CT Chest Pulmonary Embolism w Contrast    Narrative    CT CHEST PULMONARY EMBOLISM WITH CONTRAST  9/12/2022 1:44 PM    CLINICAL HISTORY: Postoperative chest pain  TECHNIQUE: CT angiogram chest during arterial phase injection IV  contrast. 2D and 3D MIP reconstructions were performed by the CT  technologist. Dose reduction techniques were used.     CONTRAST: 100 ml [id number]    COMPARISON: None.    FINDINGS:  ANGIOGRAM CHEST: Pulmonary arteries are normal caliber and negative  for pulmonary emboli. Thoracic aorta is negative for dissection. No CT  evidence of right heart strain.    LUNGS AND PLEURA: Groundglass densities are noted in the bilateral  lungs which could represent mild vascular congestion. No pneumothorax,  infiltrate or effusion is identified. No significant nodule is seen.    MEDIASTINUM/AXILLAE: Heart is upper normal size. There are extensive  coronary artery calcifications and/or stents. There are calcifications  around the aortic valve annulus. There is ectasia/mild aneurysmal  dilatation of the aortic root at 4.4 cm in diameter. Ascending  thoracic aorta measures up to 3.9 cm in diameter and is mildly  ectatic. No mediastinal, hilar, or axillary lymphadenopathy is  identified. Visualized portions of the thyroid are unremarkable.    UPPER ABDOMEN: A few calcifications are seen in the inferior right  lobe of the liver likely represent chronic granulomatous disease or  prior injury. Tiny nonobstructive intrarenal calculi measure less than  0.3 cm bilaterally. Visualized portions of the upper abdominal  contents are otherwise grossly unremarkable. Evaluation of the solid  organs of the upper abdomen is limited due to lack of IV contrast.    MUSCULOSKELETAL: There are severe degenerative changes of the  bilateral shoulders. Degenerative changes of the spine are also noted.  No aggressive osseous lesions or acute osseous fractures are  identified. Ossified intra-articular free bodies are seen in the  bilateral shoulders, worse in the left shoulder.      Impression    IMPRESSION:  1.  No evidence for  pulmonary artery embolism is identified.  2.  Mild groundglass densities in the lungs could represent vascular  congestion.  3.  Extensive coronary artery calcifications and/or stents. This could  be associated with the patient's recent symptoms.  4.  Severe degenerative changes of the bilateral shoulders.  Degenerative changes of the spine are also noted.    TONY SALVADOR MD         SYSTEM ID:  [id number]   XR Pelvis Port 1/2 Views    Narrative    PELVIS PORTABLE ONE VIEW   9/12/2022 1:46 PM     HISTORY: Status post hip surgery    COMPARISON: Pelvis and left hip x-rays dated 8/4/2022.      Impression    IMPRESSION:  1. Single view of the lower pelvis was provided demonstrating new left  hip arthroplasty. Hardware components are well seated without evidence  for hardware failure or periprosthetic fracture. Gas is seen in the  soft tissues around left hip, consistent with recent operative status.    2. Right hip arthroplasty components are again noted and demonstrate  no evidence for hardware failure or periprosthetic fracture.  3. Only a single view was obtained due to patient issues at the time  of the exam.    TONY SALVADOR MD         SYSTEM ID:  [id number]

## 2022-09-12 NOTE — ED TRIAGE NOTES
Triage Assessment     Row Name 09/12/22 1459       Triage Assessment (Adult)    Airway WDL WDL       Respiratory WDL    Respiratory WDL WDL       Skin Circulation/Temperature WDL    Skin Circulation/Temperature WDL WDL       Cardiac WDL    Cardiac WDL X       Peripheral/Neurovascular WDL    Peripheral Neurovascular WDL WDL       Cognitive/Neuro/Behavioral WDL    Cognitive/Neuro/Behavioral WDL WDL

## 2022-09-12 NOTE — ED TRIAGE NOTES
Triage Assessment     Row Name 09/12/22 1453       Triage Assessment (Adult)    Airway WDL WDL       Respiratory WDL    Respiratory WDL WDL       Skin Circulation/Temperature WDL    Skin Circulation/Temperature WDL WDL       Cardiac WDL    Cardiac WDL X       Peripheral/Neurovascular WDL    Peripheral Neurovascular WDL WDL       Cognitive/Neuro/Behavioral WDL    Cognitive/Neuro/Behavioral WDL WDL

## 2022-09-12 NOTE — Clinical Note
The first balloon was inserted into the left anterior descending.Max pressure = 11 zulema. Total duration = 45 seconds.     Max pressure = 12 zulema. Total duration = 40 seconds.    Balloon reinflated a second time: Max pressure = 12 zulema. Total duration = 40 seconds.  Balloon reinflated a third time: Max pressure = 12 zulema. Total duration = 45 seconds.  Balloon reinflated a fourth time: Max pressure = 12 zulema. Total duration = 45 seconds.  Balloon reinflated a fourth time: Max pressure = 12 zulema. Total duration = 45 seconds.

## 2022-09-12 NOTE — ANESTHESIA POSTPROCEDURE EVALUATION
Patient: Markie Rico    Procedure: Procedure(s):  LEFT TOTAL HIP ARTHROPLASTY       Anesthesia Type:  Spinal    Note:  Disposition: Inpatient   Postop Pain Control:    PONV: No   Neuro/Psych: Uneventful            Sign Out: Acceptable/Baseline neuro status   Airway/Respiratory: Uneventful            Sign Out: Acceptable/Baseline resp. status   CV/Hemodynamics: Uneventful            Sign Out: Acceptable CV status; No obvious hypovolemia; No obvious fluid overload   Other NRE:    DID A NON-ROUTINE EVENT OCCUR? YES    Event details/Postop Comments:  Called to PACU for patient complaining of chest pain, VS WNL  Stat EKG ordered, Stat labs ordered, patient on FM oxygen, given fentanyl for pain, 325 mg chewable po asa given.  SL nitroglycerine  ordered and given x3  Rapid response called.  Patient transferred to the ED.            Last vitals:  Vitals Value Taken Time   /69 09/12/22 1407   Temp 98.2  F (36.8  C) 09/12/22 1348   Pulse 79 09/12/22 1407   Resp 18 09/12/22 1407   SpO2 100 % 09/12/22 1407       Electronically Signed By: ANY Geiger CRNA  September 12, 2022  2:08 PM

## 2022-09-13 ENCOUNTER — APPOINTMENT (OUTPATIENT)
Dept: PHYSICAL THERAPY | Facility: CLINIC | Age: 62
DRG: 250 | End: 2022-09-13
Attending: INTERNAL MEDICINE
Payer: COMMERCIAL

## 2022-09-13 ENCOUNTER — APPOINTMENT (OUTPATIENT)
Dept: CARDIOLOGY | Facility: CLINIC | Age: 62
DRG: 250 | End: 2022-09-13
Attending: INTERNAL MEDICINE
Payer: COMMERCIAL

## 2022-09-13 LAB
ALBUMIN SERPL-MCNC: 2.8 G/DL (ref 3.4–5)
ALP SERPL-CCNC: 55 U/L (ref 40–150)
ALT SERPL W P-5'-P-CCNC: 119 U/L (ref 0–70)
ANION GAP SERPL CALCULATED.3IONS-SCNC: 6 MMOL/L (ref 3–14)
ANION GAP SERPL CALCULATED.3IONS-SCNC: 7 MMOL/L (ref 3–14)
AST SERPL W P-5'-P-CCNC: 593 U/L (ref 0–45)
BASOPHILS # BLD AUTO: 0 10E3/UL (ref 0–0.2)
BASOPHILS NFR BLD AUTO: 0 %
BILIRUB SERPL-MCNC: 0.8 MG/DL (ref 0.2–1.3)
BUN SERPL-MCNC: 22 MG/DL (ref 7–30)
BUN SERPL-MCNC: 22 MG/DL (ref 7–30)
CALCIUM SERPL-MCNC: 8.6 MG/DL (ref 8.5–10.1)
CALCIUM SERPL-MCNC: 8.7 MG/DL (ref 8.5–10.1)
CHLORIDE BLD-SCNC: 106 MMOL/L (ref 94–109)
CHLORIDE BLD-SCNC: 107 MMOL/L (ref 94–109)
CO2 SERPL-SCNC: 23 MMOL/L (ref 20–32)
CO2 SERPL-SCNC: 23 MMOL/L (ref 20–32)
CREAT SERPL-MCNC: 0.79 MG/DL (ref 0.66–1.25)
CREAT SERPL-MCNC: 0.82 MG/DL (ref 0.66–1.25)
EOSINOPHIL # BLD AUTO: 0 10E3/UL (ref 0–0.7)
EOSINOPHIL NFR BLD AUTO: 0 %
ERYTHROCYTE [DISTWIDTH] IN BLOOD BY AUTOMATED COUNT: 13 % (ref 10–15)
GFR SERPL CREATININE-BSD FRML MDRD: >90 ML/MIN/1.73M2
GFR SERPL CREATININE-BSD FRML MDRD: >90 ML/MIN/1.73M2
GLUCOSE BLD-MCNC: 156 MG/DL (ref 70–99)
GLUCOSE BLD-MCNC: 160 MG/DL (ref 70–99)
HCT VFR BLD AUTO: 27.4 % (ref 40–53)
HGB BLD-MCNC: 9 G/DL (ref 13.3–17.7)
IMM GRANULOCYTES # BLD: 0.1 10E3/UL
IMM GRANULOCYTES NFR BLD: 1 %
LVEF ECHO: NORMAL
LYMPHOCYTES # BLD AUTO: 1.5 10E3/UL (ref 0.8–5.3)
LYMPHOCYTES NFR BLD AUTO: 11 %
MCH RBC QN AUTO: 29.1 PG (ref 26.5–33)
MCHC RBC AUTO-ENTMCNC: 32.8 G/DL (ref 31.5–36.5)
MCV RBC AUTO: 89 FL (ref 78–100)
MONOCYTES # BLD AUTO: 1.3 10E3/UL (ref 0–1.3)
MONOCYTES NFR BLD AUTO: 9 %
NEUTROPHILS # BLD AUTO: 11.1 10E3/UL (ref 1.6–8.3)
NEUTROPHILS NFR BLD AUTO: 79 %
NRBC # BLD AUTO: 0 10E3/UL
NRBC BLD AUTO-RTO: 0 /100
PLATELET # BLD AUTO: 174 10E3/UL (ref 150–450)
POTASSIUM BLD-SCNC: 4.5 MMOL/L (ref 3.4–5.3)
POTASSIUM BLD-SCNC: 4.5 MMOL/L (ref 3.4–5.3)
PROT SERPL-MCNC: 5.7 G/DL (ref 6.8–8.8)
RBC # BLD AUTO: 3.09 10E6/UL (ref 4.4–5.9)
SODIUM SERPL-SCNC: 136 MMOL/L (ref 133–144)
SODIUM SERPL-SCNC: 136 MMOL/L (ref 133–144)
TROPONIN I SERPL HS-MCNC: ABNORMAL NG/L
WBC # BLD AUTO: 13.9 10E3/UL (ref 4–11)

## 2022-09-13 PROCEDURE — 84484 ASSAY OF TROPONIN QUANT: CPT | Performed by: INTERNAL MEDICINE

## 2022-09-13 PROCEDURE — 99233 SBSQ HOSP IP/OBS HIGH 50: CPT | Performed by: INTERNAL MEDICINE

## 2022-09-13 PROCEDURE — 82310 ASSAY OF CALCIUM: CPT | Performed by: INTERNAL MEDICINE

## 2022-09-13 PROCEDURE — 250N000013 HC RX MED GY IP 250 OP 250 PS 637: Performed by: NURSE PRACTITIONER

## 2022-09-13 PROCEDURE — 210N000002 HC R&B HEART CARE

## 2022-09-13 PROCEDURE — 93005 ELECTROCARDIOGRAM TRACING: CPT

## 2022-09-13 PROCEDURE — 82040 ASSAY OF SERUM ALBUMIN: CPT | Performed by: INTERNAL MEDICINE

## 2022-09-13 PROCEDURE — 250N000011 HC RX IP 250 OP 636: Performed by: NURSE PRACTITIONER

## 2022-09-13 PROCEDURE — 97110 THERAPEUTIC EXERCISES: CPT | Mod: GP

## 2022-09-13 PROCEDURE — 36415 COLL VENOUS BLD VENIPUNCTURE: CPT | Performed by: NURSE PRACTITIONER

## 2022-09-13 PROCEDURE — 85025 COMPLETE CBC W/AUTO DIFF WBC: CPT | Performed by: NURSE PRACTITIONER

## 2022-09-13 PROCEDURE — 93010 ELECTROCARDIOGRAM REPORT: CPT | Performed by: INTERNAL MEDICINE

## 2022-09-13 PROCEDURE — 99232 SBSQ HOSP IP/OBS MODERATE 35: CPT | Performed by: INTERNAL MEDICINE

## 2022-09-13 PROCEDURE — 255N000002 HC RX 255 OP 636: Performed by: INTERNAL MEDICINE

## 2022-09-13 PROCEDURE — 999N000208 ECHOCARDIOGRAM COMPLETE

## 2022-09-13 PROCEDURE — 97530 THERAPEUTIC ACTIVITIES: CPT | Mod: GP

## 2022-09-13 PROCEDURE — 97162 PT EVAL MOD COMPLEX 30 MIN: CPT | Mod: GP

## 2022-09-13 PROCEDURE — 93306 TTE W/DOPPLER COMPLETE: CPT | Mod: 26 | Performed by: INTERNAL MEDICINE

## 2022-09-13 PROCEDURE — 250N000013 HC RX MED GY IP 250 OP 250 PS 637: Performed by: INTERNAL MEDICINE

## 2022-09-13 RX ORDER — POLYETHYLENE GLYCOL 3350 17 G/17G
17 POWDER, FOR SOLUTION ORAL ONCE
Status: COMPLETED | OUTPATIENT
Start: 2022-09-13 | End: 2022-09-14

## 2022-09-13 RX ADMIN — ATORVASTATIN CALCIUM 80 MG: 80 TABLET, FILM COATED ORAL at 08:37

## 2022-09-13 RX ADMIN — SENNOSIDES AND DOCUSATE SODIUM 1 TABLET: 50; 8.6 TABLET ORAL at 20:45

## 2022-09-13 RX ADMIN — METOPROLOL SUCCINATE 12.5 MG: 25 TABLET, EXTENDED RELEASE ORAL at 10:36

## 2022-09-13 RX ADMIN — MORPHINE SULFATE 2 MG: 2 INJECTION, SOLUTION INTRAMUSCULAR; INTRAVENOUS at 06:56

## 2022-09-13 RX ADMIN — CLOPIDOGREL BISULFATE 75 MG: 75 TABLET ORAL at 08:37

## 2022-09-13 RX ADMIN — OXYCODONE HYDROCHLORIDE 5 MG: 5 TABLET ORAL at 14:39

## 2022-09-13 RX ADMIN — MORPHINE SULFATE 2 MG: 2 INJECTION, SOLUTION INTRAMUSCULAR; INTRAVENOUS at 20:44

## 2022-09-13 RX ADMIN — HUMAN ALBUMIN MICROSPHERES AND PERFLUTREN 9 ML: 10; .22 INJECTION, SOLUTION INTRAVENOUS at 08:10

## 2022-09-13 RX ADMIN — OXYCODONE HYDROCHLORIDE 5 MG: 5 TABLET ORAL at 19:39

## 2022-09-13 RX ADMIN — ASPIRIN 81 MG: 81 TABLET, COATED ORAL at 08:37

## 2022-09-13 RX ADMIN — MORPHINE SULFATE 2 MG: 2 INJECTION, SOLUTION INTRAMUSCULAR; INTRAVENOUS at 17:33

## 2022-09-13 RX ADMIN — MORPHINE SULFATE 2 MG: 2 INJECTION, SOLUTION INTRAMUSCULAR; INTRAVENOUS at 10:38

## 2022-09-13 RX ADMIN — OXYCODONE HYDROCHLORIDE 5 MG: 5 TABLET ORAL at 08:37

## 2022-09-13 RX ADMIN — OXYCODONE HYDROCHLORIDE 10 MG: 5 TABLET ORAL at 23:03

## 2022-09-13 RX ADMIN — PANTOPRAZOLE SODIUM 40 MG: 40 TABLET, DELAYED RELEASE ORAL at 06:40

## 2022-09-13 ASSESSMENT — ACTIVITIES OF DAILY LIVING (ADL)
ADLS_ACUITY_SCORE: 40
ADLS_ACUITY_SCORE: 39
ADLS_ACUITY_SCORE: 31
ADLS_ACUITY_SCORE: 40
ADLS_ACUITY_SCORE: 39
ADLS_ACUITY_SCORE: 40
ADLS_ACUITY_SCORE: 40
ADLS_ACUITY_SCORE: 39
ADLS_ACUITY_SCORE: 40
ADLS_ACUITY_SCORE: 45
ADLS_ACUITY_SCORE: 39
ADLS_ACUITY_SCORE: 39

## 2022-09-13 NOTE — PHARMACY-ADMISSION MEDICATION HISTORY
Pharmacy Medication History  Admission medication history interview status for the 9/12/2022  admission is complete. See EPIC admission navigator for prior to admission medications     Location of Interview: Patient room  Medication history sources: Patient, Care Everywhere and recent discharge from [facility]    Significant changes made to the medication list:  Added Ozempic, bupropion, rosuvastatin  Modified lisinopril, metoprolol    In the past week, patient estimated taking medication this percent of the time: greater than 90%    Additional medication history information:   New medications (Tylenol, rivaroxaban, senna) were added to his list on discharge from [facility].  Pt has not started these yet.      Medication reconciliation completed by provider prior to medication history? Yes    Time spent in this activity: 30 min    Prior to Admission medications    Medication Sig Last Dose Taking? Auth Provider Long Term End Date   acetaminophen (TYLENOL) 325 MG tablet Take 2 tablets (650 mg) by mouth every 6 hours as needed for other (mild pain) 9/12/2022 at 0500 Yes Vineet Briseno MD     aspirin (ASA) 81 MG chewable tablet Take 81 mg by mouth daily 9/12/2022 at 324mg Yes Reported, Patient     buPROPion (WELLBUTRIN SR) 150 MG 12 hr tablet Take 150 mg by mouth daily 9/11/2022 at am Yes Unknown, Entered By History     Cinnamon Bark POWD  Past Week Yes Reported, Patient     clopidogrel (PLAVIX) 75 MG tablet Take 1 tablet by mouth daily 9/3/2022 Yes Reported, Patient Yes    lisinopril (ZESTRIL) 10 MG tablet Take 5 mg by mouth daily 9/10/2022 at pm Yes Unknown, Entered By History     MAGNESIUM OXIDE PO Take 500 mg by mouth daily Past Week at Unknown time Yes Reported, Patient     metoprolol succinate ER (TOPROL XL) 25 MG 24 hr tablet Take 12.5 mg by mouth daily Past Week at Unknown time Yes Unknown, Entered By History     Multiple Vitamins-Minerals (CENTRUM MEN PO) Take 1 tablet by mouth daily Past Week at Unknown  time Yes Unknown, Entered By History     nitroglycerin (NITROSTAT) 0.4 MG SL tablet Place 1 tablet under the tongue every 5 minutes as needed.  at prn Yes Alfred Abernathy MD     POTASSIUM CHLORIDE CR PO Take 10 mEq by mouth daily. Past Week at Unknown time Yes Reported, Patient     rosuvastatin (CRESTOR) 40 MG tablet Take 40 mg by mouth daily 9/11/2022 at am Yes Unknown, Entered By History No    SEMAGLUTIDE, 1 MG/DOSE, SC Inject 1 mg Subcutaneous once a week Tuesdays 9/6/2022 Yes Unknown, Entered By History     TURMERIC PO  Past Week at Unknown time Yes Reported, Patient     order for DME Equipment being ordered: Walker ()  Treatment Diagnosis: s/p joint replacement   Aide Dias PA-C     oxyCODONE (ROXICODONE) 5 MG tablet Take 1-2 tablets (5-10 mg) by mouth every 4 hours as needed for moderate to severe pain  at not started  Vineet Briseno MD     rivaroxaban ANTICOAGULANT (XARELTO) 10 MG TABS tablet Take 1 tablet (10 mg) by mouth daily  at not started  Vineet Briseno MD Yes    senna-docusate (SENOKOT-S/PERICOLACE) 8.6-50 MG tablet Take 1-2 tablets by mouth 2 times daily Take while on oral narcotics to prevent or treat constipation.  at not started  Vineet Briseno MD     traMADol (ULTRAM) 50 MG tablet Take 100 mg by mouth 3 times daily as needed   Reported, Patient No        The information provided in this note is only as accurate as the sources available at the time of update(s)

## 2022-09-13 NOTE — PROGRESS NOTES
ORTHO DAILY PROGRESS NOTE    POD # 1    Markie Rico is a 61 year old male  s/p Procedure(s):  right total hip arthroplasty at [facility]       Interim: He had chest pain, and had an MI, transferred to [facility]  Coronary Angiogram  Percutaneous Coronary Intervention -  on 2022  Had 1 unit packed red blood cells       SUBJECTIVE:  Complaints: still some mid back pain, but chest pain gone.  Left hip very painful.  Nausea: none    OBJECTIVE:  BP (!) 84/65 (BP Location: Left arm)   Pulse (!) 124   Temp 97.9  F (36.6  C) (Oral)   Resp 18   Wt 123.4 kg (272 lb)   SpO2 96%   BMI 39.03 kg/m      Temp (24hrs), Av.6  F (36.4  C), Min:97  F (36.1  C), Max:99.1  F (37.3  C)    General: in no apparent distress,  Awake, alert    Incision is covered. Significant swelling.  CMSI   Toes warm and well perfused  Calves soft and nontender to squeeze.      Recent Labs   Lab Test 22  0553 22  1929 22  1244 18  1444   HGB 9.0* 11.8* 9.5* 13.4     --  193 262     Recent Labs   Lab Test 22  0553 22  1244 18  1444   POTASSIUM 4.5 3.9 3.7   BUN 22 15 10   CR 0.79 0.47* 0.43*     Recent Labs   Lab Test 18  1444   INR 0.97         PT:  Pending. Was light headed last night standing.           ASSESSMENT:  Status post left darcy complicated by postoperative MI.  Stable under the care of the cardiology team.    PLAN:  He is being anticoagulated. DVT prophylaxis was planned but full anticoagulation with heparin will likely cause significant bleeding in the hip at this point, but can't be avoided.  Hgb should continue to be monitored.,  PHYSICAL THERAPY when able.   Up to a chair at least today if able.  Pain meds   Discharge planned: per tory Briseno MD  Orthopedic Surgery   [facility]  M: [phone]

## 2022-09-13 NOTE — SUMMARY OF CARE
Patient's vitals are stable. Patient complaint of chest pain. Morphine PRN given. Patient otherwise is very cooperative and good mood.

## 2022-09-13 NOTE — PROGRESS NOTES
[facility]    Medicine Progress Note- Hospitalist Service    Date of Admission: 9/12/2022  2:53 PM     Today's Date: 9/13/2022       Assessment and Plan:    61 year old male with PMH significant for history of MI, HLD, morbid obesity, CAD, HTN, and OA of the bilateral hips who was emergently air lifted to the ED at [facility] on 9/12 after he underwent an elective total left hip replacement in [place] on 9/12/22 and in postoperative unit he reported chest pain, MI.  Work-up in the ED revealed anterior STEMI. Cardiology was consulted. The patient was emergently taken to the Cath lab for PCI of the LAD and further evaluation and management.   Patient is a direct admit        STEMI postoperatively after right hip total arthroplasty at [facility]  History of PCI to LAD and RCA; history of possible 3 stenting procedures at outside facility  Coronary Artery Disease Involving the Native Coronary Artery of Native Heart without Angina Pectoris  Ischemic Cardiomyopathy (LVEF 35%)  HTN  HLD  Direct admit after being found to have had a STEMI following left total hip surgery on 9/12.   ECG suggestive of anterior STEMI with possible stent thrombosis.   Emergently flown to [facility] ED and taken to the cath lab for intervention.   -9/12: Emergently patient was taken to Cath Lab - noted to have chronic total occlusion LAD that was ballooned  Echocardiogram pending  Continue aspirin, Plavix, lisinopril, metoprolol, atorvastatin  Cardiology consult appreciated  Telemetry monitor  Cardiac rehab  Elevated AST likely from MI monitor trend   Blood pressure is soft        S/P Left MARIMAR at [facility]  on 9/12/2022  Post-op Blood Loss Anemia  Surgery for left MARIMAR in [place] today and immediately began to have chest pain in the PACU.  -Underwent Left MARIMAR on 9/12 with Dr. Briseno at [place]. Spinal anesthesia with EBL of 650 mL.   Got blood transfusion earlier  Transfuse blood if hemoglobin 7-no  signs of bleeding  Orthopedic consult appreciated  Continue pain medication as needed oxycodone with senna, Tylenol  Postop cares per surgery  Postoperative anticoagulation especially in setting of dual antiplatelet therapy to be be finalized between cardiology and orthopedic team      No bowel movement since surgery.  Passing flatus.  Continue bowel regimen.  If does not improve then get imaging          Hypertension continue home medication        Postop elevated leukocytosis likely reactive  Monitor closely        Elevated AST   likely from MI   monitor trend             DVT Prophylaxis: Pneumatic Compression Devices      Full code      Disposition  Once cleared from cardiology, orthopedic        Luis Miguel Jose MD  [facility]        Subjective/Interval History:  No new chest pain No worsening sob. No abd pain  No new c/o  No acute events overnight.      Physical Exam:    Temp:  [97  F (36.1  C)-99.1  F (37.3  C)] 97.8  F (36.6  C)  Pulse:  [] 124  Resp:  [0-29] 12  BP: ()/() 108/77  FiO2 (%):  [2 %] 2 %  SpO2:  [93 %-100 %] 100 %       Intake/Output Summary (Last 24 hours) at 9/13/2022 0913  Last data filed at 9/13/2022 0400  Gross per 24 hour   Intake 300 ml   Output 750 ml   Net -450 ml        General: Lying comfortably, not in distress  HEENT: no icterus, no obvious masses, normal conjunctiva, MM moist  No JVD  CVS: RRR, no gallops/murmur  Pulm: AEBE clear  Abd: Soft, nontender, not distended  Psych- normal mood  Neuro: Alert oriented times 3, moving all 4 limbs.  Ext: no edema in limbs      Data:    Labs and imaging reviewed on [facility]  All laboratory and imaging data in the past 24 hours reviewed        Discussed plan with RN      Total time spent more than 35 min including CC    Luis Miguel Jose MD

## 2022-09-13 NOTE — CONSULTS
NUTRITION EDUCATION    REASON FOR ASSESSMENT:  Nutrition education on American Heart Association (AHA) Heart Healthy Diet.    NUTRITION HISTORY:  Information obtained from patient.   - States he's followed the heart healthy diet in the past, not actively following currently. Feels he does eat generally healhty. Started a low carb diet with RD monitoring and support, and has lost 60#. Added fruit back in recently.   - [facility]     CURRENT DIET ORDER:  Low sat fat, Na <2400 mg     NUTRITION DIAGNOSIS:  None identified     INTERVENTIONS:  Nutrition Prescription:    Recommended AHA Heart Healthy Diet    Implementation:     Nutrition Education (Content):  a) reviewed Heart Healthy Diet guidelines  b) provided heart healthy diet handout    Nutrition Education (Application):  a) Discussed current eating habits and recommended alternative food choices    Anticipate good compliance    Diet Education - refer to Education flowsheet    Goals:    Patient verbalizes understanding of diet     All of the above goals met during education session    Follow Up/Monitoring:    Provided RD contact information for future questions    Natali Bowser RD, LD  [facility], [facility], Ortho, [facility]o Spine  Pager: [phone]  Weekend Pager: [phone]

## 2022-09-13 NOTE — PROGRESS NOTES
2000:  Assessed patient, patient reporting pain to lower and upper back. Pain level of 10. Patient requests pain medication.  GCS 15, no nausea, no vomiting reported. No chest pain reported by patient.  Normal sinus rhythm on cardiac monitor, vital signs stable.  Family at bedside.      2120:  After patient repositioning patient reports dizziness and nausea, Zofran administered.      2343: Notified MD about patient's sinus tachycardia in the 130s and drop in blood pressure, systolic in 88-90s. New orders given to administer  mL bolus and STAT EKG.    0017:  Notified MD of EKG results.  No new orders given at this time    0655:  Patient reports severe pain level of 8 to left hip and lower back, morphine administered.

## 2022-09-13 NOTE — PROGRESS NOTES
09/13/22 1220   Quick Adds   Type of Visit Initial PT Evaluation   Living Environment   People in Home spouse   Number of Stairs, Within Home, Primary none   Living Environment Comments Pt lives in single story home, 4 ASIM no railings, then all needs on main level   Self-Care   Equipment Currently Used at Home crutches;walker, standard   Fall history within last six months yes   Number of times patient has fallen within last six months 1   Activity/Exercise/Self-Care Comment Pt recently utilizing crutches for mobility 2/2 L hip pain. Had R hip replacement ~ 5yrs ago without complications   General Information   Onset of Illness/Injury or Date of Surgery 09/12/22   Referring Physician Vineet Briseno MD   Patient/Family Therapy Goals Statement (PT) To get stronger and go home   Pertinent History of Current Problem (include personal factors and/or comorbidities that impact the POC) POD # 1 L MAIRMAR at [place], post op anterior STEMI, airlifted to [facility], emergent balloon angioplasty PCI to LAD. Per post op ortho note pt WBAT LLE with posterior precautions   Existing Precautions/Restrictions fall;no hip ADD past midline;90 degree hip flexion;no pivoting or twisting;no hip IR   Weight-Bearing Status - LLE weight-bearing as tolerated   Cognition   Affect/Mental Status (Cognition) WNL   Pain Assessment   Patient Currently in Pain   (Hip pain well managed)   Integumentary/Edema   Integumentary/Edema Comments L hip dressing clean and dry   Posture    Posture Forward head position   Range of Motion (ROM)   ROM Comment LLE hip flexion/extension WFL   Strength (Manual Muscle Testing)   Strength Comments Gross functionla weakness, difficulty tolerating OOB mobility due to light headedness. LLE weakness post op   Bed Mobility   Comment, (Bed Mobility) Max A x 2 within precautions   Transfers   Comment, (Transfers) NA   Gait/Stairs (Locomotion)   Comment, (Gait/Stairs) NA   Balance   Balance Comments Unable to accurately  assess   Sensory Examination   Sensory Perception Comments intact to light touch   Clinical Impression   Criteria for Skilled Therapeutic Intervention Yes, treatment indicated   PT Diagnosis (PT) impaired IND with functional mobility from baseline   Influenced by the following impairments impaired functional strength, impaired activity tolerance, impaired balance   Functional limitations due to impairments impaired IND with mobility   Clinical Presentation (PT Evaluation Complexity) Evolving/Changing   Clinical Presentation Rationale multiple medical issues, PMH, clinical judgement, level of assist   Clinical Decision Making (Complexity) moderate complexity   Planned Therapy Interventions (PT) balance training;bed mobility training;cryotherapy;gait training;home exercise program;patient/family education;stair training;transfer training;strengthening;ROM (range of motion);progressive activity/exercise;risk factor education   Risk & Benefits of therapy have been explained evaluation/treatment results reviewed;risks/benefits reviewed;care plan/treatment goals reviewed;patient   PT Discharge Planning   PT Discharge Recommendation (DC Rec) [facility]-Motivated patient will benefit from intensive, interdisciplinary therapy.  Anticipate will be able to tolerate 3 hours of therapy per day   PT Rationale for DC Rec Limited tolerance for OOB mobility this AM. Pt currently requiring A x 2 for mobility, status post hip replacement, STEMI, PCI to LAD. Recommend DC to ARU for therapies to improve funcitonal IND and activity tolerance as pt far below baseline at this time   Plan of Care Review   Plan of Care Reviewed With patient   Total Evaluation Time   Total Evaluation Time (Minutes) 15   Physical Therapy Goals   PT Frequency Daily   PT Predicted Duration/Target Date for Goal Attainment 09/20/22   PT Goals Bed Mobility;Transfers;Gait;Stairs   PT: Bed Mobility Supervision/stand-by assist;Supine to/from sit;Within  precautions   PT: Transfers Supervision/stand-by assist;Sit to/from stand;Bed to/from chair;Assistive device;Within precautions   PT: Gait Supervision/stand-by assist;100 feet;Rolling walker;Within precautions   PT: Stairs Moderate assist;Within precautions;4 stairs;Assistive device

## 2022-09-13 NOTE — PROGRESS NOTES
[facility]    Cardiology Consultation       Assessment & Plan     1.  ACS with ST segment changes anteriorly post left hip surgery today, patient underwent emergent balloon angioplasty with restoration of VINITA-3 flow in LAD.  Disease noted elsewhere however treated medically at this time.  Please see Cath Lab report for full details  2.  History of PCI to LAD and RCA remotely.  Prior angiogram is from 2018 where additional PCI to RCA was performed at outside hospital  3.  Status post left hip surgery 9/12/2022  4.  Anemia for which he is received 1 unit of transfused packed red blood cells  5.  CT which demonstrates no evidence of pulmonary embolism or thoracic dissection.  6.  Hypertension  7.  Hyperlipidemia  8.  Elevated BMI  9.  Ischemic cardiomyopathy with an ejection fraction of 35% by outside evaluation.        Recommendations    1.  ACS with recent left hip surgery.  EKG suggests anterior STEMI possible stent thrombosis.  Subacute lesion with 100% occlusion of the LAD.  Underwent complex balloon angioplasty only of the LAD with restoration of VINITA-3 flow.  Disease noted elsewhere which is being treated medically.  Currently on dual antiplatelet therapy.  No return of symptoms at present.  We will have a follow-up troponin ordered for today.    2.  EKG demonstrates sinus tachycardia likely related to his current pain from his left hip.  Continue with analgesics.    3.  Ischemic cardiomyopathy echocardiogram has been performed and formal report is still pending.  Continue with his ACE inhibitor, beta-blocker, aspirin, beta-blocker and statin therapy.  Aldactone may be beneficial however his hemodynamics do not allow, will focus on current medical therapy at present.    4.  Orthopedic surgery was also involved in his care.  Deferred to hospitalist and orthopedic team regarding his rehab which needs to continue during his current hospital stay.  Please note we can work with  orthopedic surgery if they feel anticoagulation with Eliquis or Xarelto would be appropriate.  We can certainly adjust our antiplatelet regimen to accommodate this request.  Await response from orthopedic surgery.    5.  Cardiology will follow along.  Thank you kindly for allowing us participate in his care.    6.  [facility] [place]  Upon discharge can follow-up with [facility].    Too Spain MD, MD          HPI:    Events overnight reviewed.  Patient underwent emergent PCI with balloon angioplasty only.  Please note no additional stents were placed.  VINITA-3 flow was noted and symptoms have since resolved.  Echocardiogram was performed this morning.  EKG is stable and does not demonstrate an ischemic pattern.        Primary Care Physician   [facility]      Patient Active Problem List   Diagnosis     History of acute myocardial infarction of inferior wall     Hyperlipidemia LDL goal <100     Morbid obesity (H)     Multiple joint pain     CAD (coronary artery disease)     Medication side effect     Benign essential hypertension     Primary osteoarthritis of both hips     Status post total replacement of right hip     Primary osteoarthritis of left hip       Past Medical History   I have reviewed this patient's medical history and updated it with pertinent information if needed.   Past Medical History:   Diagnosis Date     Diverticulitis      Old myocardial infarction     s/p successful emergent revascualrization, PTCR/stent at the LAD for acute MI     Old myocardial infarction 06/07/11    Non-Q-wave MI-[place]     Other and unspecified hyperlipidemia      Tobacco use disorder        Past Surgical History   I have reviewed this patient's surgical history and updated it with pertinent information if needed.  Past Surgical History:   Procedure Laterality Date     ARTHROPLASTY HIP Right 2/12/2018    Procedure: ARTHROPLASTY HIP;  right total hip  arthroplasty;  Surgeon: Vineet Bedolla MD;  Location: [facility] OR     COLONOSCOPY N/A 5/20/2022    Procedure: COLONOSCOPY;  Surgeon: Mundo Velazquez MD;  Location: [facility] GI     ZZC ANESTH,DX ARTHROSCOPIC PROC KNEE JOINT       ZZC COLOSTOMY       ZC UNLISTED LAPAROSCOPY PROC,INTESTINE  7/25/2003    Exploratory Lap. Lysis of adhesions. Sigmoid colostomy takedown and stapled coloproctostomy.     [facility] PTCA SINGLE VESSEL      2 stents       Prior to Admission Medications   Prior to Admission Medications   Prescriptions Last Dose Informant Patient Reported? Taking?   Cinnamon Bark POWD Past Week  Yes Yes   MAGNESIUM OXIDE PO Past Week at Unknown time  Yes Yes   Sig: Take 500 mg by mouth daily   Multiple Vitamins-Minerals (CENTRUM MEN PO) Past Week at Unknown time  Yes Yes   Sig: Take 1 tablet by mouth daily   POTASSIUM CHLORIDE CR PO Past Week at Unknown time  Yes Yes   Sig: Take 10 mEq by mouth daily.   SEMAGLUTIDE, 1 MG/DOSE, SC 9/6/2022  Yes Yes   Sig: Inject 1 mg Subcutaneous once a week Tuesdays   TURMERIC PO Past Week at Unknown time  Yes Yes   acetaminophen (TYLENOL) 325 MG tablet 9/12/2022 at 0500  No Yes   Sig: Take 2 tablets (650 mg) by mouth every 6 hours as needed for other (mild pain)   aspirin (ASA) 81 MG chewable tablet 9/12/2022 at 324mg  Yes Yes   Sig: Take 81 mg by mouth daily   buPROPion (WELLBUTRIN SR) 150 MG 12 hr tablet 9/11/2022 at am  Yes Yes   Sig: Take 150 mg by mouth daily   clopidogrel (PLAVIX) 75 MG tablet 9/3/2022  Yes Yes   Sig: Take 1 tablet by mouth daily   lisinopril (ZESTRIL) 10 MG tablet 9/10/2022 at pm  Yes Yes   Sig: Take 5 mg by mouth daily   metoprolol succinate ER (TOPROL XL) 25 MG 24 hr tablet Past Week at Unknown time  Yes Yes   Sig: Take 12.5 mg by mouth daily   nitroglycerin (NITROSTAT) 0.4 MG SL tablet  at prn  No Yes   Sig: Place 1 tablet under the tongue every 5 minutes as needed.   order for DME   No No   Sig: Equipment being ordered: Walker ()  Treatment Diagnosis:  s/p joint replacement   oxyCODONE (ROXICODONE) 5 MG tablet  at not started  No No   Sig: Take 1-2 tablets (5-10 mg) by mouth every 4 hours as needed for moderate to severe pain   rivaroxaban ANTICOAGULANT (XARELTO) 10 MG TABS tablet  at not started  No No   Sig: Take 1 tablet (10 mg) by mouth daily   rosuvastatin (CRESTOR) 40 MG tablet 2022 at am  Yes Yes   Sig: Take 40 mg by mouth daily   senna-docusate (SENOKOT-S/PERICOLACE) 8.6-50 MG tablet  at not started  No No   Sig: Take 1-2 tablets by mouth 2 times daily Take while on oral narcotics to prevent or treat constipation.   traMADol (ULTRAM) 50 MG tablet   Yes No   Sig: Take 100 mg by mouth 3 times daily as needed      Facility-Administered Medications: None         Allergies   Allergies   Allergen Reactions     No Known Allergies      Orlistat      Other reaction(s): Incontinence of feces       Social History    reports that he quit smoking about 22 years ago. He has quit using smokeless tobacco. He reports previous alcohol use. He reports previous drug use.    Family History   Family History   Problem Relation Age of Onset     Lipids Mother      Psychotic Disorder Mother         anxiety     Hypertension Mother      Prostate Cancer Father          at 74     Diabetes Sister        Review of Systems   The comprehensive 10 point Review of Systems is negative other than noted in the HPI or here.     Physical Exam   Vital Signs with Ranges  Temp:  [97  F (36.1  C)-99.1  F (37.3  C)] 97.8  F (36.6  C)  Pulse:  [] 124  Resp:  [0-29] 12  BP: ()/() 108/77  FiO2 (%):  [2 %] 2 %  SpO2:  [93 %-100 %] 100 %  Wt Readings from Last 4 Encounters:   22 123.4 kg (272 lb)   22 120.7 kg (266 lb)   22 120.7 kg (266 lb)   22 120.7 kg (266 lb)     I/O last 3 completed shifts:  In: 300 [P.O.:300]  Out: 750 [Urine:750]      Vitals: /77   Pulse (!) 124   Temp 97.8  F (36.6  C) (Oral)   Resp 12   Wt 123.4 kg (272 lb)   SpO2 100%    BMI 39.03 kg/m      GENERAL: Healthy, alert and no distress  EYES: Eyes grossly normal to inspection.  No discharge or erythema, or obvious scleral/conjunctival abnormalities.  RESP: No audible wheeze, cough, or visible cyanosis.  No visible retractions or increased work of breathing.    SKIN: Visible skin clear. No significant rash, abnormal pigmentation or lesions.  NEURO: Cranial nerves grossly intact.  Mentation and speech appropriate for age.  PSYCH: Mentation appears normal, affect normal/bright, judgement and insight intact, normal speech and appearance well-groomed.    No lab results found in last 7 days.    Invalid input(s): TROPONINIES    Recent Labs   Lab 09/13/22  0553 09/12/22 1929 09/12/22  1244   WBC 13.9*  --  21.1*   HGB 9.0* 11.8* 9.5*   MCV 89  --  92     --  193     --  138   POTASSIUM 4.5  --  3.9   CHLORIDE 106  --  109   CO2 23  --  19*   BUN 22  --  15   CR 0.79  --  0.47*   GFRESTIMATED >90  --  >90   ANIONGAP 7  --  10   ASHLEY 8.7  --  7.6*   *  --  190*   ALBUMIN  --   --  2.3*   PROTTOTAL  --   --  4.5*   BILITOTAL  --   --  0.2   ALKPHOS  --   --  55   ALT  --   --  19   AST  --   --  15     Recent Labs   Lab Test 01/26/18  0000 10/18/17  0000   CHOL 321* 272*   HDL 39* 39*   * 184   TRIG 209* 245*     Recent Labs   Lab 09/13/22 0553 09/12/22 1929 09/12/22  1244   WBC 13.9*  --  21.1*   HGB 9.0* 11.8* 9.5*   HCT 27.4*  --  28.6*   MCV 89  --  92     --  193     No results for input(s): PH, PHV, PO2, PO2V, SAT, PCO2, PCO2V, HCO3, HCO3V in the last 168 hours.  No results for input(s): NTBNPI, NTBNP in the last 168 hours.  No results for input(s): DD in the last 168 hours.  No results for input(s): SED, CRP in the last 168 hours.  Recent Labs   Lab 09/13/22  0553 09/12/22  1244    193     No results for input(s): TSH in the last 168 hours.  No results for input(s): COLOR, APPEARANCE, URINEGLC, URINEBILI, URINEKETONE, SG, UBLD, URINEPH, PROTEIN,  UROBILINOGEN, NITRITE, LEUKEST, RBCU, WBCU in the last 168 hours.    Imaging:  Recent Results (from the past 48 hour(s))   CT Chest Pulmonary Embolism w Contrast    Narrative    CT CHEST PULMONARY EMBOLISM WITH CONTRAST 9/12/2022 1:44 PM    CLINICAL HISTORY: Postoperative chest pain  TECHNIQUE: CT angiogram chest during arterial phase injection IV  contrast. 2D and 3D MIP reconstructions were performed by the CT  technologist. Dose reduction techniques were used.     CONTRAST: 100 ml [id number]    COMPARISON: None.    FINDINGS:  ANGIOGRAM CHEST: Pulmonary arteries are normal caliber and negative  for pulmonary emboli. Thoracic aorta is negative for dissection. No CT  evidence of right heart strain.    LUNGS AND PLEURA: Groundglass densities are noted in the bilateral  lungs which could represent mild vascular congestion. No pneumothorax,  infiltrate or effusion is identified. No significant nodule is seen.    MEDIASTINUM/AXILLAE: Heart is upper normal size. There are extensive  coronary artery calcifications and/or stents. There are calcifications  around the aortic valve annulus. There is ectasia/mild aneurysmal  dilatation of the aortic root at 4.4 cm in diameter. Ascending  thoracic aorta measures up to 3.9 cm in diameter and is mildly  ectatic. No mediastinal, hilar, or axillary lymphadenopathy is  identified. Visualized portions of the thyroid are unremarkable.    UPPER ABDOMEN: A few calcifications are seen in the inferior right  lobe of the liver likely represent chronic granulomatous disease or  prior injury. Tiny nonobstructive intrarenal calculi measure less than  0.3 cm bilaterally. Visualized portions of the upper abdominal  contents are otherwise grossly unremarkable. Evaluation of the solid  organs of the upper abdomen is limited due to lack of IV contrast.    MUSCULOSKELETAL: There are severe degenerative changes of the  bilateral shoulders. Degenerative changes of the spine are also noted.  No  aggressive osseous lesions or acute osseous fractures are  identified. Ossified intra-articular free bodies are seen in the  bilateral shoulders, worse in the left shoulder.      Impression    IMPRESSION:  1.  No evidence for pulmonary artery embolism is identified.  2.  Mild groundglass densities in the lungs could represent vascular  congestion.  3.  Extensive coronary artery calcifications and/or stents. This could  be associated with the patient's recent symptoms.  4.  Severe degenerative changes of the bilateral shoulders.  Degenerative changes of the spine are also noted.   XR Pelvis Port 1/2 Views    Narrative    PELVIS PORTABLE ONE VIEW   9/12/2022 1:46 PM     HISTORY: Status post hip surgery    COMPARISON: Pelvis and left hip x-rays dated 8/4/2022.      Impression    IMPRESSION:  1. Single view of the lower pelvis was provided demonstrating new left  hip arthroplasty. Hardware components are well seated without evidence  for hardware failure or periprosthetic fracture. Gas is seen in the  soft tissues around left hip, consistent with recent operative status.    2. Right hip arthroplasty components are again noted and demonstrate  no evidence for hardware failure or periprosthetic fracture.  3. Only a single view was obtained due to patient issues at the time  of the exam.       Echo:  No results found for this or any previous visit (from the past 4320 hour(s)).

## 2022-09-14 ENCOUNTER — APPOINTMENT (OUTPATIENT)
Dept: PHYSICAL THERAPY | Facility: CLINIC | Age: 62
DRG: 250 | End: 2022-09-14
Payer: COMMERCIAL

## 2022-09-14 LAB
ABO/RH(D): NORMAL
ALBUMIN SERPL-MCNC: 2.6 G/DL (ref 3.4–5)
ALP SERPL-CCNC: 52 U/L (ref 40–150)
ALT SERPL W P-5'-P-CCNC: 79 U/L (ref 0–70)
ANION GAP SERPL CALCULATED.3IONS-SCNC: 4 MMOL/L (ref 3–14)
ANTIBODY SCREEN: NEGATIVE
AST SERPL W P-5'-P-CCNC: 198 U/L (ref 0–45)
BILIRUB SERPL-MCNC: 0.5 MG/DL (ref 0.2–1.3)
BLD PROD TYP BPU: NORMAL
BLOOD COMPONENT TYPE: NORMAL
BUN SERPL-MCNC: 13 MG/DL (ref 7–30)
CALCIUM SERPL-MCNC: 8.4 MG/DL (ref 8.5–10.1)
CHLORIDE BLD-SCNC: 104 MMOL/L (ref 94–109)
CO2 SERPL-SCNC: 27 MMOL/L (ref 20–32)
CODING SYSTEM: NORMAL
CREAT SERPL-MCNC: 0.67 MG/DL (ref 0.66–1.25)
CROSSMATCH: NORMAL
ERYTHROCYTE [DISTWIDTH] IN BLOOD BY AUTOMATED COUNT: 13.1 % (ref 10–15)
GFR SERPL CREATININE-BSD FRML MDRD: >90 ML/MIN/1.73M2
GLUCOSE BLD-MCNC: 154 MG/DL (ref 70–99)
HCT VFR BLD AUTO: 23 % (ref 40–53)
HGB BLD-MCNC: 7.4 G/DL (ref 13.3–17.7)
HGB BLD-MCNC: 7.5 G/DL (ref 13.3–17.7)
ISSUE DATE AND TIME: NORMAL
MCH RBC QN AUTO: 29.9 PG (ref 26.5–33)
MCHC RBC AUTO-ENTMCNC: 32.6 G/DL (ref 31.5–36.5)
MCV RBC AUTO: 92 FL (ref 78–100)
PLATELET # BLD AUTO: 134 10E3/UL (ref 150–450)
POTASSIUM BLD-SCNC: 4.7 MMOL/L (ref 3.4–5.3)
PROT SERPL-MCNC: 5.6 G/DL (ref 6.8–8.8)
RBC # BLD AUTO: 2.51 10E6/UL (ref 4.4–5.9)
SODIUM SERPL-SCNC: 135 MMOL/L (ref 133–144)
SPECIMEN EXPIRATION DATE: NORMAL
UNIT ABO/RH: NORMAL
UNIT NUMBER: NORMAL
UNIT STATUS: NORMAL
UNIT TYPE ISBT: 9500
WBC # BLD AUTO: 11.4 10E3/UL (ref 4–11)

## 2022-09-14 PROCEDURE — 250N000013 HC RX MED GY IP 250 OP 250 PS 637: Performed by: NURSE PRACTITIONER

## 2022-09-14 PROCEDURE — 99233 SBSQ HOSP IP/OBS HIGH 50: CPT | Performed by: INTERNAL MEDICINE

## 2022-09-14 PROCEDURE — 250N000013 HC RX MED GY IP 250 OP 250 PS 637: Performed by: INTERNAL MEDICINE

## 2022-09-14 PROCEDURE — 36415 COLL VENOUS BLD VENIPUNCTURE: CPT | Performed by: ORTHOPAEDIC SURGERY

## 2022-09-14 PROCEDURE — 82040 ASSAY OF SERUM ALBUMIN: CPT | Performed by: INTERNAL MEDICINE

## 2022-09-14 PROCEDURE — 250N000011 HC RX IP 250 OP 636: Performed by: INTERNAL MEDICINE

## 2022-09-14 PROCEDURE — 97110 THERAPEUTIC EXERCISES: CPT | Mod: GP

## 2022-09-14 PROCEDURE — 86901 BLOOD TYPING SEROLOGIC RH(D): CPT | Performed by: INTERNAL MEDICINE

## 2022-09-14 PROCEDURE — 210N000002 HC R&B HEART CARE

## 2022-09-14 PROCEDURE — 36415 COLL VENOUS BLD VENIPUNCTURE: CPT | Performed by: INTERNAL MEDICINE

## 2022-09-14 PROCEDURE — P9016 RBC LEUKOCYTES REDUCED: HCPCS | Performed by: INTERNAL MEDICINE

## 2022-09-14 PROCEDURE — 86850 RBC ANTIBODY SCREEN: CPT | Performed by: INTERNAL MEDICINE

## 2022-09-14 PROCEDURE — 250N000011 HC RX IP 250 OP 636: Performed by: NURSE PRACTITIONER

## 2022-09-14 PROCEDURE — 85018 HEMOGLOBIN: CPT | Performed by: ORTHOPAEDIC SURGERY

## 2022-09-14 PROCEDURE — 85027 COMPLETE CBC AUTOMATED: CPT | Performed by: INTERNAL MEDICINE

## 2022-09-14 PROCEDURE — 80053 COMPREHEN METABOLIC PANEL: CPT | Performed by: INTERNAL MEDICINE

## 2022-09-14 PROCEDURE — 99232 SBSQ HOSP IP/OBS MODERATE 35: CPT | Performed by: INTERNAL MEDICINE

## 2022-09-14 PROCEDURE — 86923 COMPATIBILITY TEST ELECTRIC: CPT | Performed by: INTERNAL MEDICINE

## 2022-09-14 RX ORDER — FUROSEMIDE 10 MG/ML
10 INJECTION INTRAMUSCULAR; INTRAVENOUS ONCE
Status: COMPLETED | OUTPATIENT
Start: 2022-09-14 | End: 2022-09-14

## 2022-09-14 RX ADMIN — POLYETHYLENE GLYCOL 3350 17 G: 17 POWDER, FOR SOLUTION ORAL at 06:40

## 2022-09-14 RX ADMIN — METHOCARBAMOL 500 MG: 500 TABLET ORAL at 09:00

## 2022-09-14 RX ADMIN — FUROSEMIDE 10 MG: 10 INJECTION, SOLUTION INTRAMUSCULAR; INTRAVENOUS at 12:19

## 2022-09-14 RX ADMIN — OXYCODONE HYDROCHLORIDE 10 MG: 5 TABLET ORAL at 20:08

## 2022-09-14 RX ADMIN — SENNOSIDES AND DOCUSATE SODIUM 1 TABLET: 50; 8.6 TABLET ORAL at 08:54

## 2022-09-14 RX ADMIN — MORPHINE SULFATE 2 MG: 2 INJECTION, SOLUTION INTRAMUSCULAR; INTRAVENOUS at 16:31

## 2022-09-14 RX ADMIN — MORPHINE SULFATE 2 MG: 2 INJECTION, SOLUTION INTRAMUSCULAR; INTRAVENOUS at 01:52

## 2022-09-14 RX ADMIN — MORPHINE SULFATE 2 MG: 2 INJECTION, SOLUTION INTRAMUSCULAR; INTRAVENOUS at 08:55

## 2022-09-14 RX ADMIN — ATORVASTATIN CALCIUM 80 MG: 80 TABLET, FILM COATED ORAL at 08:54

## 2022-09-14 RX ADMIN — CLOPIDOGREL BISULFATE 75 MG: 75 TABLET ORAL at 08:54

## 2022-09-14 RX ADMIN — OXYCODONE HYDROCHLORIDE 10 MG: 5 TABLET ORAL at 05:10

## 2022-09-14 RX ADMIN — METHOCARBAMOL 500 MG: 500 TABLET ORAL at 16:38

## 2022-09-14 RX ADMIN — PANTOPRAZOLE SODIUM 40 MG: 40 TABLET, DELAYED RELEASE ORAL at 06:40

## 2022-09-14 RX ADMIN — ASPIRIN 81 MG: 81 TABLET, COATED ORAL at 08:54

## 2022-09-14 RX ADMIN — SENNOSIDES AND DOCUSATE SODIUM 1 TABLET: 50; 8.6 TABLET ORAL at 21:47

## 2022-09-14 RX ADMIN — METOPROLOL SUCCINATE 12.5 MG: 25 TABLET, EXTENDED RELEASE ORAL at 12:19

## 2022-09-14 RX ADMIN — OXYCODONE HYDROCHLORIDE 10 MG: 5 TABLET ORAL at 13:43

## 2022-09-14 ASSESSMENT — ACTIVITIES OF DAILY LIVING (ADL)
ADLS_ACUITY_SCORE: 44

## 2022-09-14 NOTE — SUMMARY OF CARE
"Patient's Hgb is 7.5 this am. Dr. Briseno is aware.  If needed, blood transfusion can be done per CHALO Sheridan.   Patient has complaints of bilateral upper extremity swelling. Dr. Spain ordered Lasix IV.   Arvin is aware of patient's BP and HR trends. Per CHALO Rosa, \"give the am Metropolol\".   Patient is resting in bed at this time. No new complaints. Will continue to monitor.     "

## 2022-09-14 NOTE — PROGRESS NOTES
ORTHO DAILY PROGRESS NOTE    POD #2   Markie Rico is a 61 year old male  s/p Procedure(s):  right total hip arthroplasty at [facility]       He had chest pain, and had an MI, transferred to [facility]  Coronary Angiogram  Percutaneous Coronary Intervention -  on 2022  Had 1 unit packed red blood cells     Interim: light headed when getting up, unable to stand or sit for therapist.  On Plavix and ASA now.    SUBJECTIVE:  Complaints: chest pain gone.  Left hip less painful.  Nausea: none    OBJECTIVE:  BP 95/60 (BP Location: Left arm, Patient Position: Semi-Sunshine's, Cuff Size: Adult Regular)   Pulse 99   Temp 98.2  F (36.8  C) (Oral)   Resp 18   Wt 123.9 kg (273 lb 3.2 oz)   SpO2 98%   BMI 39.20 kg/m      Temp (24hrs), Av.3  F (36.8  C), Min:97.8  F (36.6  C), Max:98.7  F (37.1  C)      Hemovac output:  NA    General: in no apparent distress,  Awake, alert    Incision is covered, moderate thigh swelling.  CMSI   Toes warm and well perfused  Calves soft and nontender to squeeze.      Recent Labs   Lab Test 22  0604 22  0553 22  1929 22  1244   HGB 7.5* 9.0* 11.8* 9.5*   * 174  --  193     Recent Labs   Lab Test 22  0604 22  0553 22  1244   POTASSIUM 4.7 4.5  4.5 3.9   BUN  15   CR 0.67 0.82  0.79 0.47*     Recent Labs   Lab Test 18  1444   INR 0.97         PT:  Bed exercises.           ASSESSMENT:  Status post left total hip arthroplasty, complicated by MI (his 4th).  Acute blood loss anemia.  His hgb is dropping. There is some concern that there could be an active bleed in the hip.  This could be the reason for his low bp and tachycardia.    PLAN:  Continue PHYSICAL THERAPY-- at bedside if needed  Check hgb again later today.   Transfusion per Cardiology discretion.  May need to involve IR if hgb continues to drop, or I wonder if it's possible to discontinue Plavix for a bit.  Discharge planned: open ended at this point. .    J.  Leonard Briseno MD  Orthopedic Surgery   [facility]  P: [phone]

## 2022-09-14 NOTE — PROGRESS NOTES
[facility]    Medicine Progress Note - Hospitalist Service    Date of Admission:  9/12/2022    Assessment & Plan             61 year old male with PMH significant for history of MI, HLD, morbid obesity, CAD, HTN, and OA of the bilateral hips who was emergently air lifted to the ED at [facility] on 9/12 after he underwent an elective total left hip replacement in [place] on 9/12/22 and in postoperative unit he reported chest pain, MI.  Work-up in the ED revealed anterior STEMI. Cardiology was consulted. The patient was emergently taken to the Cath lab for PCI of the LAD and further evaluation and management.   Patient is a direct admit           STEMI postoperatively after right hip total arthroplasty at [facility]  History of PCI to LAD and RCA; history of possible 3 stenting procedures at outside facility  Coronary Artery Disease Involving the Native Coronary Artery of Native Heart without Angina Pectoris  Ischemic Cardiomyopathy (LVEF 35%)  HTN  HLD  Direct admit after being found to have had a STEMI following left total hip surgery on 9/12.   ECG suggestive of anterior STEMI with possible stent thrombosis.   Emergently flown to [facility] ED and taken to the cath lab for intervention.   -9/12: Emergently patient was taken to Cath Lab - noted to have chronic total occlusion LAD that was ballooned  Echocardiogram pending  Continue aspirin,lisinopril, metoprolol, atorvastatin  Cardiology consult appreciated  Telemetry monitor  Cardiac rehab  Elevated AST likely from MI monitor trend   Blood pressure is soft-adjust cardiac medications as per blood pressure  Plavix on hold 9/114/22- as per cardiology due to concern of acute blood loss anemia postoperatively         S/P Left MARIMAR at [facility]  on 9/12/2022  Post-op Blood Loss Anemia  Surgery for left MARIMAR in [place] today and immediately began to have chest pain in the PACU.  -Underwent Left MARIMAR on 9/12 with Dr. Briseno at  [facility]. Spinal anesthesia with EBL of 650 mL.   Orthopedic consult appreciated  Continue pain medication as needed oxycodone with senna, Tylenol  Postop cares per surgery  Postoperative anticoagulation especially in setting of dual antiplatelet therapy to be be finalized between cardiology and orthopedic team         Acute postoperative blood loss anemia  Got blood transfusion earlier  Repeat hemoglobin again.  As per Ortho if hemoglobin is dropping then need IR evaluation  Given drop in hemoglobin plan to hold Plavix short-term.  Plan to keep hemoglobin 8 or above as per cardiology.  Was given IV Lasix for edema.  1 unit of blood was ordered.       Elevated sugars  Check A1c       Hypertension continue home medication           Postop elevated leukocytosis likely reactive  Monitor closely           Elevated AST improving significantly  likely from MI   monitor trend            Diet: Low Saturated Fat Na <2400 mg    DVT Prophylaxis: As per surgical team  Caro Catheter: Not present  Central Lines: None  Cardiac Monitoring: ACTIVE order. Indication: AMI (NSTEMI/ STEMI) (48 hours)  Code Status: Full Code      Disposition Plan      Expected Discharge Date: 09/14/2022      Destination: home with family     Versus TCU         IGOR BERG MD  Hospitalist Service  [facility]  Securely message with the Vocera Web Console (learn more here)  Text page via [employer] Paging/Directory         Clinically Significant Risk Factors Present on Admission     ______________________________________________________________________      Interval History     Patient is in room  No major overnight event  No chest pain no worsening shortness of breath no dizziness  No abdominal pain  Surgical pain is stable  No new complains        Data reviewed today: I reviewed all medications, new labs and imaging results over the last 24 hours.       Physical Exam   Vital Signs: Temp: 98.2  F (36.8  C) Temp src: Oral BP: 95/60  Pulse: 99   Resp: 18 SpO2: 98 % O2 Device: None (Room air)    Weight: 273 lbs 3.2 oz    General: Lying comfortably, not in distress  HEENT: no icterus, no obvious masses, normal conjunctiva, MM moist  No JVD  CVS: RRR, no gallops/murmur  Pulm: AEBE clear  Abd: Soft, nontender, not distended  Psych- normal mood  Neuro: Alert oriented times 3, moving all 4 limbs.  Ext: Mild edema in limbs

## 2022-09-14 NOTE — SUMMARY OF CARE
1030 Spoke to CHALO Rosa regarding patient's BP trends overnight and this am. CHALO Rosa said to give am Metropolol but hold Lisinopril dose.   Also spoke about critical Trop. No new orders.  Anticoagulation can be determined by Ortho per CHALO Sheridan.    1040 Spoke to Dr. Damon Bolanos regarding critical AST result. No new orders.

## 2022-09-15 ENCOUNTER — APPOINTMENT (OUTPATIENT)
Dept: PHYSICAL THERAPY | Facility: CLINIC | Age: 62
DRG: 250 | End: 2022-09-15
Payer: COMMERCIAL

## 2022-09-15 ENCOUNTER — APPOINTMENT (OUTPATIENT)
Dept: CT IMAGING | Facility: CLINIC | Age: 62
DRG: 250 | End: 2022-09-15
Attending: PHYSICIAN ASSISTANT
Payer: COMMERCIAL

## 2022-09-15 LAB
ANION GAP SERPL CALCULATED.3IONS-SCNC: 8 MMOL/L (ref 3–14)
ATRIAL RATE - MUSE: 121 BPM
ATRIAL RATE - MUSE: 87 BPM
BLD PROD TYP BPU: NORMAL
BLOOD COMPONENT TYPE: NORMAL
BUN SERPL-MCNC: 11 MG/DL (ref 7–30)
CALCIUM SERPL-MCNC: 8.2 MG/DL (ref 8.5–10.1)
CHLORIDE BLD-SCNC: 105 MMOL/L (ref 94–109)
CO2 SERPL-SCNC: 24 MMOL/L (ref 20–32)
CODING SYSTEM: NORMAL
CREAT SERPL-MCNC: 0.55 MG/DL (ref 0.66–1.25)
CROSSMATCH: NORMAL
DIASTOLIC BLOOD PRESSURE - MUSE: NORMAL MMHG
DIASTOLIC BLOOD PRESSURE - MUSE: NORMAL MMHG
ERYTHROCYTE [DISTWIDTH] IN BLOOD BY AUTOMATED COUNT: 13.6 % (ref 10–15)
GFR SERPL CREATININE-BSD FRML MDRD: >90 ML/MIN/1.73M2
GLUCOSE BLD-MCNC: 143 MG/DL (ref 70–99)
HCT VFR BLD AUTO: 23.6 % (ref 40–53)
HGB BLD-MCNC: 7.6 G/DL (ref 13.3–17.7)
HGB BLD-MCNC: 7.9 G/DL (ref 13.3–17.7)
HGB BLD-MCNC: 8.2 G/DL (ref 13.3–17.7)
INTERPRETATION ECG - MUSE: NORMAL
INTERPRETATION ECG - MUSE: NORMAL
ISSUE DATE AND TIME: NORMAL
MCH RBC QN AUTO: 29.3 PG (ref 26.5–33)
MCHC RBC AUTO-ENTMCNC: 33.5 G/DL (ref 31.5–36.5)
MCV RBC AUTO: 87 FL (ref 78–100)
P AXIS - MUSE: 24 DEGREES
P AXIS - MUSE: 8 DEGREES
PLATELET # BLD AUTO: 130 10E3/UL (ref 150–450)
POTASSIUM BLD-SCNC: 3.8 MMOL/L (ref 3.4–5.3)
PR INTERVAL - MUSE: 152 MS
PR INTERVAL - MUSE: 160 MS
QRS DURATION - MUSE: 114 MS
QRS DURATION - MUSE: 124 MS
QT - MUSE: 312 MS
QT - MUSE: 366 MS
QTC - MUSE: 440 MS
QTC - MUSE: 443 MS
R AXIS - MUSE: 88 DEGREES
R AXIS - MUSE: 89 DEGREES
RBC # BLD AUTO: 2.7 10E6/UL (ref 4.4–5.9)
SODIUM SERPL-SCNC: 137 MMOL/L (ref 133–144)
SYSTOLIC BLOOD PRESSURE - MUSE: NORMAL MMHG
SYSTOLIC BLOOD PRESSURE - MUSE: NORMAL MMHG
T AXIS - MUSE: 37 DEGREES
T AXIS - MUSE: 66 DEGREES
UNIT ABO/RH: NORMAL
UNIT NUMBER: NORMAL
UNIT STATUS: NORMAL
UNIT TYPE ISBT: 9500
VENTRICULAR RATE- MUSE: 121 BPM
VENTRICULAR RATE- MUSE: 87 BPM
WBC # BLD AUTO: 11.7 10E3/UL (ref 4–11)

## 2022-09-15 PROCEDURE — 99232 SBSQ HOSP IP/OBS MODERATE 35: CPT | Performed by: INTERNAL MEDICINE

## 2022-09-15 PROCEDURE — 250N000011 HC RX IP 250 OP 636: Performed by: INTERNAL MEDICINE

## 2022-09-15 PROCEDURE — P9016 RBC LEUKOCYTES REDUCED: HCPCS | Performed by: INTERNAL MEDICINE

## 2022-09-15 PROCEDURE — 85027 COMPLETE CBC AUTOMATED: CPT | Performed by: ORTHOPAEDIC SURGERY

## 2022-09-15 PROCEDURE — 36415 COLL VENOUS BLD VENIPUNCTURE: CPT | Performed by: INTERNAL MEDICINE

## 2022-09-15 PROCEDURE — 250N000013 HC RX MED GY IP 250 OP 250 PS 637: Performed by: NURSE PRACTITIONER

## 2022-09-15 PROCEDURE — 85018 HEMOGLOBIN: CPT | Performed by: INTERNAL MEDICINE

## 2022-09-15 PROCEDURE — 250N000009 HC RX 250: Performed by: INTERNAL MEDICINE

## 2022-09-15 PROCEDURE — 250N000013 HC RX MED GY IP 250 OP 250 PS 637: Performed by: INTERNAL MEDICINE

## 2022-09-15 PROCEDURE — 36415 COLL VENOUS BLD VENIPUNCTURE: CPT | Performed by: ORTHOPAEDIC SURGERY

## 2022-09-15 PROCEDURE — 250N000011 HC RX IP 250 OP 636: Performed by: NURSE PRACTITIONER

## 2022-09-15 PROCEDURE — 82310 ASSAY OF CALCIUM: CPT | Performed by: INTERNAL MEDICINE

## 2022-09-15 PROCEDURE — 74174 CTA ABD&PLVS W/CONTRAST: CPT

## 2022-09-15 PROCEDURE — 97530 THERAPEUTIC ACTIVITIES: CPT | Mod: GP

## 2022-09-15 PROCEDURE — 210N000002 HC R&B HEART CARE

## 2022-09-15 RX ORDER — FUROSEMIDE 10 MG/ML
10 INJECTION INTRAMUSCULAR; INTRAVENOUS ONCE
Status: DISCONTINUED | OUTPATIENT
Start: 2022-09-15 | End: 2022-09-17

## 2022-09-15 RX ORDER — FUROSEMIDE 10 MG/ML
10 INJECTION INTRAMUSCULAR; INTRAVENOUS ONCE
Status: COMPLETED | OUTPATIENT
Start: 2022-09-15 | End: 2022-09-15

## 2022-09-15 RX ORDER — IOPAMIDOL 755 MG/ML
80 INJECTION, SOLUTION INTRAVASCULAR ONCE
Status: COMPLETED | OUTPATIENT
Start: 2022-09-15 | End: 2022-09-15

## 2022-09-15 RX ADMIN — ATORVASTATIN CALCIUM 80 MG: 80 TABLET, FILM COATED ORAL at 09:08

## 2022-09-15 RX ADMIN — ASPIRIN 81 MG: 81 TABLET, COATED ORAL at 09:08

## 2022-09-15 RX ADMIN — FUROSEMIDE 10 MG: 10 INJECTION, SOLUTION INTRAMUSCULAR; INTRAVENOUS at 11:06

## 2022-09-15 RX ADMIN — METHOCARBAMOL 500 MG: 500 TABLET ORAL at 21:23

## 2022-09-15 RX ADMIN — METHOCARBAMOL 500 MG: 500 TABLET ORAL at 07:53

## 2022-09-15 RX ADMIN — OXYCODONE HYDROCHLORIDE 10 MG: 5 TABLET ORAL at 00:06

## 2022-09-15 RX ADMIN — OXYCODONE HYDROCHLORIDE 10 MG: 5 TABLET ORAL at 04:43

## 2022-09-15 RX ADMIN — OXYCODONE HYDROCHLORIDE 10 MG: 5 TABLET ORAL at 09:09

## 2022-09-15 RX ADMIN — PANTOPRAZOLE SODIUM 40 MG: 40 TABLET, DELAYED RELEASE ORAL at 07:53

## 2022-09-15 RX ADMIN — LISINOPRIL 5 MG: 5 TABLET ORAL at 09:08

## 2022-09-15 RX ADMIN — METHOCARBAMOL 500 MG: 500 TABLET ORAL at 00:06

## 2022-09-15 RX ADMIN — SENNOSIDES AND DOCUSATE SODIUM 1 TABLET: 50; 8.6 TABLET ORAL at 22:43

## 2022-09-15 RX ADMIN — SODIUM CHLORIDE 80 ML: 9 INJECTION, SOLUTION INTRAVENOUS at 14:48

## 2022-09-15 RX ADMIN — METHOCARBAMOL 500 MG: 500 TABLET ORAL at 16:08

## 2022-09-15 RX ADMIN — METOPROLOL SUCCINATE 12.5 MG: 25 TABLET, EXTENDED RELEASE ORAL at 09:08

## 2022-09-15 RX ADMIN — SENNOSIDES AND DOCUSATE SODIUM 1 TABLET: 50; 8.6 TABLET ORAL at 09:08

## 2022-09-15 RX ADMIN — MORPHINE SULFATE 2 MG: 2 INJECTION, SOLUTION INTRAMUSCULAR; INTRAVENOUS at 02:45

## 2022-09-15 RX ADMIN — IOPAMIDOL 80 ML: 755 INJECTION, SOLUTION INTRAVENOUS at 14:49

## 2022-09-15 RX ADMIN — OXYCODONE HYDROCHLORIDE 10 MG: 5 TABLET ORAL at 14:41

## 2022-09-15 RX ADMIN — OXYCODONE HYDROCHLORIDE 10 MG: 5 TABLET ORAL at 20:11

## 2022-09-15 ASSESSMENT — ACTIVITIES OF DAILY LIVING (ADL)
ADLS_ACUITY_SCORE: 44
ADLS_ACUITY_SCORE: 44
ADLS_ACUITY_SCORE: 42
ADLS_ACUITY_SCORE: 41
ADLS_ACUITY_SCORE: 41
ADLS_ACUITY_SCORE: 42
ADLS_ACUITY_SCORE: 41
ADLS_ACUITY_SCORE: 42
ADLS_ACUITY_SCORE: 44
ADLS_ACUITY_SCORE: 44
ADLS_ACUITY_SCORE: 50
ADLS_ACUITY_SCORE: 44

## 2022-09-15 NOTE — CONSULTS
Interventional Radiology - Consult Note  Inpatient - [facility]  9/15/2022    IR Brief Note    Consult received for patient with acute blood loss anemia s/p lt hip surgery 9/12 and ACS s/p balloon angioplasty to LAD. Plavix on hold. Has received 1u PRBCs. Discussed with hospitalist and IR attending Dr Gonzales. STAT CTA abd/pel ordered. Will follow.    Griffin Rajan PA-C  Interventional Radiology  [phone] (IR)  [phone] ([facility] Office)

## 2022-09-15 NOTE — NURSING NOTE
"Chief Complaint   Patient presents with     RECHECK     1 week prior to right total hip arthroplasty       Initial Temp 97.1  F (36.2  C) (Temporal)  Ht 1.715 m (5' 7.5\") Estimated body mass index is 45.19 kg/(m^2) as calculated from the following:    Height as of 11/23/12: 1.721 m (5' 7.75\").    Weight as of 11/23/12: 133.8 kg (295 lb).  Medication Reconciliation: complete   YULIYA Hutchins  " Patient appeared to be sleeping at the beginning of 3rd shift. Will continue to monitor.

## 2022-09-15 NOTE — PROGRESS NOTES
[facility]    Cardiology Consultation       Assessment & Plan     1.  ACS with ST segment changes anteriorly post left hip surgery today, patient underwent emergent balloon angioplasty with restoration of VINITA-3 flow in LAD.  Disease noted elsewhere however treated medically at this time.  Please see Cath Lab report for full details  2.  History of PCI to LAD and RCA remotely.  Prior angiogram is from 2018 where additional PCI to RCA was performed at outside hospital  3.  Status post left hip surgery 9/12/2022  4.  Anemia for which he is received 1 unit of transfused packed red blood cells  5.  CT which demonstrates no evidence of pulmonary embolism or thoracic dissection.  6.  Hypertension  7.  Hyperlipidemia  8.  Elevated BMI  9.  Ischemic cardiomyopathy with an ejection fraction of 35% by outside evaluation.        Recommendations    1.  ACS with recent left hip surgery.  Underwent emergent balloon angioplasty of his LAD.  Not surprising that his troponin is significantly elevated.  Currently has no chest pain or active cardiac symptoms.  Plavix was held yesterday due to anemia.  Currently on monotherapy with aspirin only.    2.  Anemia.  Received 1 unit of packed red blood cells and hemoglobin is 7.9.  Symptomatically improved.  I will give him low-dose 10 mg IV Lasix x1 given edema and swelling noted currently.  Good urine response with yesterday's dose.  We will continue to hold his Plavix.    3.  Ischemic cardiomyopathy: Further decline in his LV systolic function.  Continue with supportive care and will reassess as outpatient for recovery.  Echocardiogram demonstrated enlargement of the aortic root and ascending aorta.  However more precise measurement was made by CT scan during this current admission.  The aortic root is moderately dilated at 4.4 cm and the ascending aorta is mildly dilated at 3.9 cm.  Continue to follow.    4.  Orthopedic surgery involved in his management.   Defer to them regarding his rehab etc.    5.  [facility] [place].  Upon discharge can follow-up with [place].  Continue to make improvement.    Too Spain MD, MD          HPI:    Events overnight reviewed.  Patient underwent emergent PCI with balloon angioplasty only.  Please note no additional stents were placed.  VINITA-3 flow was noted and symptoms have since resolved.  Received transfusion yesterday    Echo:  1. Technically difficult study despite the use of contrast to enhance  endocardial definition.  2. Left ventricular systolic function is severely reduced. The visual ejection  fraction is 25-30%.  3. Mid to distal anterior, anteroseptal hypokinesis, apex appears akinetic.  4. Valves are not well visualized; no gross valvular pathology identified.  5. Aortic root is poorly visualized; dilated aortic root measuring 4.6 cm,  ascending aorta measures 3.7. This may be overestimated. Consider CT for  further evalaution of aortic root/ascendin aorta measurements.        Primary Care Physician   [facility]      Patient Active Problem List   Diagnosis     History of acute myocardial infarction of inferior wall     Hyperlipidemia LDL goal <100     Morbid obesity (H)     Multiple joint pain     CAD (coronary artery disease)     Medication side effect     Benign essential hypertension     Primary osteoarthritis of both hips     Status post total replacement of right hip     Primary osteoarthritis of left hip       Past Medical History   I have reviewed this patient's medical history and updated it with pertinent information if needed.   Past Medical History:   Diagnosis Date     Diverticulitis      Old myocardial infarction     s/p successful emergent revascualrization, PTCR/stent at the LAD for acute MI     Old myocardial infarction 06/07/11    Non-Q-wave MI-[place]     Other and unspecified hyperlipidemia      Tobacco use disorder        Past Surgical  History   I have reviewed this patient's surgical history and updated it with pertinent information if needed.  Past Surgical History:   Procedure Laterality Date     ARTHROPLASTY HIP Right 2/12/2018    Procedure: ARTHROPLASTY HIP;  right total hip arthroplasty;  Surgeon: Vineet Bedolla MD;  Location: [facility] OR     ARTHROPLASTY HIP Left 9/12/2022    Preliminary Information:  Procedure: LEFT TOTAL HIP ARTHROPLASTY;  Surgeon: Vineet Briseno MD;  Location: [facility] OR     COLONOSCOPY N/A 5/20/2022    Procedure: COLONOSCOPY;  Surgeon: Mundo Velazquez MD;  Location: [facility] GI     CV CORONARY ANGIOGRAM N/A 9/12/2022    Procedure: Coronary Angiogram;  Surgeon: Js Brady MD;  Location: [facility] CARDIAC CATH LAB     CV PCI N/A 9/12/2022    Procedure: Percutaneous Coronary Intervention;  Surgeon: Js Brady MD;  Location:  HEART CARDIAC CATH LAB     ZZC ANESTH,DX ARTHROSCOPIC PROC KNEE JOINT       ZZC COLOSTOMY       ZZC UNLISTED LAPAROSCOPY PROC,INTESTINE  7/25/2003    Exploratory Lap. Lysis of adhesions. Sigmoid colostomy takedown and stapled coloproctostomy.     ZZ PTCA SINGLE VESSEL      2 stents       Prior to Admission Medications   Prior to Admission Medications   Prescriptions Last Dose Informant Patient Reported? Taking?   Cinnamon Bark POWD Past Week  Yes Yes   MAGNESIUM OXIDE PO Past Week at Unknown time  Yes Yes   Sig: Take 500 mg by mouth daily   Multiple Vitamins-Minerals (CENTRUM MEN PO) Past Week at Unknown time  Yes Yes   Sig: Take 1 tablet by mouth daily   POTASSIUM CHLORIDE CR PO Past Week at Unknown time  Yes Yes   Sig: Take 10 mEq by mouth daily.   SEMAGLUTIDE, 1 MG/DOSE, SC 9/6/2022  Yes Yes   Sig: Inject 1 mg Subcutaneous once a week Tuesdays   TURMERIC PO Past Week at Unknown time  Yes Yes   acetaminophen (TYLENOL) 325 MG tablet 9/12/2022 at 0500  No Yes   Sig: Take 2 tablets (650 mg) by mouth every 6 hours as needed for other (mild pain)   aspirin (ASA) 81 MG chewable tablet  2022 at 324mg  Yes Yes   Sig: Take 81 mg by mouth daily   buPROPion (WELLBUTRIN SR) 150 MG 12 hr tablet 2022 at am  Yes Yes   Sig: Take 150 mg by mouth daily   clopidogrel (PLAVIX) 75 MG tablet 9/3/2022  Yes Yes   Sig: Take 1 tablet by mouth daily   lisinopril (ZESTRIL) 10 MG tablet 9/10/2022 at pm  Yes Yes   Sig: Take 5 mg by mouth daily   metoprolol succinate ER (TOPROL XL) 25 MG 24 hr tablet Past Week at Unknown time  Yes Yes   Sig: Take 12.5 mg by mouth daily   nitroglycerin (NITROSTAT) 0.4 MG SL tablet  at prn  No Yes   Sig: Place 1 tablet under the tongue every 5 minutes as needed.   order for DME   No No   Sig: Equipment being ordered: Walker ()  Treatment Diagnosis: s/p joint replacement   oxyCODONE (ROXICODONE) 5 MG tablet  at not started  No No   Sig: Take 1-2 tablets (5-10 mg) by mouth every 4 hours as needed for moderate to severe pain   rivaroxaban ANTICOAGULANT (XARELTO) 10 MG TABS tablet  at not started  No No   Sig: Take 1 tablet (10 mg) by mouth daily   rosuvastatin (CRESTOR) 40 MG tablet 2022 at am  Yes Yes   Sig: Take 40 mg by mouth daily   senna-docusate (SENOKOT-S/PERICOLACE) 8.6-50 MG tablet  at not started  No No   Sig: Take 1-2 tablets by mouth 2 times daily Take while on oral narcotics to prevent or treat constipation.   traMADol (ULTRAM) 50 MG tablet   Yes No   Sig: Take 100 mg by mouth 3 times daily as needed      Facility-Administered Medications: None         Allergies   Allergies   Allergen Reactions     No Known Allergies      Orlistat      Other reaction(s): Incontinence of feces       Social History    reports that he quit smoking about 22 years ago. He has quit using smokeless tobacco. He reports previous alcohol use. He reports previous drug use.    Family History   Family History   Problem Relation Age of Onset     Lipids Mother      Psychotic Disorder Mother         anxiety     Hypertension Mother      Prostate Cancer Father          at 74     Diabetes  Sister        Review of Systems   The comprehensive 10 point Review of Systems is negative other than noted in the HPI or here.     Physical Exam   Vital Signs with Ranges  Temp:  [98.1  F (36.7  C)-99  F (37.2  C)] 98.8  F (37.1  C)  Pulse:  [] 96  Resp:  [8-25] 16  BP: ()/(54-76) 102/57  SpO2:  [76 %-100 %] 96 %  Wt Readings from Last 4 Encounters:   09/14/22 123.9 kg (273 lb 3.2 oz)   09/12/22 120.7 kg (266 lb)   08/04/22 120.7 kg (266 lb)   07/26/22 120.7 kg (266 lb)     I/O last 3 completed shifts:  In: 300   Out: 2100 [Urine:2100]      Vitals: /57   Pulse 96   Temp 98.8  F (37.1  C)   Resp 16   Wt 123.9 kg (273 lb 3.2 oz)   SpO2 96%   BMI 39.20 kg/m      GENERAL: Healthy, alert and no distress  EYES: Eyes grossly normal to inspection.  No discharge or erythema, or obvious scleral/conjunctival abnormalities.  RESP: No audible wheeze, cough, or visible cyanosis.  No visible retractions or increased work of breathing.    SKIN: Visible skin clear. No significant rash, abnormal pigmentation or lesions.  NEURO: Cranial nerves grossly intact.  Mentation and speech appropriate for age.  PSYCH: Mentation appears normal, affect normal/bright, judgement and insight intact, normal speech and appearance well-groomed.    No lab results found in last 7 days.    Invalid input(s): TROPONINIES    Recent Labs   Lab 09/15/22  0547 09/15/22  0107 09/14/22  1436 09/14/22  0604 09/13/22  0553   WBC 11.7*  --   --  11.4* 13.9*   HGB 7.9*  7.9* 7.9* 7.4* 7.5* 9.0*   MCV 87  --   --  92 89   *  --   --  134* 174     --   --  135 136  136   POTASSIUM 3.8  --   --  4.7 4.5  4.5   CHLORIDE 105  --   --  104 107  106   CO2 24  --   --  27 23  23   BUN 11  --   --  13 22  22   CR 0.55*  --   --  0.67 0.82  0.79   GFRESTIMATED >90  --   --  >90 >90  >90   ANIONGAP 8  --   --  4 6  7   ASHLEY 8.2*  --   --  8.4* 8.6  8.7   *  --   --  154* 156*  160*   ALBUMIN  --   --   --  2.6* 2.8*    PROTTOTAL  --   --   --  5.6* 5.7*   BILITOTAL  --   --   --  0.5 0.8   ALKPHOS  --   --   --  52 55   ALT  --   --   --  79* 119*   AST  --   --   --  198* 593*     Recent Labs   Lab Test 01/26/18  0000 10/18/17  0000   CHOL 321* 272*   HDL 39* 39*   * 184   TRIG 209* 245*     Recent Labs   Lab 09/15/22  0547 09/15/22  0107 09/14/22  1436 09/14/22  0604 09/13/22  0553   WBC 11.7*  --   --  11.4* 13.9*   HGB 7.9*  7.9* 7.9* 7.4* 7.5* 9.0*   HCT 23.6*  --   --  23.0* 27.4*   MCV 87  --   --  92 89   *  --   --  134* 174     No results for input(s): PH, PHV, PO2, PO2V, SAT, PCO2, PCO2V, HCO3, HCO3V in the last 168 hours.  No results for input(s): NTBNPI, NTBNP in the last 168 hours.  No results for input(s): DD in the last 168 hours.  No results for input(s): SED, CRP in the last 168 hours.  Recent Labs   Lab 09/15/22  0547 09/14/22  0604 09/13/22  0553   * 134* 174     No results for input(s): TSH in the last 168 hours.  No results for input(s): COLOR, APPEARANCE, URINEGLC, URINEBILI, URINEKETONE, SG, UBLD, URINEPH, PROTEIN, UROBILINOGEN, NITRITE, LEUKEST, RBCU, WBCU in the last 168 hours.    Imaging:  Recent Results (from the past 48 hour(s))   CT Chest Pulmonary Embolism w Contrast    Narrative    CT CHEST PULMONARY EMBOLISM WITH CONTRAST 9/12/2022 1:44 PM    CLINICAL HISTORY: Postoperative chest pain  TECHNIQUE: CT angiogram chest during arterial phase injection IV  contrast. 2D and 3D MIP reconstructions were performed by the CT  technologist. Dose reduction techniques were used.     CONTRAST: 100 ml [id number]    COMPARISON: None.    FINDINGS:  ANGIOGRAM CHEST: Pulmonary arteries are normal caliber and negative  for pulmonary emboli. Thoracic aorta is negative for dissection. No CT  evidence of right heart strain.    LUNGS AND PLEURA: Groundglass densities are noted in the bilateral  lungs which could represent mild vascular congestion. No pneumothorax,  infiltrate or effusion is  identified. No significant nodule is seen.    MEDIASTINUM/AXILLAE: Heart is upper normal size. There are extensive  coronary artery calcifications and/or stents. There are calcifications  around the aortic valve annulus. There is ectasia/mild aneurysmal  dilatation of the aortic root at 4.4 cm in diameter. Ascending  thoracic aorta measures up to 3.9 cm in diameter and is mildly  ectatic. No mediastinal, hilar, or axillary lymphadenopathy is  identified. Visualized portions of the thyroid are unremarkable.    UPPER ABDOMEN: A few calcifications are seen in the inferior right  lobe of the liver likely represent chronic granulomatous disease or  prior injury. Tiny nonobstructive intrarenal calculi measure less than  0.3 cm bilaterally. Visualized portions of the upper abdominal  contents are otherwise grossly unremarkable. Evaluation of the solid  organs of the upper abdomen is limited due to lack of IV contrast.    MUSCULOSKELETAL: There are severe degenerative changes of the  bilateral shoulders. Degenerative changes of the spine are also noted.  No aggressive osseous lesions or acute osseous fractures are  identified. Ossified intra-articular free bodies are seen in the  bilateral shoulders, worse in the left shoulder.      Impression    IMPRESSION:  1.  No evidence for pulmonary artery embolism is identified.  2.  Mild groundglass densities in the lungs could represent vascular  congestion.  3.  Extensive coronary artery calcifications and/or stents. This could  be associated with the patient's recent symptoms.  4.  Severe degenerative changes of the bilateral shoulders.  Degenerative changes of the spine are also noted.   XR Pelvis Port 1/2 Views    Narrative    PELVIS PORTABLE ONE VIEW   9/12/2022 1:46 PM     HISTORY: Status post hip surgery    COMPARISON: Pelvis and left hip x-rays dated 8/4/2022.      Impression    IMPRESSION:  1. Single view of the lower pelvis was provided demonstrating new left  hip  arthroplasty. Hardware components are well seated without evidence  for hardware failure or periprosthetic fracture. Gas is seen in the  soft tissues around left hip, consistent with recent operative status.    2. Right hip arthroplasty components are again noted and demonstrate  no evidence for hardware failure or periprosthetic fracture.  3. Only a single view was obtained due to patient issues at the time  of the exam.       Echo:  No results found for this or any previous visit (from the past 4320 hour(s)).

## 2022-09-15 NOTE — PROGRESS NOTES
POD #3 phone rounds    Markie Rico is a 61 year old male  s/p Procedure(s):  right total hip arthroplasty at [facility]         He had chest pain, and had an MI, transferred to [facility]  Coronary Angiogram  Percutaneous Coronary Intervention -  on 2022  Had 1 unit packed red blood cells      Interim: Plavix discontinued due to possible bleed  It appears that he got another unit of blood yesterday.     SUBJECTIVE: his pain is manageable by report.  Feels that thigh is more swollen  Nurse reports that thigh is not tense.  Still unable to get up due to lightheadedness.     OBJECTIVE: early morning:BP 92/54 (BP Location: Left arm, Patient Position: Semi-Sunshine's, Cuff Size: Adult Regular)   Pulse 116   Temp 98.8  F (37.1  C)   Resp 16   Wt 123.9 kg (273 lb 3.2 oz)   SpO2 96%   BMI 39.20 kg/m     Mid morning: /57   Pulse 96   Temp 98.8  F (37.1  C)   Resp 16   Wt 123.9 kg (273 lb 3.2 oz)   SpO2 96%   BMI 39.20 kg/m     Temp (24hrs), Av.3  F (36.8  C), Min:97.8  F (36.6  C), Max:98.7  F (37.1  C)    Hemovac output:  NA        Recent Labs   Lab Test 09/15/22  0547 09/15/22  0107 22  1436 22  0604 22  0553   HGB 7.9*  7.9* 7.9* 7.4* 7.5* 9.0*   *  --   --  134* 174     Recent Labs   Lab Test 09/15/22  0547 22  0604 22  0553 22  1244   POTASSIUM 3.8 4.7 4.5  4.5 3.9   BUN  --  13 22  22 15   CR  --  0.67 0.82  0.79 0.47*            PT:  Bed exercises.           ASSESSMENT:  Status post left total hip arthroplasty, complicated by MI (his 4th).  Acute blood loss anemia.  His hgb still not coming up as expected. This went up only 0.5 with 1 unit and probably some Lasix.  His hgb was 14 preop, went down to 9 initially. I thought he also had some blood at [place] or around the PCI on ,  but I don't see documentation confirming that.  I still have concern that there could be an active bleed in the hip. He does have generalized swelling,  including upper extremities, so I do wonder if fluid retention could be causing a dilutional effect.   Blood loss could be the reason if his low bp and tachycardia continues..     PLAN:  Continue PHYSICAL THERAPY-- at bedside if needed  Check hgb again later today.     May need an angiogram and possibly involve IR for embolization,  if hgb continues to drop,. Suspect medial circumflex vessels if there is a bleed.    Discharge planned: open ended at this point. .     PEEP Briseno MD  Orthopedic Surgery       [facility]  P: [phone]

## 2022-09-15 NOTE — PROGRESS NOTES
[facility]    Medicine Progress Note - Hospitalist Service    Date of Admission:  9/12/2022    Assessment & Plan                        61 year old male with PMH significant for history of MI, HLD, morbid obesity, CAD, HTN, and OA of the bilateral hips who was emergently air lifted to the ED at [facility] on 9/12 after he underwent an elective total left hip replacement in [place] on 9/12/22 and in postoperative unit he reported chest pain, MI.  Work-up in the ED revealed anterior STEMI. Cardiology was consulted. The patient was emergently taken to the Cath lab for PCI of the LAD and further evaluation and management.   Patient is a direct admit           STEMI postoperatively after right hip total arthroplasty at [facility]  History of PCI to LAD and RCA; history of possible 3 stenting procedures at outside facility  Coronary Artery Disease Involving the Native Coronary Artery of Native Heart without Angina Pectoris  Ischemic Cardiomyopathy (LVEF 25- 35%) on echo 9/13/22  HTN  HLD  Direct admit after being found to have had a STEMI following left total hip surgery on 9/12. anterior STEMI with possible stent thrombosis.   Emergently flown to [facility] ED and taken to the cath lab for intervention.   -9/12: Emergent Cath Lab - noted to have chronic total occlusion LAD that was ballooned  Continue aspirin,lisinopril, metoprolol, atorvastatin  Cardiology consult appreciated  Telemetry monitor  Cardiac rehab  Elevated AST likely from MI monitor trend   Blood pressure is soft-adjust cardiac medications as per blood pressure  Plavix on hold since 9/114/22- as per cardiology/ortho due to concern of acute blood loss anemia postoperatively  Pulm edema with hypoxia- dose of iv lasix       Dilated aortic root   measuring 4.6 cm, ascending aorta measures 3.7 on ECHO  Need further workup        S/P Left MARIMAR at [facility]  on 9/12/2022  Post-op Blood Loss Anemia  Surgery for left MARIMAR in  [place] and immediately began to have chest pain in the PACU.  -Underwent Left MARIMAR on 9/12 at [place]. Spinal anesthesia with EBL of 650 mL.   Orthopedic consult appreciated  Continue pain medication as needed oxycodone with senna, Tylenol  Postop cares per surgery  Postoperative anticoagulation-- finalized between cardiology and orthopedic team         Acute postoperative blood loss anemia, Thromobocytopenia  Got blood transfusion earlier  Given drop in hemoglobin -  holding Plavix short-term since 9/14/22.  Plan to keep hemoglobin 8 or above as per cardiology.    Given blood transfusion 9/14 for hemoglobin of 7.4-however after giving blood transfusion hemoglobin is still 7.6 and there is swelling in left thigh area along soft Blood pressure, increased heart rate there is a concern that there could be bleeding-discussed with orthopedics and then IR team and planning to get CT angiogram stat and based on that if there is any bleeding will need embolization.             Elevated sugars  Check A1c       Hypertension continue home medication           Postop elevated leukocytosis likely reactive  Monitor closely           Elevated AST improving significantly  likely from MI   monitor trend              Diet: Low Saturated Fat Na <2400 mg    DVT Prophylaxis: Pneumatic Compression Devices  Caro Catheter: Not present  Central Lines: None  Cardiac Monitoring: ACTIVE order. Indication: AMI (NSTEMI/ STEMI) (48 hours)  Code Status: Full Code      Disposition Plan      Expected Discharge Date: 09/18/2022      Destination: home with family          The patient's care was discussed with the Patient, nurse, orthopedics and IR.    IGOR BERG MD  Hospitalist Service  [facility]  Securely message with the Vocera Web Console (learn more here)  Text page via [employer] Paging/Directory         Clinically Significant Risk Factors Present on Admission                      ______________________________________________________________________      Interval History     Patient is in room  No major overnight event  No chest pain no worsening shortness of breath no dizziness  No abdominal pain  No new complains        Data reviewed today: I personally reviewed all medications, new labs and imaging results over the last 24 hours on epic.     Physical Exam   Vital Signs: Temp: 98.8  F (37.1  C) Temp src: Oral BP: 102/57 Pulse: 96   Resp: 16 SpO2: 96 % O2 Device: None (Room air)    Weight: 273 lbs 3.2 oz    General: Lying comfortably, not in distress  HEENT: no icterus, no obvious masses, normal conjunctiva, MM moist  No JVD  CVS: RRR, no gallops/murmur  Pulm: AEBE clear  Abd: Soft, nontender, not distended  Psych- normal mood  Neuro: Alert oriented times 3, moving all 4 limbs.  Ext: Have edema in left thigh area

## 2022-09-16 ENCOUNTER — APPOINTMENT (OUTPATIENT)
Dept: PHYSICAL THERAPY | Facility: CLINIC | Age: 62
DRG: 250 | End: 2022-09-16
Payer: COMMERCIAL

## 2022-09-16 LAB
ALBUMIN SERPL-MCNC: 2.4 G/DL (ref 3.4–5)
ALP SERPL-CCNC: 62 U/L (ref 40–150)
ALT SERPL W P-5'-P-CCNC: 55 U/L (ref 0–70)
ANION GAP SERPL CALCULATED.3IONS-SCNC: 6 MMOL/L (ref 3–14)
AST SERPL W P-5'-P-CCNC: 62 U/L (ref 0–45)
BILIRUB SERPL-MCNC: 0.8 MG/DL (ref 0.2–1.3)
BLD PROD TYP BPU: NORMAL
BLOOD COMPONENT TYPE: NORMAL
BUN SERPL-MCNC: 13 MG/DL (ref 7–30)
CALCIUM SERPL-MCNC: 8.2 MG/DL (ref 8.5–10.1)
CHLORIDE BLD-SCNC: 106 MMOL/L (ref 94–109)
CO2 SERPL-SCNC: 25 MMOL/L (ref 20–32)
CODING SYSTEM: NORMAL
CREAT SERPL-MCNC: 0.61 MG/DL (ref 0.66–1.25)
CROSSMATCH: NORMAL
ERYTHROCYTE [DISTWIDTH] IN BLOOD BY AUTOMATED COUNT: 15.2 % (ref 10–15)
GFR SERPL CREATININE-BSD FRML MDRD: >90 ML/MIN/1.73M2
GLUCOSE BLD-MCNC: 124 MG/DL (ref 70–99)
HCT VFR BLD AUTO: 27.8 % (ref 40–53)
HGB BLD-MCNC: 8 G/DL (ref 13.3–17.7)
HGB BLD-MCNC: 8.4 G/DL (ref 13.3–17.7)
HGB BLD-MCNC: 8.9 G/DL (ref 13.3–17.7)
HGB BLD-MCNC: 8.9 G/DL (ref 13.3–17.7)
ISSUE DATE AND TIME: NORMAL
MAGNESIUM SERPL-MCNC: 2.7 MG/DL (ref 1.6–2.3)
MCH RBC QN AUTO: 28.1 PG (ref 26.5–33)
MCHC RBC AUTO-ENTMCNC: 32 G/DL (ref 31.5–36.5)
MCV RBC AUTO: 88 FL (ref 78–100)
PATH REPORT.COMMENTS IMP SPEC: NORMAL
PATH REPORT.COMMENTS IMP SPEC: NORMAL
PATH REPORT.FINAL DX SPEC: NORMAL
PATH REPORT.GROSS SPEC: NORMAL
PATH REPORT.MICROSCOPIC SPEC OTHER STN: NORMAL
PATH REPORT.RELEVANT HX SPEC: NORMAL
PHOTO IMAGE: NORMAL
PLATELET # BLD AUTO: 179 10E3/UL (ref 150–450)
POTASSIUM BLD-SCNC: 4 MMOL/L (ref 3.4–5.3)
PROT SERPL-MCNC: 5.9 G/DL (ref 6.8–8.8)
RBC # BLD AUTO: 3.17 10E6/UL (ref 4.4–5.9)
SODIUM SERPL-SCNC: 137 MMOL/L (ref 133–144)
TROPONIN I SERPL HS-MCNC: ABNORMAL NG/L
UNIT ABO/RH: NORMAL
UNIT NUMBER: NORMAL
UNIT STATUS: NORMAL
UNIT TYPE ISBT: 9500
WBC # BLD AUTO: 11.9 10E3/UL (ref 4–11)

## 2022-09-16 PROCEDURE — 97110 THERAPEUTIC EXERCISES: CPT | Mod: GP

## 2022-09-16 PROCEDURE — 250N000013 HC RX MED GY IP 250 OP 250 PS 637: Performed by: INTERNAL MEDICINE

## 2022-09-16 PROCEDURE — 97530 THERAPEUTIC ACTIVITIES: CPT | Mod: GP

## 2022-09-16 PROCEDURE — 99232 SBSQ HOSP IP/OBS MODERATE 35: CPT | Performed by: INTERNAL MEDICINE

## 2022-09-16 PROCEDURE — 80053 COMPREHEN METABOLIC PANEL: CPT | Performed by: INTERNAL MEDICINE

## 2022-09-16 PROCEDURE — 36415 COLL VENOUS BLD VENIPUNCTURE: CPT | Performed by: INTERNAL MEDICINE

## 2022-09-16 PROCEDURE — 250N000013 HC RX MED GY IP 250 OP 250 PS 637: Performed by: HOSPITALIST

## 2022-09-16 PROCEDURE — 85018 HEMOGLOBIN: CPT | Performed by: INTERNAL MEDICINE

## 2022-09-16 PROCEDURE — 97116 GAIT TRAINING THERAPY: CPT | Mod: GP

## 2022-09-16 PROCEDURE — P9016 RBC LEUKOCYTES REDUCED: HCPCS | Performed by: INTERNAL MEDICINE

## 2022-09-16 PROCEDURE — 99207 PR SC NO CHARGE VISIT: CPT | Performed by: INTERNAL MEDICINE

## 2022-09-16 PROCEDURE — 83735 ASSAY OF MAGNESIUM: CPT | Performed by: INTERNAL MEDICINE

## 2022-09-16 PROCEDURE — 99233 SBSQ HOSP IP/OBS HIGH 50: CPT | Performed by: HOSPITALIST

## 2022-09-16 PROCEDURE — 210N000002 HC R&B HEART CARE

## 2022-09-16 PROCEDURE — 85027 COMPLETE CBC AUTOMATED: CPT | Performed by: INTERNAL MEDICINE

## 2022-09-16 PROCEDURE — 84484 ASSAY OF TROPONIN QUANT: CPT | Performed by: HOSPITALIST

## 2022-09-16 PROCEDURE — 250N000013 HC RX MED GY IP 250 OP 250 PS 637: Performed by: NURSE PRACTITIONER

## 2022-09-16 RX ORDER — BUPROPION HYDROCHLORIDE 150 MG/1
150 TABLET, EXTENDED RELEASE ORAL DAILY
Status: DISCONTINUED | OUTPATIENT
Start: 2022-09-16 | End: 2022-09-20 | Stop reason: HOSPADM

## 2022-09-16 RX ADMIN — SENNOSIDES AND DOCUSATE SODIUM 1 TABLET: 50; 8.6 TABLET ORAL at 21:10

## 2022-09-16 RX ADMIN — METHOCARBAMOL 500 MG: 500 TABLET ORAL at 05:48

## 2022-09-16 RX ADMIN — BUPROPION HYDROCHLORIDE 150 MG: 150 TABLET, EXTENDED RELEASE ORAL at 10:27

## 2022-09-16 RX ADMIN — OXYCODONE HYDROCHLORIDE 10 MG: 5 TABLET ORAL at 23:15

## 2022-09-16 RX ADMIN — ATORVASTATIN CALCIUM 80 MG: 80 TABLET, FILM COATED ORAL at 08:21

## 2022-09-16 RX ADMIN — METHOCARBAMOL 500 MG: 500 TABLET ORAL at 16:34

## 2022-09-16 RX ADMIN — ASPIRIN 81 MG: 81 TABLET, COATED ORAL at 08:22

## 2022-09-16 RX ADMIN — OXYCODONE HYDROCHLORIDE 5 MG: 5 TABLET ORAL at 01:33

## 2022-09-16 RX ADMIN — METHOCARBAMOL 500 MG: 500 TABLET ORAL at 23:15

## 2022-09-16 RX ADMIN — SENNOSIDES AND DOCUSATE SODIUM 1 TABLET: 50; 8.6 TABLET ORAL at 08:22

## 2022-09-16 RX ADMIN — OXYCODONE HYDROCHLORIDE 10 MG: 5 TABLET ORAL at 05:48

## 2022-09-16 RX ADMIN — PANTOPRAZOLE SODIUM 40 MG: 40 TABLET, DELAYED RELEASE ORAL at 08:22

## 2022-09-16 RX ADMIN — OXYCODONE HYDROCHLORIDE 10 MG: 5 TABLET ORAL at 10:19

## 2022-09-16 RX ADMIN — METOPROLOL SUCCINATE 12.5 MG: 25 TABLET, EXTENDED RELEASE ORAL at 08:21

## 2022-09-16 RX ADMIN — OXYCODONE HYDROCHLORIDE 10 MG: 5 TABLET ORAL at 16:34

## 2022-09-16 RX ADMIN — LISINOPRIL 5 MG: 5 TABLET ORAL at 08:21

## 2022-09-16 ASSESSMENT — ACTIVITIES OF DAILY LIVING (ADL)
ADLS_ACUITY_SCORE: 50
ADLS_ACUITY_SCORE: 49
ADLS_ACUITY_SCORE: 49
ADLS_ACUITY_SCORE: 47
ADLS_ACUITY_SCORE: 46
ADLS_ACUITY_SCORE: 47
ADLS_ACUITY_SCORE: 50
ADLS_ACUITY_SCORE: 49
ADLS_ACUITY_SCORE: 46
ADLS_ACUITY_SCORE: 47

## 2022-09-16 NOTE — PROGRESS NOTES
[facility]  Cardiology Progress Note    Date of Service (when I saw the patient): 09/16/2022       Assessment & Plan   Markie Rico is a 61 year old male who was admitted on 9/12/2022.     1.  : STEMI post operatively after right total hip arthroplasty (9/12/2022)   - Troponin > 125,000  - s/p angiogram with balloon angioplasty of the previous ostial to mid LAD stent Lcx and OM1 disease. H/o remote PCI to LAD and RCA.  Recommended for consideration of possible CABG given MVD and multiple stent closure of the LAD.   - Plavix held d/t anemia. 8.9 today.   - Aspirin alone.     2.  : Ischemic Cardiomyopathy   - Echocardiogram showed severely reduced EF 25-30%, mid to distal anterior, anteroseptal hypokinesis, akinetic apex. No significant valvular disease.     3. Hypertension  - Borderline soft.   - Low dose lisinopril and metoprolol   4. Hyperlipidemia  - High potency statin   5. Anemia - Hgb 8.9 s/p 1 unit 9/15  - Abdominal CT showed no evidence of bleeding or hematoma     6. Aortic Root dilation - 4.6 cm  7. Ascending aortic dilation - 3.7 cm   8. Obesity     Plan  1. Chest pain resolved. Tele confirms NSR with T-wave inversion.   2. Anemia stable 8.9 s/p transfusion. Plavix stopped. Continue aspirin alone.   3. Severely reduced EF 25-30%. Continue with GDMT, lisinopril and metoprolol. Uptitrate as BP allows.   4. Continue high potency statin   5. [facility] possible need [facility]G.       I spent > 20 minutes face-to-face and/or coordinating care. Over 50% of our time on the unit was spent counseling the patient and/or coordinating care     ANY Villa CNP  Text Page  (M-F, 7:30 am - 4:00 pm)    Interval History   No cardiac complaint, denies CP, shortness of breath, palpitations, PND, and no LE edema. Hgb 8.9 s/p transfusion on aspirin alone. BP soft on low dose ACE and BB.     Physical Exam   Temp: 98.8  F (37.1  C) Temp src: Axillary BP:  101/63 Pulse: 120   Resp: 16 SpO2: 95 % O2 Device: None (Room air)    Vitals:    09/13/22 0543 09/14/22 0627   Weight: 123.4 kg (272 lb) 123.9 kg (273 lb 3.2 oz)     Vital Signs with Ranges  Temp:  [98.7  F (37.1  C)-99  F (37.2  C)] 98.8  F (37.1  C)  Pulse:  [111-120] 120  Resp:  [14-16] 16  BP: ()/(47-69) 101/63  SpO2:  [94 %-98 %] 95 %  I/O last 3 completed shifts:  In: 250   Out: 2095 [Urine:2095]    GEN:  In general, this is a morbidly obese male in no acute distress.  Patient ambulatory.  HEENT:  Pupils equal, round. Sclerae nonicteric. Clear oropharynx. Mucous membranes moist.  NECK: Supple, no masses appreciated. Trachea midline. Difficult to assess JVD due to body habitus.   C/V:  Regular rate and rhythm, no murmur, rub or gallop.   RESP: Respirations are unlabored. No use of accessory muscles. Clear to auscultation bilaterally without wheezing, rales, or rhonchi.  GI: Obese Abdomen soft, nontender, nondistended. .   EXTREM: No LE edema. No cyanosis or clubbing.  NEURO: Alert and oriented, cooperative.  No obvious focal deficits.   PSYCH: Normal affect.  SKIN: Warm and dry. No rashes or petechiae appreciated.       Medications     Continuing ACE inhibitor/ARB/ARNI from home medication list OR ACE inhibitor/ARB order already placed during this visit       - MEDICATION INSTRUCTIONS -       - MEDICATION INSTRUCTIONS -       - MEDICATION INSTRUCTIONS -       - MEDICATION INSTRUCTIONS -       Percutaneous Coronary Intervention orders placed (this is information for BPA alerting)         aspirin  81 mg Oral Daily     atorvastatin  80 mg Oral Daily     buPROPion  150 mg Oral Daily     furosemide  10 mg Intravenous Once     lisinopril  5 mg Oral Daily     metoprolol succinate ER  12.5 mg Oral Daily     pantoprazole  40 mg Oral QAM AC     senna-docusate  1-2 tablet Oral BID     sodium chloride (PF)  3 mL Intracatheter Q8H       Data   Reviewed       Vikki Lynne, ANY CNP 9/16/2022

## 2022-09-16 NOTE — PROGRESS NOTES
ORTHO DAILY PROGRESS NOTE     POD #4   Markie Rico is a 61 year old male  s/p Procedure(s):  right total hip arthroplasty at [facility]       He had chest pain, and had an MI, transferred to [facility]  Coronary Angiogram  Percutaneous Coronary Intervention -  on 9/12/2022  Had 1 unit packed red blood cells      Interim: had CT-Angio which showed no active arterial bleed, no hematoma.  On only ASA now for anticoagulation.  Had another unit of blood last night.  He got up and walked around the room this morning, but was exhausted after doing so.     SUBJECTIVE:  Complaints: no chest pain.  Left hip less painful. He reports that his preop pain is gone.  Nausea: none     OBJECTIVE:  ..BP 90/61   Pulse 114   Temp 98.8  F (37.1  C) (Axillary)   Resp 14   Wt 123.6 kg (272 lb 6.4 oz)   SpO2 95%   BMI 39.09 kg/m        Hemovac output:  NA     General: in no apparent distress,  Awake, alert     Incision is covered, moderate thigh swelling. No apparent blistering of skin.  CMSI   Toes warm and well perfused  Calves soft and nontender to squeeze.          Recent Labs   Lab Test 09/16/22  0558 09/15/22  2339 09/15/22  1210 09/15/22  0547 09/14/22  1436 09/14/22  0604   HGB 8.9*  8.9* 8.2* 7.6* 7.9*  7.9*   < > 7.5*     --   --  130*  --  134*    < > = values in this interval not displayed.     Recent Labs   Lab Test 09/16/22  0558 09/15/22  0547 09/14/22  0604   POTASSIUM 4.0 3.8 4.7   BUN 13 11 13   CR 0.61* 0.55* 0.67     Recent Labs   Lab Test 04/14/18  1444   INR 0.97         PT:  Is seeing him daily, and he's working on bed exercises.           ASSESSMENT:  Status post total hip arthroplasty doing better.  MI and CHF slowing him down at this point. Hypotension and tachycardia continues..    PLAN:  Continue PHYSICAL THERAPY  Monitor hgb daily now.  Discharge planned: when cleared. I suspect he may need to go to a rehab center upon discharge, physical therapy's input on this would be appreciated.   Avoid  narcotics as much as possible to help keep blood pressure up.    PEPE Briseno MD  Orthopedic Surgery   [facility]  P: [phone]

## 2022-09-16 NOTE — PROGRESS NOTES
"  Interventional Radiology - Progress Note  Inpatient - [facility]  9/16/2022    IR Brief Note    IR consulted for evaluation for possible postoperative bleeding. STAT CTA ordered, reviewed yesterday by Dr Gonzales. Findings:    \"IMPRESSION:  1.  No identified area of arterial extravasation or hematoma.  2.  Additional incidental findings as above.\"    No IR procedure recommended. Thank you for allowing us to participate in this patient's care. IR will no longer follow. Please contact our service with any questions, concerns, or requests for intervention.    Griffin Rajan PA-C  Interventional Radiology  [phone] (IR)  *[phone] ([facility] Office)    "

## 2022-09-16 NOTE — PROGRESS NOTES
[facility]  Hospitalist Progress Note   09/16/2022          Assessment and Plan:       Markie Rico is a 61 year old male with coronary artery disease, hypertension, hyperlipidemia, medically complicated obesity, osteoarthritis of bilateral hips emergently air lifted to the ED at [facility] on 9/12. Underwent elective total left hip replacement in [place] on 9/12/22, chest pain in the immediate postoperative period.     STEMI postoperatively after right hip total arthroplasty at [facility][note 3].  Ischemic Cardiomyopathy (LVEF 25- 35%) on echo 9/13/22  History of PCI to LAD and RCA remotely.  Coronary artery disease.  HTN, HLD  Work-up in the ED revealed anterior STEMI.  Troponin peaked to > 125k  Underwent emergent balloon angioplasty of his LAD on 9/12.  Echocardiogram with reduced EF of 25 to 30%.  Mid to distal anterior, anteroseptal hypokinesis.  No significant valve disease  Cardiology following, appreciate input.  Continue aspirin monotherapy per cardiology.  Plavix held due to anemia.  Continue low-dose lisinopril, metoprolol.  Noted soft blood pressures.  Received IV Lasix on 9/15.  As needed Lasix  Continue Lipitor.  LDL 54, HDL 48.  Total cholesterol 119.  Continue telemetry monitoring.  Continue cardiac rehabilitation.  [facility] for [facility] of possible need for [facility]G.     S/P Left MARIMAR at [facility][note 3]  on 9/12/2022  Underwent left MARIMAR on 9/12 at [place] and immediately began to have chest pain in the PACU.  Spinal anesthesia with EBL of 650 mL.   Orthopedic surgery following.  Appreciate input.  Continue pain medication as needed oxycodone with senna, Tylenol  Defer pharmacological DVT prophylaxis to orthopedic team, cardiology     Acute anemia likely from blood loss, dilutional.  Thrombocytopenia improved.  In the immediate postoperative period hemoglobin around 11.  Post angiogram hemoglobin dropped to 7.4 on 9/14.  Noted to have left thigh swelling.   CTA No identified area of arterial extravasation or hematoma.  Interventional radiology followed-no IR procedure recommended.    Received 1 unit blood transfusion, hemoglobin greater than 8.  Monitor daily.    Transaminitis likely due to ischemia.  AST peaked to 593, trending down  Avoid hepatotoxic drugs.  Monitor.     Postop elevated leukocytosis likely reactive  Trend WBC count, fever curve.  Ordered urine analysis.  If spikes fever will consider chest x-ray, blood cultures    Dilated aortic root   Incidental finding on echocardiogram.  Optimize blood pressures, follow-up as outpatient.    Hypoalbuminemia likely dilutional.  Serum albumin 2.4.  Monitor in AM.    Hyperglycemia.  Hemoglobin A1c 5.5.  Risk factor modification.    Medically complicated obesity with a BMI of 39.09.  Consider lifestyle modification with diet and exercise as able to.  Consider sleep studies as outpatient.     Orders Placed This Encounter      Low Saturated Fat Na <2400 mg      DVT Prophylaxis: SCDs, ambulate.  Code Status: Full Code  Disposition: Expected discharge in 2 days pending clinical improvement.    Discussed with patient, his wife over the telephone, bedside RN  Total time greater than 35 minutes.  More than 50% of time spent in direct patient care, care coordination, patient/caregiver counseling, and formalizing plan of care.     Андрей Bello MD        Interval History:        Patient lying in bed.  No chest pain or palpitations.  Denies any shortness of breath.  No nausea vomiting.  No Abdominal pain.  Afebrile.  Received a blood transfusion and hemoglobin improved to 8.9.  Systolic blood pressures in 100s.         Physical Exam:        Physical Exam   Temp:  [98.7  F (37.1  C)-99  F (37.2  C)] 98.8  F (37.1  C)  Pulse:  [111-120] 120  Resp:  [14-16] 16  BP: ()/(47-69) 101/63  SpO2:  [94 %-98 %] 95 %    Intake/Output Summary (Last 24 hours) at 9/16/2022 0921  Last data filed at 9/16/2022 0734  Gross per 24 hour    Intake 250 ml   Output 2045 ml   Net -1795 ml       Admission Weight: 123.4 kg (272 lb)  Current Weight: 123.9 kg (273 lb 3.2 oz)    PHYSICAL EXAM  GENERAL: Patient is in no distress. Alert and oriented.  HEART: Regular rate and rhythm. S1S2. No murmurs  LUNGS: Bilateral clear breath sounds.  No wheezing or crackles.  Respirations unlabored  Abdomen soft nontender bowel sounds heard.  NEURO: Moving all extremities, no focal weakness  EXTREMITIES: No pedal edema.   SKIN: Warm, dry. No rash   PSYCHIATRY Cooperative       Medications:          aspirin  81 mg Oral Daily     atorvastatin  80 mg Oral Daily     furosemide  10 mg Intravenous Once     lisinopril  5 mg Oral Daily     metoprolol succinate ER  12.5 mg Oral Daily     pantoprazole  40 mg Oral QAM AC     senna-docusate  1-2 tablet Oral BID     sodium chloride (PF)  3 mL Intracatheter Q8H     acetaminophen, calcium carbonate, Continuing ACE inhibitor/ARB/ARNI from home medication list OR ACE inhibitor/ARB order already placed during this visit, - MEDICATION INSTRUCTIONS -, - MEDICATION INSTRUCTIONS -, - MEDICATION INSTRUCTIONS -, hydrALAZINE, lidocaine 4%, lidocaine (buffered or not buffered), - MEDICATION INSTRUCTIONS -, methocarbamol, metoprolol, morphine, naloxone **OR** naloxone **OR** naloxone **OR** naloxone, nitroGLYcerin, ondansetron **OR** ondansetron, oxyCODONE **OR** oxyCODONE, Percutaneous Coronary Intervention orders placed (this is information for BPA alerting), sodium chloride (PF)         Data:      All new lab and imaging data was reviewed.

## 2022-09-16 NOTE — PLAN OF CARE
Care plan note:      Recent Vitals:  Temp: 98.7  F (37.1  C) Temp src: Oral BP: 90/58 Pulse: 111   Resp: 16 SpO2: 95 % O2 Device: None (Room air)      Orientation/Neuro: Alert and Oriented x 4  Pain: Pain is controlled with current analgesics.  Medication(s) being used: narcotic analgesics including oxycodone (Oxycontin, Oxyir)   Tele: Sinus tachycardia   IV medications: None   Mobility: Assist of 2   Skin: Groin site: R groin site CDI   GI: constipation and Audible bowel sounds  : WDL     Diet: Tolerating diet:   Well  Orders Placed This Encounter      Low Saturated Fat Na <2400 mg      Safety/Concerns:  Fall Risk  Aggression Color: Green    Plan: Patient has been resting well tonight. BP has been soft running  systolic, HR has been tachycardic (110's), other VSS. Patient denies chest pain and SOB. Pt hgb was 7.6, 1 unit of blood transfused. Hgb then 8.2- no transfusion reactions. Orders to transfuse <8. Ordered lasix after transfusion was not given due to low BP. Patient gets Q4 oxy and Robaxin for pain. Plan to continue monitoring Hgb and VS.    Continue to monitor.      Maye Montgomery RN

## 2022-09-16 NOTE — PLAN OF CARE
A&Ox4, BP 100s sys, HR 110s. No CP or SOB. Steps with PT, out of breath by end. Oxy x1. No pain after intervention. Hgb is improving and no other signs of anemia at this time. Bowel movement x2. Plan is [facility] for evaluation of possible need for CABG.

## 2022-09-16 NOTE — PROGRESS NOTES
Writer contacted [facility] and spoke with Olinda in scheduling regarding referral for CV surgery to [facility].  Writer faxed H/P, chart notes and order for CV surgery referral [facility]:  [phone].   Patient's PCP to arrange follow up with CV surgery.  Writer will fax discharge orders to above number once patient is discharged and arrange follow up with PCP.    Jackie Ovalles RN  Care Coordinator  [facility]  [phone] (text or call)

## 2022-09-17 ENCOUNTER — APPOINTMENT (OUTPATIENT)
Dept: PHYSICAL THERAPY | Facility: CLINIC | Age: 62
DRG: 250 | End: 2022-09-17
Payer: COMMERCIAL

## 2022-09-17 ENCOUNTER — APPOINTMENT (OUTPATIENT)
Dept: OCCUPATIONAL THERAPY | Facility: CLINIC | Age: 62
DRG: 250 | End: 2022-09-17
Attending: HOSPITALIST
Payer: COMMERCIAL

## 2022-09-17 LAB
ANION GAP SERPL CALCULATED.3IONS-SCNC: 5 MMOL/L (ref 3–14)
BUN SERPL-MCNC: 14 MG/DL (ref 7–30)
CALCIUM SERPL-MCNC: 8.1 MG/DL (ref 8.5–10.1)
CHLORIDE BLD-SCNC: 108 MMOL/L (ref 94–109)
CO2 SERPL-SCNC: 24 MMOL/L (ref 20–32)
CREAT SERPL-MCNC: 0.6 MG/DL (ref 0.66–1.25)
ERYTHROCYTE [DISTWIDTH] IN BLOOD BY AUTOMATED COUNT: 14.6 % (ref 10–15)
GFR SERPL CREATININE-BSD FRML MDRD: >90 ML/MIN/1.73M2
GLUCOSE BLD-MCNC: 125 MG/DL (ref 70–99)
HCT VFR BLD AUTO: 27.9 % (ref 40–53)
HGB BLD-MCNC: 8.9 G/DL (ref 13.3–17.7)
HGB BLD-MCNC: 8.9 G/DL (ref 13.3–17.7)
MAGNESIUM SERPL-MCNC: 2.3 MG/DL (ref 1.6–2.3)
MCH RBC QN AUTO: 28.7 PG (ref 26.5–33)
MCHC RBC AUTO-ENTMCNC: 31.9 G/DL (ref 31.5–36.5)
MCV RBC AUTO: 90 FL (ref 78–100)
PLATELET # BLD AUTO: 204 10E3/UL (ref 150–450)
POTASSIUM BLD-SCNC: 3.7 MMOL/L (ref 3.4–5.3)
RBC # BLD AUTO: 3.1 10E6/UL (ref 4.4–5.9)
SODIUM SERPL-SCNC: 137 MMOL/L (ref 133–144)
WBC # BLD AUTO: 9.3 10E3/UL (ref 4–11)

## 2022-09-17 PROCEDURE — 83735 ASSAY OF MAGNESIUM: CPT | Performed by: HOSPITALIST

## 2022-09-17 PROCEDURE — 250N000013 HC RX MED GY IP 250 OP 250 PS 637: Performed by: HOSPITALIST

## 2022-09-17 PROCEDURE — 97530 THERAPEUTIC ACTIVITIES: CPT | Mod: GP

## 2022-09-17 PROCEDURE — 97535 SELF CARE MNGMENT TRAINING: CPT | Mod: GO | Performed by: OCCUPATIONAL THERAPIST

## 2022-09-17 PROCEDURE — 210N000002 HC R&B HEART CARE

## 2022-09-17 PROCEDURE — 97165 OT EVAL LOW COMPLEX 30 MIN: CPT | Mod: GO | Performed by: OCCUPATIONAL THERAPIST

## 2022-09-17 PROCEDURE — 99232 SBSQ HOSP IP/OBS MODERATE 35: CPT | Performed by: HOSPITALIST

## 2022-09-17 PROCEDURE — 85027 COMPLETE CBC AUTOMATED: CPT | Performed by: INTERNAL MEDICINE

## 2022-09-17 PROCEDURE — 250N000013 HC RX MED GY IP 250 OP 250 PS 637: Performed by: NURSE PRACTITIONER

## 2022-09-17 PROCEDURE — 99231 SBSQ HOSP IP/OBS SF/LOW 25: CPT | Performed by: INTERNAL MEDICINE

## 2022-09-17 PROCEDURE — 80048 BASIC METABOLIC PNL TOTAL CA: CPT | Performed by: INTERNAL MEDICINE

## 2022-09-17 PROCEDURE — 36415 COLL VENOUS BLD VENIPUNCTURE: CPT | Performed by: INTERNAL MEDICINE

## 2022-09-17 PROCEDURE — 250N000013 HC RX MED GY IP 250 OP 250 PS 637: Performed by: INTERNAL MEDICINE

## 2022-09-17 PROCEDURE — 97116 GAIT TRAINING THERAPY: CPT | Mod: GP

## 2022-09-17 RX ORDER — NALOXONE HYDROCHLORIDE 0.4 MG/ML
0.4 INJECTION, SOLUTION INTRAMUSCULAR; INTRAVENOUS; SUBCUTANEOUS
Status: DISCONTINUED | OUTPATIENT
Start: 2022-09-17 | End: 2022-09-20 | Stop reason: HOSPADM

## 2022-09-17 RX ORDER — NALOXONE HYDROCHLORIDE 0.4 MG/ML
0.2 INJECTION, SOLUTION INTRAMUSCULAR; INTRAVENOUS; SUBCUTANEOUS
Status: DISCONTINUED | OUTPATIENT
Start: 2022-09-17 | End: 2022-09-20 | Stop reason: HOSPADM

## 2022-09-17 RX ORDER — LISINOPRIL 5 MG/1
5 TABLET ORAL
Status: DISCONTINUED | OUTPATIENT
Start: 2022-09-18 | End: 2022-09-20

## 2022-09-17 RX ORDER — ATORVASTATIN CALCIUM 20 MG/1
20 TABLET, FILM COATED ORAL DAILY
Status: DISCONTINUED | OUTPATIENT
Start: 2022-09-18 | End: 2022-09-20 | Stop reason: HOSPADM

## 2022-09-17 RX ADMIN — OXYCODONE HYDROCHLORIDE 5 MG: 5 TABLET ORAL at 22:34

## 2022-09-17 RX ADMIN — ACETAMINOPHEN 650 MG: 325 TABLET ORAL at 17:56

## 2022-09-17 RX ADMIN — SENNOSIDES AND DOCUSATE SODIUM 1 TABLET: 50; 8.6 TABLET ORAL at 09:18

## 2022-09-17 RX ADMIN — ACETAMINOPHEN 650 MG: 325 TABLET ORAL at 09:17

## 2022-09-17 RX ADMIN — LISINOPRIL 5 MG: 5 TABLET ORAL at 09:18

## 2022-09-17 RX ADMIN — ACETAMINOPHEN 650 MG: 325 TABLET ORAL at 22:34

## 2022-09-17 RX ADMIN — METHOCARBAMOL 500 MG: 500 TABLET ORAL at 09:17

## 2022-09-17 RX ADMIN — PANTOPRAZOLE SODIUM 40 MG: 40 TABLET, DELAYED RELEASE ORAL at 09:18

## 2022-09-17 RX ADMIN — BUPROPION HYDROCHLORIDE 150 MG: 150 TABLET, EXTENDED RELEASE ORAL at 09:17

## 2022-09-17 RX ADMIN — METHOCARBAMOL 500 MG: 500 TABLET ORAL at 14:32

## 2022-09-17 RX ADMIN — OXYCODONE HYDROCHLORIDE 5 MG: 5 TABLET ORAL at 14:32

## 2022-09-17 RX ADMIN — ASPIRIN 81 MG: 81 TABLET, COATED ORAL at 09:18

## 2022-09-17 RX ADMIN — METOPROLOL SUCCINATE 12.5 MG: 25 TABLET, EXTENDED RELEASE ORAL at 09:18

## 2022-09-17 RX ADMIN — METHOCARBAMOL 500 MG: 500 TABLET ORAL at 22:34

## 2022-09-17 RX ADMIN — OXYCODONE HYDROCHLORIDE 5 MG: 5 TABLET ORAL at 18:28

## 2022-09-17 RX ADMIN — ATORVASTATIN CALCIUM 80 MG: 80 TABLET, FILM COATED ORAL at 09:18

## 2022-09-17 RX ADMIN — OXYCODONE HYDROCHLORIDE 10 MG: 5 TABLET ORAL at 09:18

## 2022-09-17 RX ADMIN — SENNOSIDES AND DOCUSATE SODIUM 2 TABLET: 50; 8.6 TABLET ORAL at 22:33

## 2022-09-17 RX ADMIN — OXYCODONE HYDROCHLORIDE 10 MG: 5 TABLET ORAL at 04:57

## 2022-09-17 ASSESSMENT — ACTIVITIES OF DAILY LIVING (ADL)
PREVIOUS_RESPONSIBILITIES: WORK;DRIVING;FINANCES;MEDICATION MANAGEMENT
ADLS_ACUITY_SCORE: 44
ADLS_ACUITY_SCORE: 46
ADLS_ACUITY_SCORE: 46
ADLS_ACUITY_SCORE: 44

## 2022-09-17 NOTE — PROGRESS NOTES
[facility]  Cardiology Progress Note    Date of Service (when I saw the patient): 09/17/2022       Assessment & Plan   Markie Rico is a 61 year old male who was admitted on 9/12/2022.     1. STEMI post operatively after right total hip arthroplasty (9/12/2022)   - Troponin > 125,000, trending down to 18,797  - s/p angiogram with balloon angioplasty of the previous ostial to mid LAD stent Lcx and OM1 disease. H/o remote PCI to LAD and RCA.  Recommended for consideration of possible CABG given MVD and multiple stent closure of the LAD.   - Plavix held d/t anemia. 8.9 today, stable.   - Aspirin alone.     2. Ischemic Cardiomyopathy   - Echocardiogram showed severely reduced EF 25-30%, mid to distal anterior, anteroseptal hypokinesis, akinetic apex. No significant valvular disease.     3. Hypertension  - Borderline soft.   - Low dose lisinopril and metoprolol     4. Hyperlipidemia  - High potency statin     5. Anemia - Hgb 8.9 s/p 1 unit 9/15  - Abdominal CT showed no evidence of bleeding or hematoma     6. Aortic Root dilation - 4.6 cm  7. Ascending aortic dilation - 3.7 cm   8. Obesity     Plan  1. Chest pain resolved. Tele confirms NSR with T-wave inversion.   2. Anemia stable 8.9 s/p transfusion. Plavix stopped. Continue aspirin alone.   3. Severely reduced EF 25-30%. Continue with GDMT, lisinopril and metoprolol. Uptitrate as BP allows.   4. Continue high potency statin   5. [facility] of possible need for [facility]G.     Interval History   No cardiac complaint, denies CP, shortness of breath, palpitations, PND, and no LE edema. Hgb 8.9 s/p transfusion on aspirin alone. BP soft on low dose ACE and BB.     Physical Exam   Temp: 98.6  F (37  C) Temp src: Oral BP: 97/54 Pulse: 115   Resp: 16 SpO2: 100 % O2 Device: None (Room air)    Vitals:    09/14/22 0627 09/16/22 1019 09/17/22 0531   Weight: 123.9 kg (273 lb 3.2 oz) 123.6 kg (272 lb 6.4 oz) 124.2 kg (273 lb  12.8 oz)     Vital Signs with Ranges  Temp:  [98.3  F (36.8  C)-98.9  F (37.2  C)] 98.6  F (37  C)  Pulse:  [] 115  Resp:  [14-23] 16  BP: ()/(51-67) 97/54  SpO2:  [97 %-100 %] 100 %  I/O last 3 completed shifts:  In: 1246 [P.O.:920; I.V.:6]  Out: 1200 [Urine:1200]    GEN:  In general, this is a morbidly obese male in no acute distress.  Patient ambulatory.  HEENT:  Pupils equal, round. Sclerae nonicteric. Clear oropharynx. Mucous membranes moist.  NECK: Supple, no masses appreciated. Trachea midline. Difficult to assess JVD due to body habitus.   C/V:  Regular rate and rhythm, no murmur, rub or gallop.   RESP: Respirations are unlabored. No use of accessory muscles. Clear to auscultation bilaterally without wheezing, rales, or rhonchi.  GI: Obese Abdomen soft, nontender, nondistended. .   EXTREM: No LE edema. No cyanosis or clubbing.  NEURO: Alert and oriented, cooperative.  No obvious focal deficits.   PSYCH: Normal affect.  SKIN: Warm and dry. No rashes or petechiae appreciated.       Medications     Continuing ACE inhibitor/ARB/ARNI from home medication list OR ACE inhibitor/ARB order already placed during this visit       - MEDICATION INSTRUCTIONS -       - MEDICATION INSTRUCTIONS -       - MEDICATION INSTRUCTIONS -       - MEDICATION INSTRUCTIONS -       Percutaneous Coronary Intervention orders placed (this is information for BPA alerting)         aspirin  81 mg Oral Daily     [START ON 9/18/2022] atorvastatin  20 mg Oral Daily     buPROPion  150 mg Oral Daily     [START ON 9/18/2022] lisinopril  5 mg Oral Daily at 10 am     metoprolol succinate ER  12.5 mg Oral Daily     pantoprazole  40 mg Oral QAM AC     senna-docusate  1-2 tablet Oral BID     sodium chloride (PF)  3 mL Intracatheter Q8H       Data   Reviewed     Time Spent on this Encounter   I spent 20 minutes on the unit/floor managing the care of Markie Rico. Over 50% of my time was spent on the following:   - Counseling the patient and/or  family regarding: prognosis, recommended follow-up and medical compliance  - Coordination of care with the: primary service    Javier Menjivar MD

## 2022-09-17 NOTE — PROGRESS NOTES
[facility]  Hospitalist Progress Note   09/17/2022          Assessment and Plan:       Markie Rico is a 61 year old male with coronary artery disease, hypertension, hyperlipidemia, medically complicated obesity, osteoarthritis of bilateral hips emergently air lifted to the ED at [facility] on 9/12. Underwent elective total left hip replacement in [place] on 9/12/22, chest pain in the immediate postoperative period.     STEMI postoperatively after right hip total arthroplasty.  Ischemic Cardiomyopathy (LVEF 25- 35%) on echo 9/13/22  History of PCI to LAD and RCA remotely.  Coronary artery disease.  HTN, HLD  Work-up in the ED revealed anterior STEMI.  Troponin peaked to > 125k  Underwent emergent balloon angioplasty of his LAD on 9/12.  Echo with reduced EF of 25 to 30%.  Mid to distal anterior, anteroseptal hypokinesis.  No significant valve disease  Cardiology following, appreciate input.  Continue aspirin monotherapy per cardiology.  Plavix held due to anemia.  Continue low-dose lisinopril, metoprolol with hold parameters.  Noted soft blood pressures.  Continue Lipitor, decrease dose to 20 mg oral daily.  LDL 54, HDL 48.  Total cholesterol 119.  Continue telemetry monitoring.  Continue cardiac rehabilitation.  [facility] possible need [facility].     S/P Left MARIMAR at [facility]  on 9/12/2022  Underwent left MARIMAR on 9/12 at [place] and immediately began to have chest pain in the PACU.  Spinal anesthesia with EBL of 650 mL.   Orthopedic surgery following.  Appreciate input.  Continue pain medication as needed oxycodone, Tylenol  Defer pharmacological DVT prophylaxis to orthopedic team.  PT, OT following, will likely need ARU.  Care coordinator assistance with transition     Acute anemia likely from blood loss, dilutional.  Thrombocytopenia improved.  In the immediate postoperative period hemoglobin around 11.  Post angiogram hemoglobin dropped to 7.4 on  9/14.  Noted to have left thigh swelling. CTA No identified area of arterial extravasation or hematoma.  Interventional radiology followed-no IR procedure recommended.    Received 1 unit blood transfusion this stay, hemoglobin greater than 8.  Monitor daily.    Transaminitis likely due to ischemia.  AST peaked to 593, trending down  Avoid hepatotoxic drugs.  Monitor.     Postop elevated leukocytosis likely reactive  Trend WBC count, fever curve.  If spikes fever will consider chest x-ray, blood cultures    Dilated aortic root   Echo dilated aortic root measuring 4.6 cm,  ascending aorta measures 3.7. This may be overestimated. Consider CT for  further evalaution of aortic root/ascendin aorta measurements as outpatient.  Optimize blood pressures    Hypoalbuminemia likely dilutional.  Serum albumin 2.4.  Monitor in AM.    Hyperglycemia.  Hemoglobin A1c 5.5.  Risk factor modification.    Medically complicated obesity with a BMI of 39.09.  Possible obstructive sleep apnea  Consider lifestyle modification with diet and exercise as able to.  Consider sleep studies as outpatient.     Orders Placed This Encounter      Low Saturated Fat Na <2400 mg      DVT Prophylaxis: SCDs, ambulate.  Code Status: Full Code  Disposition: Expected discharge likely tomorrow, pending safe discharge plan in place.    Discussed with patient, bedside RN.  More than 50% of time spent in direct patient care, care coordination, patient counseling, and formalizing plan of care.     Андрей Bello MD        Interval History:        Patient lying in bed.  No chest pain or palpitations.  Denies any shortness of breath.  No nausea vomiting.  No Abdominal pain.  Afebrile.  Complaining of pain in his hip, receiving Tylenol and oxycodone.  No further bleeding.  Systolic blood pressures in 80 -100s.  PT following         Physical Exam:        Physical Exam   Temp:  [98.2  F (36.8  C)-98.9  F (37.2  C)] 98.7  F (37.1  C)  Pulse:  [] 92  Resp:   [12-26] 16  BP: ()/(51-68) 96/62  SpO2:  [97 %-99 %] 99 %    Intake/Output Summary (Last 24 hours) at 9/16/2022 0921  Last data filed at 9/16/2022 0734  Gross per 24 hour   Intake 250 ml   Output 2045 ml   Net -1795 ml       Admission Weight: 123.4 kg (272 lb)  Current Weight: 123.9 kg (273 lb 3.2 oz)    PHYSICAL EXAM  GENERAL: Patient is in no distress. Alert and oriented.  HEART: Regular rate and rhythm. S1S2. No murmurs  LUNGS: Bilateral clear breath sounds.  No wheezing or crackles.  Respirations unlabored  Abdomen soft nontender bowel sounds heard.  NEURO: Moving all extremities, no focal weakness  EXTREMITIES: No pedal edema. Left hip dressing intact.  SKIN: Warm, dry. No rash   PSYCHIATRY Cooperative       Medications:          aspirin  81 mg Oral Daily     atorvastatin  80 mg Oral Daily     buPROPion  150 mg Oral Daily     furosemide  10 mg Intravenous Once     lisinopril  5 mg Oral Daily     metoprolol succinate ER  12.5 mg Oral Daily     pantoprazole  40 mg Oral QAM AC     senna-docusate  1-2 tablet Oral BID     sodium chloride (PF)  3 mL Intracatheter Q8H     acetaminophen, calcium carbonate, Continuing ACE inhibitor/ARB/ARNI from home medication list OR ACE inhibitor/ARB order already placed during this visit, - MEDICATION INSTRUCTIONS -, - MEDICATION INSTRUCTIONS -, - MEDICATION INSTRUCTIONS -, hydrALAZINE, lidocaine 4%, lidocaine (buffered or not buffered), - MEDICATION INSTRUCTIONS -, methocarbamol, metoprolol, morphine, naloxone **OR** naloxone **OR** naloxone **OR** naloxone, nitroGLYcerin, ondansetron **OR** ondansetron, oxyCODONE **OR** oxyCODONE, Percutaneous Coronary Intervention orders placed (this is information for BPA alerting), sodium chloride (PF)         Data:      All new lab and imaging data was reviewed.

## 2022-09-17 NOTE — PROGRESS NOTES
ORTHO DAILY PROGRESS NOTE     POD #5   Markie Rico is a 61 year old male  s/p Procedure(s):  right total hip arthroplasty at [facility]       He had chest pain, and had an MI, transferred to [facility]  Coronary Angiogram  Percutaneous Coronary Intervention -  on 9/12/2022  Had 1 unit packed red blood cells      Had CT-Angio which showed no active arterial bleed, no hematoma.  On only ASA now for anticoagulation.    Interim: got another unit of PRBC's     SUBJECTIVE:  Complaints: no chest pain.  Left hip less painful. He reports that his preop pain is gone.  Nausea: none     OBJECTIVE:  ..BP (!) 89/61   Pulse 116   Temp 98.3  F (36.8  C)   Resp 16   Wt 124.2 kg (273 lb 12.8 oz)   SpO2 97%   BMI 39.29 kg/m        Hemovac output:  NA     General: in no apparent distress,  Awake, alert     Incision is covered, moderate thigh swelling-- seems a bit more than yesterday. No apparent blistering of skin.  CMSI   Toes warm and well perfused  Calves soft and nontender to squeeze.        Recent Labs   Lab Test 09/17/22  0628 09/16/22  2238 09/16/22  1603 09/16/22  0558 09/15/22  1210 09/15/22  0547   HGB 8.9*  8.9* 8.0* 8.4* 8.9*  8.9*   < > 7.9*  7.9*     --   --  179  --  130*    < > = values in this interval not displayed.     Recent Labs   Lab Test 09/17/22  0628 09/16/22  0558 09/15/22  0547   POTASSIUM 3.7 4.0 3.8   BUN 14 13 11   CR 0.60* 0.61* 0.55*     Recent Labs   Lab Test 04/14/18  1444   INR 0.97       PT:  Is seeing him daily, and he's working on bed exercises.           ASSESSMENT:  Status post total hip arthroplasty doing better.  MI and CHF slowing him down at this point. Hypotension and tachycardia continues, although pulse is in the 90's today...    PLAN:  Continue PHYSICAL THERAPY  Monitor hgb daily now.  Discharge planned: when cleared. I suspect he may need to go to a rehab center upon discharge, physical therapy's input on this would be appreciated.  He thinks he can go home.  He is  concerned about being discharged with his hgb not staying up..   Avoid narcotics as much as possible to help keep blood pressure up.    PEPE Briseno MD  Orthopedic Surgery   [facility]  P: [phone]

## 2022-09-17 NOTE — PLAN OF CARE
Goal Outcome Evaluation:  A&O x4, VSS ex soft BP & tachy HR, on RA. Tele ST/SR at times. Hgb 8.0  overnight, transfused 1 unit PRBC as ordered & rechecked 8.9.  Left hip drsg CDI. Pain managed with Robaxin & oxycodone. Ambulated in room this am with 1 assist, walker & gait belt. No BM this shift, passing flatus. CMS intact ex R hand numbness-reported baseline. Plans to discharge pending improvement.

## 2022-09-17 NOTE — PLAN OF CARE
Goal Outcome Evaluation:A&Ox4, denies CP or SOB. Left hip pain treated with with oxycodone. Tele remains ST, SBP still soft but remains above 90, asymptomatic. Hemoglobin q8 , last value 8.4. BM x2 today. Plan to continue monitoring hemoglobin, likely discharge to TCU next day or 2

## 2022-09-17 NOTE — PLAN OF CARE
Vitals: Stable  Lungs:  WNL. Clear. Room air. Using IS indp.   CV: NSR/-120s   GI: WNL obese   Uro: WNL  CMS: baseline upper extremity tingling, otherwise CMS Intact.   Neuro: no deficits noted.   Skin: Left hip dressing dry and intact. Right groin site WNL. Ecchymotic to arms.   Psych:  WNL  MSK: Ax-1 belt and walker. PT/OT following. Recommending TCU.   Pain: Left hip pain tx with PRN Oxycodone and Robaxin prior to therapy with manageble pain.   Labs: no new concerns noted. Hgb 8.9 -  improved   Tests/Procedures: na  Other:  Cardiology following. [place] [facility]

## 2022-09-17 NOTE — PROGRESS NOTES
09/17/22 1526   Quick Adds   Type of Visit Initial Occupational Therapy Evaluation   Living Environment   People in Home spouse   Current Living Arrangements house   Living Environment Comments Pt lives in single story home, 4 ASIM no railings, then all needs on main level   Self-Care   Usual Activity Tolerance good   Current Activity Tolerance moderate   Activity/Exercise/Self-Care Comment tub shower with grab bar   Instrumental Activities of Daily Living (IADL)   Previous Responsibilities work;driving;finances;medication management   General Information   Onset of Illness/Injury or Date of Surgery 09/12/22   Referring Physician Luis Miguel Jose MD   Patient/Family Therapy Goal Statement (OT) I'd like to go to rehab up by where I live   Additional Occupational Profile Info/Pertinent History of Current Problem OT: Orders received. Chart reviewed and discussed with care team.  OT not indicated due to at plan for discharge is to TCU, so will defer OT eval to next level of care. Pt not able to tolerate much OOB activity so will allow time for medical healing and progression of mobility with PT.  Defer discharge recommendations to medical team.  Will complete orders.   Existing Precautions/Restrictions no hip IR;90 degree hip flexion;no hip ADD past midline;no pivoting or twisting;fall   General Observations and Info pt sitting up in bed, agreeable to OT   Cognitive Status Examination   Orientation Status orientation to person, place and time   Visual Perception   Visual Impairment/Limitations WNL   Sensory   Sensory Quick Adds No deficits were identified   Pain Assessment   Patient Currently in Pain No   Integumentary/Edema   Integumentary/Edema no deficits were identifed   Posture   Posture not impaired   Range of Motion Comprehensive   General Range of Motion no range of motion deficits identified   Strength Comprehensive (MMT)   General Manual Muscle Testing (MMT) Assessment no strength deficits identified   Comment,  General Manual Muscle Testing (MMT) Assessment functional with tasks during eval   Muscle Tone Assessment   Muscle Tone Quick Adds No deficits were identified   Coordination   Upper Extremity Coordination No deficits were identified   Bed Mobility   Comment (Bed Mobility) mod A supine to sit   Transfers   Transfer Comments min A sit to stand   Activities of Daily Living   BADL Assessment/Intervention lower body dressing;grooming   Lower Body Dressing Assessment/Training   [place] Level (Lower Body Dressing) unable to assess   Grooming Assessment/Training   [place] Level (Grooming) supervision   Clinical Impression   Criteria for Skilled Therapeutic Interventions Met (OT) Yes, treatment indicated   OT Diagnosis decreased activity tolerance and functional mobility   OT Problem List-Impairments impacting ADL problems related to;activity tolerance impaired;strength;pain;post-surgical precautions;flexibility   Assessment of Occupational Performance 1-3 Performance Deficits   Identified Performance Deficits decreased dressing, BR transfers, home mgmt   Planned Therapy Interventions (OT) ADL retraining;IADL retraining;balance training;bed mobility training;transfer training;progressive activity/exercise   Clinical Decision Making Complexity (OT) low complexity   Risk & Benefits of therapy have been explained evaluation/treatment results reviewed;care plan/treatment goals reviewed;risks/benefits reviewed;current/potential barriers reviewed;participants voiced agreement with care plan;participants included;patient   OT Discharge Planning   OT Discharge Recommendation (DC Rec) Acute Rehab Center-Motivated patient will benefit from intensive, interdisciplinary therapy.  Anticipate will be able to tolerate 3 hours of therapy per day;Transitional Care Facility   OT Rationale for DC Rec pt is below baseline, is very motivated to return to [facility]. pt has supportive family and anticipate he will be able to tolerate 3  hours of therapy per day. if not ARU, then would benefit from TCU   Total Evaluation Time (Minutes)   Total Evaluation Time (Minutes) 10   OT Goals   Therapy Frequency (OT) Daily   OT Predicted Duration/Target Date for Goal Attainment 09/22/22   OT Goals Hygiene/Grooming;Upper Body Dressing;Lower Body Dressing;Toilet Transfer/Toileting   OT: Hygiene/Grooming modified independent   OT: Upper Body Dressing Modified independent;including set-up/clothing retrieval   OT: Lower Body Dressing Modified independent;using adaptive equipment;within precautions;including set-up/clothing retrieval   OT: Toilet Transfer/Toileting Modified independent;toilet transfer;cleaning and garment management

## 2022-09-18 ENCOUNTER — APPOINTMENT (OUTPATIENT)
Dept: PHYSICAL THERAPY | Facility: CLINIC | Age: 62
DRG: 250 | End: 2022-09-18
Payer: COMMERCIAL

## 2022-09-18 LAB
ALBUMIN SERPL-MCNC: 2.2 G/DL (ref 3.4–5)
ALP SERPL-CCNC: 68 U/L (ref 40–150)
ALT SERPL W P-5'-P-CCNC: 71 U/L (ref 0–70)
ANION GAP SERPL CALCULATED.3IONS-SCNC: 4 MMOL/L (ref 3–14)
AST SERPL W P-5'-P-CCNC: 39 U/L (ref 0–45)
BILIRUB SERPL-MCNC: 1 MG/DL (ref 0.2–1.3)
BUN SERPL-MCNC: 13 MG/DL (ref 7–30)
CALCIUM SERPL-MCNC: 8.3 MG/DL (ref 8.5–10.1)
CHLORIDE BLD-SCNC: 109 MMOL/L (ref 94–109)
CO2 SERPL-SCNC: 26 MMOL/L (ref 20–32)
CREAT SERPL-MCNC: 0.53 MG/DL (ref 0.66–1.25)
ERYTHROCYTE [DISTWIDTH] IN BLOOD BY AUTOMATED COUNT: 14.6 % (ref 10–15)
GFR SERPL CREATININE-BSD FRML MDRD: >90 ML/MIN/1.73M2
GLUCOSE BLD-MCNC: 110 MG/DL (ref 70–99)
HCT VFR BLD AUTO: 30.3 % (ref 40–53)
HGB BLD-MCNC: 9.6 G/DL (ref 13.3–17.7)
MAGNESIUM SERPL-MCNC: 2.5 MG/DL (ref 1.6–2.3)
MCH RBC QN AUTO: 28.8 PG (ref 26.5–33)
MCHC RBC AUTO-ENTMCNC: 31.7 G/DL (ref 31.5–36.5)
MCV RBC AUTO: 91 FL (ref 78–100)
PLATELET # BLD AUTO: 249 10E3/UL (ref 150–450)
POTASSIUM BLD-SCNC: 3.9 MMOL/L (ref 3.4–5.3)
PROT SERPL-MCNC: 5.8 G/DL (ref 6.8–8.8)
RBC # BLD AUTO: 3.33 10E6/UL (ref 4.4–5.9)
SODIUM SERPL-SCNC: 139 MMOL/L (ref 133–144)
WBC # BLD AUTO: 10.2 10E3/UL (ref 4–11)

## 2022-09-18 PROCEDURE — 250N000013 HC RX MED GY IP 250 OP 250 PS 637: Performed by: NURSE PRACTITIONER

## 2022-09-18 PROCEDURE — 36415 COLL VENOUS BLD VENIPUNCTURE: CPT | Performed by: HOSPITALIST

## 2022-09-18 PROCEDURE — 80053 COMPREHEN METABOLIC PANEL: CPT | Performed by: HOSPITALIST

## 2022-09-18 PROCEDURE — 210N000002 HC R&B HEART CARE

## 2022-09-18 PROCEDURE — 83735 ASSAY OF MAGNESIUM: CPT | Performed by: HOSPITALIST

## 2022-09-18 PROCEDURE — 250N000013 HC RX MED GY IP 250 OP 250 PS 637: Performed by: HOSPITALIST

## 2022-09-18 PROCEDURE — 99231 SBSQ HOSP IP/OBS SF/LOW 25: CPT | Performed by: INTERNAL MEDICINE

## 2022-09-18 PROCEDURE — 82040 ASSAY OF SERUM ALBUMIN: CPT | Performed by: HOSPITALIST

## 2022-09-18 PROCEDURE — 97110 THERAPEUTIC EXERCISES: CPT | Mod: GP

## 2022-09-18 PROCEDURE — 250N000013 HC RX MED GY IP 250 OP 250 PS 637: Performed by: INTERNAL MEDICINE

## 2022-09-18 PROCEDURE — 97530 THERAPEUTIC ACTIVITIES: CPT | Mod: GP

## 2022-09-18 PROCEDURE — 85014 HEMATOCRIT: CPT | Performed by: INTERNAL MEDICINE

## 2022-09-18 PROCEDURE — 99232 SBSQ HOSP IP/OBS MODERATE 35: CPT | Performed by: HOSPITALIST

## 2022-09-18 RX ADMIN — PANTOPRAZOLE SODIUM 40 MG: 40 TABLET, DELAYED RELEASE ORAL at 06:20

## 2022-09-18 RX ADMIN — OXYCODONE HYDROCHLORIDE 5 MG: 5 TABLET ORAL at 06:20

## 2022-09-18 RX ADMIN — OXYCODONE HYDROCHLORIDE 5 MG: 5 TABLET ORAL at 23:41

## 2022-09-18 RX ADMIN — BUPROPION HYDROCHLORIDE 150 MG: 150 TABLET, EXTENDED RELEASE ORAL at 11:21

## 2022-09-18 RX ADMIN — OXYCODONE HYDROCHLORIDE 5 MG: 5 TABLET ORAL at 02:23

## 2022-09-18 RX ADMIN — METHOCARBAMOL 500 MG: 500 TABLET ORAL at 23:47

## 2022-09-18 RX ADMIN — ATORVASTATIN CALCIUM 20 MG: 20 TABLET, FILM COATED ORAL at 11:22

## 2022-09-18 RX ADMIN — ACETAMINOPHEN 650 MG: 325 TABLET ORAL at 11:22

## 2022-09-18 RX ADMIN — OXYCODONE HYDROCHLORIDE 5 MG: 5 TABLET ORAL at 13:28

## 2022-09-18 RX ADMIN — ACETAMINOPHEN 650 MG: 325 TABLET ORAL at 06:20

## 2022-09-18 RX ADMIN — ACETAMINOPHEN 650 MG: 325 TABLET ORAL at 18:37

## 2022-09-18 RX ADMIN — ACETAMINOPHEN 650 MG: 325 TABLET ORAL at 02:23

## 2022-09-18 RX ADMIN — METHOCARBAMOL 500 MG: 500 TABLET ORAL at 11:22

## 2022-09-18 RX ADMIN — ASPIRIN 81 MG: 81 TABLET, COATED ORAL at 11:21

## 2022-09-18 ASSESSMENT — ACTIVITIES OF DAILY LIVING (ADL)
ADLS_ACUITY_SCORE: 44
ADLS_ACUITY_SCORE: 45
ADLS_ACUITY_SCORE: 44
ADLS_ACUITY_SCORE: 45

## 2022-09-18 NOTE — PLAN OF CARE
Goal Outcome Evaluation:    [id number]  Vitals: Stable  Lungs:  WNL. Clear. Room air. Using IS indp.   CV: SR with 1st degree AV Block  GI: WNL obese   Uro: WNL  CMS: baseline upper extremity tingling, otherwise CMS Intact.   Neuro: no deficits noted.   Skin: Left hip dressing dry and intact. Right groin site WNL. Ecchymotic to arms.   Psych:  WNL  MSK: Ax-1 belt and walker. PT/OT following. Recommending TCU.   Pain: Left hip pain tx with PRN Oxycodone.  Also has prn Robaxin available for pain.  Labs: no new concerns noted. Hgb 8.9 -  improved   Tests/Procedures: na  Other:  Cardiology following. [place] [facility] [place].

## 2022-09-18 NOTE — PROGRESS NOTES
[facility]  Hospitalist Progress Note   09/18/2022          Assessment and Plan:       Markie Rico is a 61 year old male with coronary artery disease, hypertension, hyperlipidemia, medically complicated obesity, osteoarthritis of bilateral hips emergently air lifted to the ED at [facility] on 9/12. Underwent elective total left hip replacement in [place] on 9/12/22, chest pain in the immediate postoperative period.     STEMI postoperatively after right hip total arthroplasty.  Ischemic Cardiomyopathy (LVEF 25- 35%) on echo 9/13/22  History of PCI to LAD and RCA remotely.  Coronary artery disease.  HTN, HLD  Work-up in the ED revealed anterior STEMI.  Troponin peaked to > 125k  Underwent emergent balloon angioplasty of his LAD on 9/12.  Echo with reduced EF of 25 to 30%.  Mid to distal anterior, anteroseptal hypokinesis.  No significant valve disease  Cardiology following, appreciate input.  Continue aspirin monotherapy per cardiology.  Plavix held due to anemia.  Continue low-dose lisinopril, metoprolol with hold parameters.  Noted soft blood pressures.  Continue Lipitor, decrease dose to 20 mg oral daily.  LDL 54, HDL 48.  Total cholesterol 119.  Continue telemetry monitoring.  Continue cardiac rehabilitation.  [facility] of possible need [facility]G.     S/P Left MARIMAR at [facility] [place]  on 9/12/2022  Underwent left MARIMAR on 9/12 at [place] and immediately began to have chest pain in the PACU.  Spinal anesthesia with EBL of 650 mL.   Orthopedic surgery following.  Follow-up in clinic after discharge.  Appreciate input.  Continue pain medication as needed oxycodone, Tylenol.  Advised narcotic use given soft blood pressures.  Defer pharmacological DVT prophylaxis to orthopedic team. (On aspirin)  PT, OT following, will likely need ARU vs TCU.  Patient open to going to rehab if in [place] area.  Care coordinator assistance with transition     Acute anemia likely  from blood loss, dilutional.  Thrombocytopenia improved.  In the immediate postoperative period hemoglobin around 11.    Post angiogram hemoglobin dropped to 7.4 on 9/14. Noted to have left thigh swelling.   CTA no identified area of arterial extravasation or hematoma.  Interventional radiology followed-no IR procedure recommended.    Received 1 unit blood transfusion [9/15], hemoglobin stable greater than 8 for the last few days.  Monitor periodically.    Transaminitis likely due to ischemia.  AST peaked to 593, trending down  Avoid hepatotoxic drugs.  Monitor.     Postop elevated leukocytosis likely reactive  Trend WBC count, fever curve.    Dilated aortic root   Echo dilated aortic root measuring 4.6 cm,  ascending aorta measures 3.7. This may be overestimated. Consider CT for  further evalaution of aortic root/ascendin aorta measurements as outpatient.  Optimize blood pressures    Hypoalbuminemia likely dilutional.  Serum albumin 2.2.  Monitor in AM.    Hyperglycemia.  Hemoglobin A1c 5.5.  Monitor     Medically complicated obesity with a BMI of 39.09.  Possible obstructive sleep apnea  Consider lifestyle modification with diet and exercise as able to.  Consider sleep studies as outpatient.     Orders Placed This Encounter      Low Saturated Fat Na <2400 mg      DVT Prophylaxis: SCDs, ambulate.  Code Status: Full Code  Disposition: Expected discharge pending safe discharge plan in place.    Discussed with patient, bedside R, care coordinator.   More than 70% of time spent in direct patient care, care coordination, patient counseling, and formalizing plan of care.     Андрей Bello MD        Interval History:        Patient lying in bed.  No chest pain or palpitations.  Denies any shortness of breath.  No nausea vomiting.  No Abdominal pain.  Afebrile.  Surgical pain improving.  Systolic blood pressures in 80 -100s.  Telemetry normal sinus rhythm, sinus tachycardia.         Physical Exam:        Physical Exam    Temp:  [98  F (36.7  C)-98.6  F (37  C)] 98.5  F (36.9  C)  Pulse:  [102-121] 102  Resp:  [16-31] 18  BP: ()/(54-70) 90/61  SpO2:  [97 %-100 %] 97 %    Intake/Output Summary (Last 24 hours) at 9/16/2022 0921  Last data filed at 9/16/2022 0734  Gross per 24 hour   Intake 250 ml   Output 2045 ml   Net -1795 ml       Admission Weight: 123.4 kg (272 lb)  Current Weight: 123.9 kg (273 lb 3.2 oz)    PHYSICAL EXAM  GENERAL: Patient is in no distress. Alert and oriented.  HEART: Regular rate and rhythm. S1S2. No murmurs  LUNGS: Bilateral clear breath sounds.  No wheezing or crackles.  Respirations unlabored  Abdomen soft nontender bowel sounds heard.  NEURO: Moving all extremities, no focal weakness  EXTREMITIES: No pedal edema. Left hip dressing intact.  SKIN: Warm, dry. No rash   PSYCHIATRY Cooperative       Medications:          aspirin  81 mg Oral Daily     atorvastatin  20 mg Oral Daily     buPROPion  150 mg Oral Daily     lisinopril  5 mg Oral Daily at 10 am     metoprolol succinate ER  12.5 mg Oral Daily     pantoprazole  40 mg Oral QAM AC     senna-docusate  1-2 tablet Oral BID     sodium chloride (PF)  3 mL Intracatheter Q8H     acetaminophen, calcium carbonate, Continuing ACE inhibitor/ARB/ARNI from home medication list OR ACE inhibitor/ARB order already placed during this visit, - MEDICATION INSTRUCTIONS -, - MEDICATION INSTRUCTIONS -, - MEDICATION INSTRUCTIONS -, hydrALAZINE, lidocaine 4%, lidocaine (buffered or not buffered), - MEDICATION INSTRUCTIONS -, methocarbamol, metoprolol, naloxone **OR** naloxone **OR** naloxone **OR** naloxone, nitroGLYcerin, ondansetron **OR** ondansetron, oxyCODONE **OR** [DISCONTINUED] oxyCODONE, Percutaneous Coronary Intervention orders placed (this is information for BPA alerting), sodium chloride (PF)         Data:      All new lab and imaging data was reviewed.

## 2022-09-18 NOTE — PLAN OF CARE
[id number]  Patient alert and oriented times four.  NSR/ST  Baseline upper extremity tingling  Left hip dressing WDL dry and intact, right groin site WDL   Assist A1W  Plans: PT, OT, social work for TCU

## 2022-09-18 NOTE — PROGRESS NOTES
[facility]  Cardiology Progress Note    Date of Service (when I saw the patient): 09/18/2022       Assessment & Plan   Markie Rico is a 61 year old male who was admitted on 9/12/2022 with a STEMI post operatively after right total hip arthroplasty.     1. STEMI post operatively after right total hip arthroplasty (9/12/2022)   - Troponin > 125,000, trending down to 18,797  - s/p angiogram with balloon angioplasty of the previous ostial to mid LAD stent Lcx and OM1 disease. H/o remote PCI to LAD and RCA.  Recommended for consideration of possible CABG given MVD and multiple stent closure of the LAD.   - Plavix held d/t anemia. 8.9 today, stable.   - Aspirin alone.     2. Ischemic Cardiomyopathy   - Echocardiogram showed severely reduced EF 25-30%, mid to distal anterior, anteroseptal hypokinesis, akinetic apex. No significant valvular disease.     3. Hypertension  - Borderline soft.   - Low dose lisinopril and metoprolol     4. Hyperlipidemia  - High potency statin     5. Anemia - Hgb 8.9 s/p 1 unit 9/15  - Abdominal CT showed no evidence of bleeding or hematoma     6. Aortic Root dilation - 4.6 cm  7. Ascending aortic dilation - 3.7 cm   8. Obesity     Plan  1. Chest pain resolved. Tele confirms NSR with T-wave inversion. Will update ECG today for current baseline prior to discharge.  2. Anemia stable and slightly improving with Hbg 9.6. Plavix stopped. Continue aspirin alone.   3. Severely reduced EF 25-30%. Continue with GDMT, lisinopril 5 mg daily and metoprolol succinate 12.5 mg daily. Soft blood pressures, uptitrate as BP allows.   4. Continue atorvastatin 20 mg daily.  5. [facility] for evaluation of possible need for CABG.   6. Stable for discharge from a cardiology standpoint, final timing of discharge and disposition left to primary hospital service.     Physical Exam   Temp: 98.5  F (36.9  C) Temp src: Oral BP: 90/61 Pulse: 102   Resp: 18 SpO2: 97 % O2 Device:  None (Room air)    Vitals:    09/14/22 0627 09/16/22 1019 09/17/22 0531   Weight: 123.9 kg (273 lb 3.2 oz) 123.6 kg (272 lb 6.4 oz) 124.2 kg (273 lb 12.8 oz)     Vital Signs with Ranges  Temp:  [98  F (36.7  C)-98.6  F (37  C)] 98.5  F (36.9  C)  Pulse:  [102-121] 102  Resp:  [16-31] 18  BP: ()/(54-70) 90/61  SpO2:  [97 %-100 %] 97 %  I/O last 3 completed shifts:  In: 686 [P.O.:680; I.V.:6]  Out: 1300 [Urine:1300]    GEN:  In general, this is a morbidly obese male in no acute distress.  Patient ambulatory.  HEENT:  Pupils equal, round. Sclerae nonicteric. Clear oropharynx. Mucous membranes moist.  NECK: Supple, no masses appreciated. Trachea midline. Difficult to assess JVD due to body habitus.   C/V:  Regular rate and rhythm, no murmur, rub or gallop.   RESP: Respirations are unlabored. No use of accessory muscles. Clear to auscultation bilaterally without wheezing, rales, or rhonchi.  GI: Obese Abdomen soft, nontender, nondistended. .   EXTREM: No LE edema. No cyanosis or clubbing.  NEURO: Alert and oriented, cooperative.  No obvious focal deficits.   PSYCH: Normal affect.  SKIN: Warm and dry. No rashes or petechiae appreciated.       Medications     Continuing ACE inhibitor/ARB/ARNI from home medication list OR ACE inhibitor/ARB order already placed during this visit       - MEDICATION INSTRUCTIONS -       - MEDICATION INSTRUCTIONS -       - MEDICATION INSTRUCTIONS -       - MEDICATION INSTRUCTIONS -       Percutaneous Coronary Intervention orders placed (this is information for BPA alerting)         aspirin  81 mg Oral Daily     atorvastatin  20 mg Oral Daily     buPROPion  150 mg Oral Daily     lisinopril  5 mg Oral Daily at 10 am     metoprolol succinate ER  12.5 mg Oral Daily     pantoprazole  40 mg Oral QAM AC     senna-docusate  1-2 tablet Oral BID     sodium chloride (PF)  3 mL Intracatheter Q8H       Data   Reviewed     Time Spent on this Encounter   I spent 20 minutes on the unit/floor managing the  care of Markie Rico. Over 50% of my time was spent on the following:   - Counseling the patient and/or family regarding: prognosis, recommended follow-up and medical compliance  - Coordination of care with the: primary service    Javier Menjivar MD

## 2022-09-18 NOTE — CARE PLAN
Occupational Therapy Discharge Summary    Reason for therapy discharge:    Patient/family request discontinuation of services.    Progress towards therapy goal(s). See goals on Care Plan in [facility] electronic health record for goal details.  Goals not met.  Barriers to achieving goals:   Patient declines further OT intervention.    Therapy recommendation(s):    No further therapy is recommended.

## 2022-09-19 ENCOUNTER — APPOINTMENT (OUTPATIENT)
Dept: PHYSICAL THERAPY | Facility: CLINIC | Age: 62
DRG: 250 | End: 2022-09-19
Payer: COMMERCIAL

## 2022-09-19 LAB
ALBUMIN SERPL-MCNC: 2.3 G/DL (ref 3.4–5)
ALP SERPL-CCNC: 71 U/L (ref 40–150)
ALT SERPL W P-5'-P-CCNC: 61 U/L (ref 0–70)
ANION GAP SERPL CALCULATED.3IONS-SCNC: 7 MMOL/L (ref 3–14)
AST SERPL W P-5'-P-CCNC: 28 U/L (ref 0–45)
BILIRUB DIRECT SERPL-MCNC: 0.2 MG/DL (ref 0–0.2)
BILIRUB SERPL-MCNC: 0.7 MG/DL (ref 0.2–1.3)
BUN SERPL-MCNC: 14 MG/DL (ref 7–30)
CALCIUM SERPL-MCNC: 8.5 MG/DL (ref 8.5–10.1)
CHLORIDE BLD-SCNC: 105 MMOL/L (ref 94–109)
CO2 SERPL-SCNC: 27 MMOL/L (ref 20–32)
CREAT SERPL-MCNC: 0.6 MG/DL (ref 0.66–1.25)
ERYTHROCYTE [DISTWIDTH] IN BLOOD BY AUTOMATED COUNT: 14.6 % (ref 10–15)
GFR SERPL CREATININE-BSD FRML MDRD: >90 ML/MIN/1.73M2
GLUCOSE BLD-MCNC: 117 MG/DL (ref 70–99)
HCT VFR BLD AUTO: 29.4 % (ref 40–53)
HGB BLD-MCNC: 9.2 G/DL (ref 13.3–17.7)
MAGNESIUM SERPL-MCNC: 2.6 MG/DL (ref 1.6–2.3)
MCH RBC QN AUTO: 28.8 PG (ref 26.5–33)
MCHC RBC AUTO-ENTMCNC: 31.3 G/DL (ref 31.5–36.5)
MCV RBC AUTO: 92 FL (ref 78–100)
PLATELET # BLD AUTO: 324 10E3/UL (ref 150–450)
POTASSIUM BLD-SCNC: 3.9 MMOL/L (ref 3.4–5.3)
PROT SERPL-MCNC: 6.2 G/DL (ref 6.8–8.8)
RBC # BLD AUTO: 3.19 10E6/UL (ref 4.4–5.9)
SODIUM SERPL-SCNC: 139 MMOL/L (ref 133–144)
WBC # BLD AUTO: 10.6 10E3/UL (ref 4–11)

## 2022-09-19 PROCEDURE — 250N000013 HC RX MED GY IP 250 OP 250 PS 637: Performed by: NURSE PRACTITIONER

## 2022-09-19 PROCEDURE — 99232 SBSQ HOSP IP/OBS MODERATE 35: CPT | Performed by: HOSPITALIST

## 2022-09-19 PROCEDURE — 36415 COLL VENOUS BLD VENIPUNCTURE: CPT | Performed by: INTERNAL MEDICINE

## 2022-09-19 PROCEDURE — 82310 ASSAY OF CALCIUM: CPT | Performed by: INTERNAL MEDICINE

## 2022-09-19 PROCEDURE — 97110 THERAPEUTIC EXERCISES: CPT | Mod: GP | Performed by: PHYSICAL THERAPIST

## 2022-09-19 PROCEDURE — 250N000013 HC RX MED GY IP 250 OP 250 PS 637: Performed by: HOSPITALIST

## 2022-09-19 PROCEDURE — 83735 ASSAY OF MAGNESIUM: CPT | Performed by: HOSPITALIST

## 2022-09-19 PROCEDURE — 97530 THERAPEUTIC ACTIVITIES: CPT | Mod: GP | Performed by: PHYSICAL THERAPIST

## 2022-09-19 PROCEDURE — 97116 GAIT TRAINING THERAPY: CPT | Mod: GP | Performed by: PHYSICAL THERAPIST

## 2022-09-19 PROCEDURE — 250N000013 HC RX MED GY IP 250 OP 250 PS 637: Performed by: INTERNAL MEDICINE

## 2022-09-19 PROCEDURE — 210N000002 HC R&B HEART CARE

## 2022-09-19 PROCEDURE — 82248 BILIRUBIN DIRECT: CPT | Performed by: HOSPITALIST

## 2022-09-19 PROCEDURE — 85027 COMPLETE CBC AUTOMATED: CPT | Performed by: INTERNAL MEDICINE

## 2022-09-19 PROCEDURE — 99232 SBSQ HOSP IP/OBS MODERATE 35: CPT | Performed by: INTERNAL MEDICINE

## 2022-09-19 RX ORDER — CLOPIDOGREL BISULFATE 75 MG/1
75 TABLET ORAL DAILY
Status: DISCONTINUED | OUTPATIENT
Start: 2022-09-19 | End: 2022-09-20 | Stop reason: HOSPADM

## 2022-09-19 RX ORDER — OXYCODONE HYDROCHLORIDE 5 MG/1
5-10 TABLET ORAL EVERY 4 HOURS PRN
Status: DISCONTINUED | OUTPATIENT
Start: 2022-09-19 | End: 2022-09-20 | Stop reason: HOSPADM

## 2022-09-19 RX ADMIN — METOPROLOL SUCCINATE 12.5 MG: 25 TABLET, EXTENDED RELEASE ORAL at 08:30

## 2022-09-19 RX ADMIN — ATORVASTATIN CALCIUM 20 MG: 20 TABLET, FILM COATED ORAL at 08:30

## 2022-09-19 RX ADMIN — OXYCODONE HYDROCHLORIDE 5 MG: 5 TABLET ORAL at 08:30

## 2022-09-19 RX ADMIN — OXYCODONE HYDROCHLORIDE 10 MG: 5 TABLET ORAL at 18:05

## 2022-09-19 RX ADMIN — METHOCARBAMOL 500 MG: 500 TABLET ORAL at 18:07

## 2022-09-19 RX ADMIN — CLOPIDOGREL BISULFATE 75 MG: 75 TABLET ORAL at 11:56

## 2022-09-19 RX ADMIN — METHOCARBAMOL 500 MG: 500 TABLET ORAL at 21:54

## 2022-09-19 RX ADMIN — METHOCARBAMOL 500 MG: 500 TABLET ORAL at 08:34

## 2022-09-19 RX ADMIN — OXYCODONE HYDROCHLORIDE 5 MG: 5 TABLET ORAL at 13:53

## 2022-09-19 RX ADMIN — PANTOPRAZOLE SODIUM 40 MG: 40 TABLET, DELAYED RELEASE ORAL at 07:16

## 2022-09-19 RX ADMIN — BUPROPION HYDROCHLORIDE 150 MG: 150 TABLET, EXTENDED RELEASE ORAL at 08:30

## 2022-09-19 RX ADMIN — SENNOSIDES AND DOCUSATE SODIUM 2 TABLET: 50; 8.6 TABLET ORAL at 08:30

## 2022-09-19 RX ADMIN — OXYCODONE HYDROCHLORIDE 5 MG: 5 TABLET ORAL at 04:28

## 2022-09-19 RX ADMIN — ASPIRIN 81 MG: 81 TABLET, COATED ORAL at 08:30

## 2022-09-19 RX ADMIN — ACETAMINOPHEN 650 MG: 325 TABLET ORAL at 09:50

## 2022-09-19 RX ADMIN — SENNOSIDES AND DOCUSATE SODIUM 2 TABLET: 50; 8.6 TABLET ORAL at 20:16

## 2022-09-19 RX ADMIN — OXYCODONE HYDROCHLORIDE 5 MG: 5 TABLET ORAL at 21:54

## 2022-09-19 ASSESSMENT — ACTIVITIES OF DAILY LIVING (ADL)
ADLS_ACUITY_SCORE: 45

## 2022-09-19 NOTE — PROGRESS NOTES
[facility]  Hospitalist Progress Note   09/19/2022          Assessment and Plan:       Markie Rico is a 61 year old male with coronary artery disease, hypertension, hyperlipidemia, medically complicated obesity, osteoarthritis of bilateral hips emergently air lifted to the ED at [facility] on 9/12. Underwent elective total left hip replacement in [place] on 9/12/22, chest pain in the immediate postoperative period.     STEMI postoperatively after right hip total arthroplasty.  Ischemic Cardiomyopathy (LVEF 25- 35%) on echo 9/13/22  History of PCI to LAD and RCA remotely.  Coronary artery disease.  HTN, HLD  Work-up in the ED revealed anterior STEMI.  Troponin peaked to > 125k  Underwent emergent balloon angioplasty of his LAD on 9/12.  Echo with reduced EF of 25 to 30%.  Mid to distal anterior, anteroseptal hypokinesis.  No significant valve disease  plan  - Cardiology following, appreciate input.  - continue aspirin, restarted plavix on 9/19  - Continue low-dose lisinopril, metoprolol with hold parameters.    - Continue Lipitor, decrease dose to 20 mg oral daily.  LDL 54, HDL 48.  Total cholesterol 119.  - Continue telemetry monitoring.  - Continue cardiac rehabilitation.  [facility] for evaluation of possible need for CABG.     S/P Left MARIMAR at [facility]  on 9/12/2022  Underwent left MARIMAR on 9/12 at [place] and immediately began to have chest pain in the PACU.  Spinal anesthesia with EBL of 650 mL.   Orthopedic surgery following.  Follow-up in clinic after discharge.  Appreciate input.  Continue pain medication as needed oxycodone, Tylenol.  Advised narcotic use given soft blood pressures.  Defer pharmacological DVT prophylaxis to orthopedic team. (On aspirin)  PT, OT following, planning on discharge home with asisstance  Care coordinator assistance with transition     Acute anemia likely from blood loss, dilutional.  Thrombocytopenia improved.  In the immediate  postoperative period hemoglobin around 11.    Post angiogram hemoglobin dropped to 7.4 on 9/14. Noted to have left thigh swelling.   CTA no identified area of arterial extravasation or hematoma.  Interventional radiology followed-no IR procedure recommended.    Received 1 unit blood transfusion [9/15], hemoglobin stable greater than 8 for the last few days.  Monitor periodically.    Transaminitis likely due to ischemia.  AST peaked to 593, trending down  Avoid hepatotoxic drugs.  Monitor.     Postop elevated leukocytosis likely reactive  Trend WBC count, fever curve.    Dilated aortic root   Echo dilated aortic root measuring 4.6 cm,  ascending aorta measures 3.7. This may be overestimated. Consider CT for  further evalaution of aortic root/ascendin aorta measurements as outpatient.  Optimize blood pressures    Hypoalbuminemia likely dilutional.  Serum albumin 2.2.  Monitor in AM.    Hyperglycemia.  Hemoglobin A1c 5.5.  Monitor     Medically complicated obesity with a BMI of 39.09.  Possible obstructive sleep apnea  Consider lifestyle modification with diet and exercise as able to.  Consider sleep studies as outpatient.     Orders Placed This Encounter      Low Saturated Fat Na <2400 mg      DVT Prophylaxis: SCDs, ambulate.  Code Status: Full Code  Disposition: Expected discharge pending safe discharge plan in place.    Discussed with patient, chelsie from cardiology    Justin Alvarez,         Interval History:        Patient lying in chair.  No chest pain or palpitations.  Denies any shortness of breath.  No nausea vomiting.  No Abdominal pain.  Afebrile.  D/w cardiology CATALINA Sapp         Physical Exam:        Physical Exam   Temp:  [98.3  F (36.8  C)-98.8  F (37.1  C)] 98.8  F (37.1  C)  Pulse:  [] 119  Resp:  [16-18] 18  BP: ()/(59-85) 119/85  SpO2:  [94 %-100 %] 100 %    Intake/Output Summary (Last 24 hours) at 9/16/2022 0921  Last data filed at 9/16/2022 0734  Gross per 24 hour   Intake 250 ml    Output 2045 ml   Net -1795 ml       Admission Weight: 123.4 kg (272 lb)  Current Weight: 123.9 kg (273 lb 3.2 oz)    PHYSICAL EXAM  GENERAL: Patient is in no distress. Alert and oriented.  HEART: Regular rate and rhythm. S1S2. No murmurs  LUNGS: Bilateral clear breath sounds.  No wheezing or crackles.  Respirations unlabored  Abdomen soft nontender bowel sounds heard.  NEURO: Moving all extremities, no focal weakness  EXTREMITIES: No pedal edema. Left hip dressing intact.  SKIN: Warm, dry. No rash   PSYCHIATRY Cooperative       Medications:          aspirin  81 mg Oral Daily     atorvastatin  20 mg Oral Daily     buPROPion  150 mg Oral Daily     clopidogrel  75 mg Oral Daily     lisinopril  5 mg Oral Daily at 10 am     metoprolol succinate ER  12.5 mg Oral Daily     pantoprazole  40 mg Oral QAM AC     senna-docusate  1-2 tablet Oral BID     sodium chloride (PF)  3 mL Intracatheter Q8H     acetaminophen, calcium carbonate, Continuing ACE inhibitor/ARB/ARNI from home medication list OR ACE inhibitor/ARB order already placed during this visit, - MEDICATION INSTRUCTIONS -, - MEDICATION INSTRUCTIONS -, - MEDICATION INSTRUCTIONS -, hydrALAZINE, lidocaine 4%, lidocaine (buffered or not buffered), - MEDICATION INSTRUCTIONS -, methocarbamol, metoprolol, naloxone **OR** naloxone **OR** naloxone **OR** naloxone, nitroGLYcerin, ondansetron **OR** ondansetron, oxyCODONE **OR** [DISCONTINUED] oxyCODONE, Percutaneous Coronary Intervention orders placed (this is information for BPA alerting), sodium chloride (PF)         Data:      All new lab and imaging data was reviewed.

## 2022-09-19 NOTE — PROGRESS NOTES
Care Management Follow Up    Length of Stay (days): 7    Expected Discharge Date: 09/20/2022     Concerns to be Addressed:       Patient plan of care discussed at interdisciplinary rounds: Yes    Anticipated Discharge Disposition: Home     Anticipated Discharge Services:    Anticipated Discharge DME:      Patient/family educated on Medicare website which has current facility and service quality ratings:    Education Provided on the Discharge Plan:    Patient/Family in Agreement with the Plan:      Referrals Placed by CM/SW:    Private pay costs discussed: Not applicable    Additional Information:  [facility] is a [facility] patient. Writer called the [facility]- Spoke to Melania to inform them of the hospital admission at [phone].     Patient's authorization for coverage of hospital stay is: [id number]. Valid starting 9/12/22 for 30 days    JACI Hall, LGSW   Social Work   [facility]

## 2022-09-19 NOTE — PROGRESS NOTES
Care Management Follow Up    Length of Stay (days): 7    Expected Discharge Date: 09/20/2022     Concerns to be Addressed:       Patient plan of care discussed at interdisciplinary rounds: Yes    Anticipated Discharge Disposition: Home     Anticipated Discharge Services:    Anticipated Discharge DME:      Patient/family educated on Medicare website which has current facility and service quality ratings:    Education Provided on the Discharge Plan:    Patient/Family in Agreement with the Plan:      Referrals Placed by CM/SW:    Private pay costs discussed: Not applicable    Additional Information:  [facility] with JOHN Miles [facility] with [facility] primary MD Dr Payne.  Pt needing CV surgery appointment through [facility] and primary follow up for hospital stay.  Per Ginger, once discharge orders placed, fax them to [phone].  They will schedule patient's follow up with primary MD and arrange appointment with CV surgery and contact patient with those appointments.        Dyan Malave RN, BS  Care Coordinator  [email or web address]

## 2022-09-19 NOTE — CONSULTS
Care Management Initial Consult    General Information  Assessment completed with: Patient, Patient  Type of CM/SW Visit: Initial Assessment    Primary Care Provider verified and updated as needed: Yes   Readmission within the last 30 days:        Reason for Consult: discharge planning  Advance Care Planning: Advance Care Planning Reviewed:  (no ACP documents)          Communication Assessment  Patient's communication style: spoken language (English or Bilingual)    Hearing Difficulty or Deaf: no   Wear Glasses or Blind: yes (eyeglasses)    Cognitive  Cognitive/Neuro/Behavioral: WDL                      Living Environment:   People in home: spouse  [place]  Current living Arrangements: house      Able to return to prior arrangements: no       Family/Social Support:  Care provided by: self  Provides care for: no one  Marital Status:   Wife  [place]       Description of Support System:      Support Assessment: Adequate family and caregiver support, Adequate social supports    Current Resources:   Patient receiving home care services: No     Community Resources: None  Equipment currently used at home: crutches, walker, standard  Supplies currently used at home: None    Employment/Financial:  Employment Status:          Financial Concerns: No concerns identified   Referral to Financial Worker: No       Lifestyle & Psychosocial Needs:  Social Determinants of Health     Tobacco Use: Medium Risk     Smoking Tobacco Use: Former Smoker     Smokeless Tobacco Use: Former User   Alcohol Use: Not on file   Financial Resource Strain: Not on file   Food Insecurity: Not on file   Transportation Needs: Not on file   Physical Activity: Not on file   Stress: Not on file   Social Connections: Not on file   Intimate Partner Violence: Not on file   Depression: Not on file   Housing Stability: Not on file       Functional Status:  Prior to admission patient needed assistance:              Mental Health Status:          Chemical  Dependency Status:                Values/Beliefs:  Spiritual, Cultural Beliefs, [employer] Practices, Values that affect care: no               Additional Information:  Per care management/social work consult for discharge planning and ARU/TCU placement on discharge.  Patient was admitted on 9-12-22 with a stemi.  Patient was at [facility] undergoing an elective left hip replacement.  Patient began having chest pain and he was flown to [facility] and taken to the cath lab.  Reviewed the therapy discharge recommendations of ARU on discharge and patient is in agreement if he can go to a facility closer to home.  Patient has [facility] VA insurance.  Unsure if the [facility] has an ARU, will follow up with them on Monday.  Unsure if [facility] in [place] or [place] accept [facility] or if patient has to go to a VA contracted facility.  May have to follow up with the [facility] on Monday.  In the meantime, will send referrals, via discharge on the double, to check bed availability, at [facility] [place], [facility], and [facility].    Will continue to follow.      JACI Severino, [facility]    [phone]  [facility]

## 2022-09-19 NOTE — PLAN OF CARE
A&O x4. VSS on RA, EX bp soft last night and this morning, seems to be improving to low 100 systolic. Tele SR BBB. Up with 1/SBA, GBW. LS clear. BS+. BM+ today. Voiding. Incision R hip with dressing CDI, some ecchymosis below incision. R groin puncture site CDI. CMS intact. PRN tylenol, robaxin and oxycodone for pain, pt cautious with pain meds and trying to limit use.

## 2022-09-19 NOTE — PROGRESS NOTES
[facility]  Cardiology Progress Note  Date of Admission: 9/12/2022  Date of Service: 09/19/2022  Primary Cardiologist: Follows with [facility]    Assessment & Plan   Markie Rico is a 61 year old male with past medical history significant for CAD (remote PCI to LAD and RCA), HTN, HLP, obesity, osteoarthritis of bilateral hips emergently air lifted to [facility] ED and admitted on 9/12/2022 after elective total left hip replacement at [facility] for chest pain and was found to have an STEMI     Interval History:  No recurrent chest discomfort. Hemglobin stable on monotherapy.     Assessment:   1. Anterior STEMI post left total hip on 9/12/2022    Troponin >125 K    S/p angiogram with balloon angioplasty of the previous ostial to mid LAD stent Lcx and OM1 disease. Stable disease elsewhere.     It was recommended for consideration of possible CABG given MVD and multiple stent closure of the LAD     Plavix held due to anemia and remained on ASA only.     2. Ischemic cardiomyopathy    [PTA: lisinopril 10 mg daily, metoprolol XL 12.5 mg daily]    Echocardiogram (9/13/2022): LVEF 25-35%     Previously 35% in 2018 at [facility]    3. Coronary artery disease    [PTA: ASA and Plavix, rosuvastatin 40 mg daily]    History of PCI to LAD and RCA remotely.      Most recent angiogram is from 2018 where additional PCI to RCA was performed at outside hospital    4. Acute anemia    Likely from blood loss    Hgb 11 post angiogram dropped to 7.4 with thigh swelling    CTa noted no extravasation or hematoma and IR consulted that did not recommend intervention.    S/p 2 units of PRBCs     5. Aortic root dilation (4.6 cm) and ascending aortic dilation (3.7 cm)    Plan:  1. Restart DAPT with ASA and Plavix. If ortho elects to place him on DOAC, would recommend stopping ASA and remaining on Plavix and DOAC. After completes course of DOAC restart ASA if no bleeding concerns.  2. He will be establishing care with   [facility]ology for ongoing discussion regarding possible CABG.   3. Recommend hemoglobin in 1 week post discharge.     ANY SENA CNP  Pager:  [phone]     ATTESTATION:  Mr. Rico was seen and examined. Agree with note and mutually determined plan of care.  Reviewed meds and labs and vitals personally.  Restarting long-term plavix.  Plan CBC at the end of the week.  Sinus tachycardia from anemia and cardiomyopathy and habitus. No signs of PE.  LISA Jones MD     Physical Exam   Temp: 98.3  F (36.8  C) Temp src: Oral BP: 100/64 Pulse: 106   Resp: 18 SpO2: 100 % O2 Device: None (Room air)    Vitals:    09/16/22 1019 09/17/22 0531 09/19/22 0255   Weight: 123.6 kg (272 lb 6.4 oz) 124.2 kg (273 lb 12.8 oz) 123.9 kg (273 lb 3.2 oz)       Constitutional:   NAD    Skin:   Warm and dry   Head:   Nontraumatic   Neck:   no JVD   Lungs:   symmetric, clear to auscultation   Cardiovascular:   regular rate and rhythm   Abdomen:   Obese   Extremities and Back:   Right groin site ecchymotic    Neurological:   Grossly nonfocal     Medications     Continuing ACE inhibitor/ARB/ARNI from home medication list OR ACE inhibitor/ARB order already placed during this visit       - MEDICATION INSTRUCTIONS -       - MEDICATION INSTRUCTIONS -       - MEDICATION INSTRUCTIONS -       - MEDICATION INSTRUCTIONS -       Percutaneous Coronary Intervention orders placed (this is information for BPA alerting)         aspirin  81 mg Oral Daily     atorvastatin  20 mg Oral Daily     buPROPion  150 mg Oral Daily     lisinopril  5 mg Oral Daily at 10 am     metoprolol succinate ER  12.5 mg Oral Daily     pantoprazole  40 mg Oral QAM AC     senna-docusate  1-2 tablet Oral BID     sodium chloride (PF)  3 mL Intracatheter Q8H       Code Status    Full Code    Allergies   Allergies   Allergen Reactions     No Known Allergies      Orlistat      Other reaction(s): Incontinence of feces

## 2022-09-19 NOTE — CARE PLAN
Alert and oriented x4. VSS, though BP soft and is tachy up to 110's. Up SBa. Voiding well. Tolerating diet. Pain helped with oxycodone, tylenol and robaxin. Tele ST with BBB and PAC. Plavix restarted. Oxycodone increased to 5-10mg. Plan to continue to monitor tonight likely discharge tomorrow.

## 2022-09-19 NOTE — PLAN OF CARE
Goal Outcome Evaluation:  A&O x4, VSS on RA. Tele SR w/BBB+ PAC. Ambulates to bathroom with 1A/GB/W-reported improved strength compare to yesterday. L hip pain managed with prn oxycodone +Robaxin. CMS intact. BM x2. Denies N/V.Plans for discharge to ARU vs TCU, SW following.

## 2022-09-20 ENCOUNTER — APPOINTMENT (OUTPATIENT)
Dept: PHYSICAL THERAPY | Facility: CLINIC | Age: 62
DRG: 250 | End: 2022-09-20
Payer: COMMERCIAL

## 2022-09-20 VITALS
WEIGHT: 271.2 LBS | TEMPERATURE: 98.6 F | RESPIRATION RATE: 16 BRPM | OXYGEN SATURATION: 95 % | HEART RATE: 112 BPM | SYSTOLIC BLOOD PRESSURE: 108 MMHG | DIASTOLIC BLOOD PRESSURE: 69 MMHG | BODY MASS INDEX: 38.91 KG/M2

## 2022-09-20 LAB
ANION GAP SERPL CALCULATED.3IONS-SCNC: 5 MMOL/L (ref 3–14)
BUN SERPL-MCNC: 12 MG/DL (ref 7–30)
CALCIUM SERPL-MCNC: 8.5 MG/DL (ref 8.5–10.1)
CHLORIDE BLD-SCNC: 107 MMOL/L (ref 94–109)
CO2 SERPL-SCNC: 26 MMOL/L (ref 20–32)
CREAT SERPL-MCNC: 0.66 MG/DL (ref 0.66–1.25)
ERYTHROCYTE [DISTWIDTH] IN BLOOD BY AUTOMATED COUNT: 14.9 % (ref 10–15)
GFR SERPL CREATININE-BSD FRML MDRD: >90 ML/MIN/1.73M2
GLUCOSE BLD-MCNC: 116 MG/DL (ref 70–99)
HCT VFR BLD AUTO: 29 % (ref 40–53)
HGB BLD-MCNC: 9.1 G/DL (ref 13.3–17.7)
MAGNESIUM SERPL-MCNC: 2.6 MG/DL (ref 1.6–2.3)
MCH RBC QN AUTO: 29.1 PG (ref 26.5–33)
MCHC RBC AUTO-ENTMCNC: 31.4 G/DL (ref 31.5–36.5)
MCV RBC AUTO: 93 FL (ref 78–100)
PLATELET # BLD AUTO: 345 10E3/UL (ref 150–450)
POTASSIUM BLD-SCNC: 3.9 MMOL/L (ref 3.4–5.3)
RBC # BLD AUTO: 3.13 10E6/UL (ref 4.4–5.9)
SODIUM SERPL-SCNC: 138 MMOL/L (ref 133–144)
WBC # BLD AUTO: 10.1 10E3/UL (ref 4–11)

## 2022-09-20 PROCEDURE — 250N000013 HC RX MED GY IP 250 OP 250 PS 637: Performed by: INTERNAL MEDICINE

## 2022-09-20 PROCEDURE — 99231 SBSQ HOSP IP/OBS SF/LOW 25: CPT | Performed by: NURSE PRACTITIONER

## 2022-09-20 PROCEDURE — 83735 ASSAY OF MAGNESIUM: CPT | Performed by: HOSPITALIST

## 2022-09-20 PROCEDURE — 97530 THERAPEUTIC ACTIVITIES: CPT | Mod: GP | Performed by: PHYSICAL THERAPIST

## 2022-09-20 PROCEDURE — 85027 COMPLETE CBC AUTOMATED: CPT | Performed by: INTERNAL MEDICINE

## 2022-09-20 PROCEDURE — 250N000013 HC RX MED GY IP 250 OP 250 PS 637: Performed by: HOSPITALIST

## 2022-09-20 PROCEDURE — 36415 COLL VENOUS BLD VENIPUNCTURE: CPT | Performed by: INTERNAL MEDICINE

## 2022-09-20 PROCEDURE — 97116 GAIT TRAINING THERAPY: CPT | Mod: GP | Performed by: PHYSICAL THERAPIST

## 2022-09-20 PROCEDURE — 99239 HOSP IP/OBS DSCHRG MGMT >30: CPT | Performed by: HOSPITALIST

## 2022-09-20 PROCEDURE — 250N000013 HC RX MED GY IP 250 OP 250 PS 637: Performed by: NURSE PRACTITIONER

## 2022-09-20 PROCEDURE — 82310 ASSAY OF CALCIUM: CPT | Performed by: INTERNAL MEDICINE

## 2022-09-20 RX ORDER — LISINOPRIL 2.5 MG/1
2.5 TABLET ORAL
Status: DISCONTINUED | OUTPATIENT
Start: 2022-09-20 | End: 2022-09-20 | Stop reason: HOSPADM

## 2022-09-20 RX ORDER — ATORVASTATIN CALCIUM 20 MG/1
20 TABLET, FILM COATED ORAL DAILY
Qty: 30 TABLET | Refills: 0 | Status: SHIPPED | OUTPATIENT
Start: 2022-09-21

## 2022-09-20 RX ORDER — CLOPIDOGREL BISULFATE 75 MG/1
75 TABLET ORAL DAILY
Qty: 30 TABLET | Refills: 0 | Status: SHIPPED | OUTPATIENT
Start: 2022-09-20

## 2022-09-20 RX ORDER — NITROGLYCERIN 0.4 MG/1
TABLET SUBLINGUAL
Qty: 25 TABLET | Refills: 0 | Status: SHIPPED | OUTPATIENT
Start: 2022-09-20

## 2022-09-20 RX ORDER — METHOCARBAMOL 500 MG/1
500 TABLET, FILM COATED ORAL 3 TIMES DAILY PRN
Qty: 90 TABLET | Refills: 0 | Status: SHIPPED | OUTPATIENT
Start: 2022-09-20

## 2022-09-20 RX ORDER — METOPROLOL SUCCINATE 25 MG/1
12.5 TABLET, EXTENDED RELEASE ORAL DAILY
Qty: 15 TABLET | Refills: 0 | Status: SHIPPED | OUTPATIENT
Start: 2022-09-21 | End: 2022-10-21

## 2022-09-20 RX ORDER — LISINOPRIL 2.5 MG/1
2.5 TABLET ORAL DAILY
Qty: 30 TABLET | Refills: 0 | Status: SHIPPED | OUTPATIENT
Start: 2022-09-20 | End: 2022-11-02

## 2022-09-20 RX ORDER — OXYCODONE HYDROCHLORIDE 5 MG/1
5-10 TABLET ORAL EVERY 4 HOURS PRN
Qty: 40 TABLET | Refills: 0 | Status: SHIPPED | OUTPATIENT
Start: 2022-09-20 | End: 2023-02-15

## 2022-09-20 RX ADMIN — OXYCODONE HYDROCHLORIDE 10 MG: 5 TABLET ORAL at 03:42

## 2022-09-20 RX ADMIN — PANTOPRAZOLE SODIUM 40 MG: 40 TABLET, DELAYED RELEASE ORAL at 08:11

## 2022-09-20 RX ADMIN — OXYCODONE HYDROCHLORIDE 10 MG: 5 TABLET ORAL at 08:11

## 2022-09-20 RX ADMIN — OXYCODONE HYDROCHLORIDE 10 MG: 5 TABLET ORAL at 12:10

## 2022-09-20 RX ADMIN — CLOPIDOGREL BISULFATE 75 MG: 75 TABLET ORAL at 08:11

## 2022-09-20 RX ADMIN — METHOCARBAMOL 500 MG: 500 TABLET ORAL at 12:10

## 2022-09-20 RX ADMIN — LISINOPRIL 2.5 MG: 2.5 TABLET ORAL at 09:52

## 2022-09-20 RX ADMIN — ASPIRIN 81 MG: 81 TABLET, COATED ORAL at 08:11

## 2022-09-20 RX ADMIN — ATORVASTATIN CALCIUM 20 MG: 20 TABLET, FILM COATED ORAL at 08:11

## 2022-09-20 RX ADMIN — SENNOSIDES AND DOCUSATE SODIUM 2 TABLET: 50; 8.6 TABLET ORAL at 08:11

## 2022-09-20 RX ADMIN — ACETAMINOPHEN 650 MG: 325 TABLET ORAL at 03:42

## 2022-09-20 RX ADMIN — BUPROPION HYDROCHLORIDE 150 MG: 150 TABLET, EXTENDED RELEASE ORAL at 08:11

## 2022-09-20 ASSESSMENT — ACTIVITIES OF DAILY LIVING (ADL)
ADLS_ACUITY_SCORE: 45

## 2022-09-20 NOTE — PLAN OF CARE
Physical Therapy Discharge Summary    Reason for therapy discharge:    Discharged to home with outpatient therapy.    Progress towards therapy goal(s). See goals on Care Plan in [facility] electronic health record for goal details.  Goals partially met.  Barriers to achieving goals:   discharge from facility.    Therapy recommendation(s):    Continued therapy is recommended.  Rationale/Recommendations:  Patient would benefit from continued therapy. Has cardiac rehab needs and PT needs as patient recently had both left MARIMAR and an MI. .    Goal Outcome Evaluation:

## 2022-09-20 NOTE — PLAN OF CARE
Pt here with chest pain. A&Ox4. Neuros and CMS intact. VSS. Tele SB with BBB. 2g NA diet. Up with SBA. Hip pain managed with tylenol, oxycodone, &  robaxin. Pt scoring green on the Aggression Stop Light Tool. [facility] [place]. Discharge home on 9/20.

## 2022-09-20 NOTE — DISCHARGE SUMMARY
[facility]  Hospitalist Discharge Summary      Date of Admission:  9/12/2022  Date of Discharge:  9/20/2022  Discharging Provider: Justin Alvarez DO  Discharge Service: Hospitalist Service    Discharge Diagnoses   STEMI postoperatively after right hip total arthroplasty.  Ischemic Cardiomyopathy (LVEF 25- 35%) on echo 9/13/22  History of PCI to LAD and RCA remotely.  Coronary artery disease.  HTN, HLD  S/P Left MARIMAR at [facility]  on 9/12/2022    Follow-ups Needed After Discharge   Follow-up Appointments     Follow-up and recommended labs and tests       Follow up with primary care provider, [facility], within   7 days to evaluate medication change and for hospital follow- up.  The   following labs/tests are recommended: complete blood count of Friday.              Unresulted Labs Ordered in the Past 30 Days of this Admission     No orders found from 8/13/2022 to 9/13/2022.      These results will be followed up by PCP    Discharge Disposition   Discharged to home  Condition at discharge: Stable      Hospital Course   STEMI postoperatively after right hip total arthroplasty.  Ischemic Cardiomyopathy (LVEF 25- 35%) on echo 9/13/22  History of PCI to LAD and RCA remotely.  Coronary artery disease.  HTN, HLD  Work-up in the ED revealed anterior STEMI.  Troponin peaked to > 125k  Underwent emergent balloon angioplasty of his LAD on 9/12.  Echo with reduced EF of 25 to 30%.  Mid to distal anterior, anteroseptal hypokinesis.  No significant valve disease  plan  - Cardiology following, appreciate input, [facility]  - continue aspirin, restarted plavix on 9/19, stable cbc, repeat cbc on Friday  - Continue low-dose lisinopril, metoprolol with hold parameters.    - Continue Lipitor, decrease dose to 20 mg oral daily.  LDL 54, HDL 48.  Total cholesterol 119.  - Continue cardiac rehabilitation as outpatient  [facility]  possible need [facility]G.      S/P Left MARIMAR at [facility]  on 9/12/2022  Underwent left MARIMAR on 9/12 at [place] and immediately began to have chest pain in the PACU.  Spinal anesthesia with EBL of 650 mL.   Orthopedic surgery following.  Follow-up in clinic after discharge.  Appreciate input.  Continue pain medication as needed oxycodone, Tylenol.  Advised narcotic use given soft blood pressures.  Defer pharmacological DVT prophylaxis to orthopedic team. (On aspirin)  PT, OT following, planning on discharge home with asisstance as his functional state did improve  Care coordinator assistance with transition    Sinus tachycardia  Likely from postoperative state including worse anemia, pain  No cardiopulmonary complaints  Plan  - monitor, his BP meds have been readjusted     Acute anemia likely from blood loss, dilutional.  Thrombocytopenia improved.  In the immediate postoperative period hemoglobin around 11.    Post angiogram hemoglobin dropped to 7.4 on 9/14. Noted to have left thigh swelling.   CTA no identified area of arterial extravasation or hematoma.  Interventional radiology followed-no IR procedure recommended.    Received 1 unit blood transfusion [9/15], hemoglobin stable greater than 8 for the last few days.  Monitor periodically.     Transaminitis likely due to ischemia.  AST peaked to 593, trending down  Avoid hepatotoxic drugs.  Monitor.     Postop elevated leukocytosis likely reactive,  Trend WBC count, fever curve.     Dilated aortic root   Echo dilated aortic root measuring 4.6 cm,  ascending aorta measures 3.7. This may be overestimated. Consider CT for  further evalaution of aortic root/ascendin aorta measurements as outpatient.  Optimize blood pressures     Hypoalbuminemia likely dilutional.  Serum albumin 2.2.  Monitor in AM.     Hyperglycemia.  Hemoglobin A1c 5.5.  Monitor      Medically complicated obesity with a BMI of 39.09.  Possible obstructive sleep apnea  Consider lifestyle  modification with diet and exercise as able to.  Consider sleep studies as outpatient.     Orders Placed This Encounter      Low Saturated Fat Na <2400 mg    Consultations This Hospital Stay   CARDIOLOGY IP CONSULT  NUTRITION SERVICES ADULT IP CONSULT  CARDIAC REHAB IP CONSULT  PHARMACY IP CONSULT  CARDIOLOGY IP CONSULT  ORTHOPEDIC SURGERY IP CONSULT  PHYSICAL THERAPY ADULT IP CONSULT  CARDIOLOGY IP CONSULT  INTERVENTIONAL RADIOLOGY ADULT/PEDS IP CONSULT  CARE MANAGEMENT / SOCIAL WORK IP CONSULT  OCCUPATIONAL THERAPY ADULT IP CONSULT  PHYSICAL THERAPY ADULT IP CONSULT  SMOKING CESSATION PROGRAM IP CONSULT    Code Status   Full Code    Time Spent on this Encounter   I, Justin Alvarez DO, personally saw the patient today and spent greater than 30 minutes discharging this patient.       Justin Alvarez DO  [facility]  [address], [address]  [place] [address]  Phone: [phone]  ______________________________________________________________________    Physical Exam   Vital Signs: Temp: 98.6  F (37  C) Temp src: Oral BP: 125/81 Pulse: 118   Resp: 16 SpO2: 95 % O2 Device: None (Room air)    Weight: 271 lbs 3.2 oz        Justin Alvarez DO   Physician   Hospitalist   Progress Notes      Signed   Date of Service:  9/19/2022  3:25 PM   Creation Time:  9/19/2022  3:25 PM                      []Hide copied text    []Naomy for details    [facility]  Hospitalist Progress Note   09/19/2022          Assessment and Plan:        Markie Rico is a 61 year old male with coronary artery disease, hypertension, hyperlipidemia, medically complicated obesity, osteoarthritis of bilateral hips emergently air lifted to the ED at [facility] on 9/12. Underwent elective total left hip replacement in [place] on 9/12/22, chest pain in the immediate postoperative period.     STEMI postoperatively after right hip total arthroplasty.  Ischemic Cardiomyopathy  (LVEF 25- 35%) on echo 9/13/22  History of PCI to LAD and RCA remotely.  Coronary artery disease.  HTN, HLD  Work-up in the ED revealed anterior STEMI.  Troponin peaked to > 125k  Underwent emergent balloon angioplasty of his LAD on 9/12.  Echo with reduced EF of 25 to 30%.  Mid to distal anterior, anteroseptal hypokinesis.  No significant valve disease  plan  - Cardiology following, appreciate input.  - continue aspirin, restarted plavix on 9/19  - Continue low-dose lisinopril, metoprolol with hold parameters.    - Continue Lipitor, decrease dose to 20 mg oral daily.  LDL 54, HDL 48.  Total cholesterol 119.  - Continue telemetry monitoring.  - Continue cardiac rehabilitation.  [facility] of possible need [facility].      S/P Left MARIMAR at [facility]  on 9/12/2022  Underwent left MARIMAR on 9/12 at [place] and immediately began to have chest pain in the PACU.  Spinal anesthesia with EBL of 650 mL.   Orthopedic surgery following.  Follow-up in clinic after discharge.  Appreciate input.  Continue pain medication as needed oxycodone, Tylenol.  Advised narcotic use given soft blood pressures.  Defer pharmacological DVT prophylaxis to orthopedic team. (On aspirin)  PT, OT following, planning on discharge home with asisstance  Care coordinator assistance with transition     Acute anemia likely from blood loss, dilutional.  Thrombocytopenia improved.  In the immediate postoperative period hemoglobin around 11.    Post angiogram hemoglobin dropped to 7.4 on 9/14. Noted to have left thigh swelling.   CTA no identified area of arterial extravasation or hematoma.  Interventional radiology followed-no IR procedure recommended.    Received 1 unit blood transfusion [9/15], hemoglobin stable greater than 8 for the last few days.  Monitor periodically.     Transaminitis likely due to ischemia.  AST peaked to 593, trending down  Avoid hepatotoxic drugs.  Monitor.     Postop elevated leukocytosis  likely reactive  Trend WBC count, fever curve.     Dilated aortic root   Echo dilated aortic root measuring 4.6 cm,  ascending aorta measures 3.7. This may be overestimated. Consider CT for  further evalaution of aortic root/ascendin aorta measurements as outpatient.  Optimize blood pressures     Hypoalbuminemia likely dilutional.  Serum albumin 2.2.  Monitor in AM.     Hyperglycemia.  Hemoglobin A1c 5.5.  Monitor      Medically complicated obesity with a BMI of 39.09.  Possible obstructive sleep apnea  Consider lifestyle modification with diet and exercise as able to.  Consider sleep studies as outpatient.     Orders Placed This Encounter      Low Saturated Fat Na <2400 mg      DVT Prophylaxis: SCDs, ambulate.  Code Status: Full Code  Disposition: Expected discharge pending safe discharge plan in place.     Discussed with patient, chelsie from cardiology     Justin Alvarez DO         Interval History:         Patient lying in chair.  No chest pain or palpitations.  Denies any shortness of breath.  No nausea vomiting.  No Abdominal pain.  Afebrile.  D/w cardiology CATALINA Sapp          Physical Exam:            Physical Exam     Temp:  [98.3  F (36.8  C)-98.8  F (37.1  C)] 98.8  F (37.1  C)  Pulse:  [] 119  Resp:  [16-18] 18  BP: ()/(59-85) 119/85  SpO2:  [94 %-100 %] 100 %     Intake/Output Summary (Last 24 hours) at 9/16/2022 0921  Last data filed at 9/16/2022 0734      Gross per 24 hour   Intake 250 ml   Output 2045 ml   Net -1795 ml         Admission Weight: 123.4 kg (272 lb)  Current Weight: 123.9 kg (273 lb 3.2 oz)     PHYSICAL EXAM  GENERAL: Patient is in no distress. Alert and oriented.  HEART: Regular rate and rhythm. S1S2. No murmurs  LUNGS: Bilateral clear breath sounds.  No wheezing or crackles.  Respirations unlabored  Abdomen soft nontender bowel sounds heard.  NEURO: Moving all extremities, no focal weakness  EXTREMITIES: No pedal edema. Left hip dressing intact.  SKIN: Warm, dry. No rash    PSYCHIATRY Cooperative       Medications:           aspirin  81 mg Oral Daily     atorvastatin  20 mg Oral Daily     buPROPion  150 mg Oral Daily     clopidogrel  75 mg Oral Daily     lisinopril  5 mg Oral Daily at 10 am     metoprolol succinate ER  12.5 mg Oral Daily     pantoprazole  40 mg Oral QAM AC     senna-docusate  1-2 tablet Oral BID     sodium chloride (PF)  3 mL Intracatheter Q8H      acetaminophen, calcium carbonate, Continuing ACE inhibitor/ARB/ARNI from home medication list OR ACE inhibitor/ARB order already placed during this visit, - MEDICATION INSTRUCTIONS -, - MEDICATION INSTRUCTIONS -, - MEDICATION INSTRUCTIONS -, hydrALAZINE, lidocaine 4%, lidocaine (buffered or not buffered), - MEDICATION INSTRUCTIONS -, methocarbamol, metoprolol, naloxone **OR** naloxone **OR** naloxone **OR** naloxone, nitroGLYcerin, ondansetron **OR** ondansetron, oxyCODONE **OR** [DISCONTINUED] oxyCODONE, Percutaneous Coronary Intervention orders placed (this is information for BPA alerting), sodium chloride (PF)          Data:      All new lab and imaging data was reviewed.                                                  Primary Care Physician   [facility]    Discharge Orders      CARDIAC REHAB REFERRAL      Cardiovascular Surgery Consult  Referral      Brief Discharge Instructions    Do NOT stop your aspirin or platelet inhibitor unless directed by your Cardiologist.  These medications help to prevent platelets in your blood from sticking together and forming a clot.  Examples of these medications are:  Ticagrelor (Brilinta), Clopidigrel (Plavix), Prasugrel (Effient)     When to call - Contact the Heart Clinic    You may experience symptoms that require follow-up before your scheduled appointment. Contact the Heart Clinic if you develop: Fever over 100.4o Fahrenheit, that lasts more than one day; Redness, heat, or pus at the puncture site; Change in color or temperature in your groin or leg.      When to call - Reasons to Call 911    If your groin starts to bleed or begins to swell suddenly after leaving the hospital, lie flat and apply firm pressure just above the puncture site for 15 minutes.  If bleeding continues, call 9-1-1.     Precautions - Lifting    NO lifting of more than 10 pounds for at least 3 days.  If you usually lift 50 pounds or more daily, talk with your Cardiologist.     Precautions - Household Activities    Avoid any hard work or tiring activities.  NO physical activity such as mowing the lawn, raking, vacuuming, changing sheets on your bed, snow shoveling, or using a .     Precautions - Active Sports Activities    Avoid any tiring sports activities.  This includes, yard work, jogging, biking, bowling, swimming, tennis or golf, and sexual activity.     Precautions - Elective Dental Work    NO elective dental work for 6 weeks after receiving a stent.     Comfort and Pain Management - Pain after Surgery    Pain after surgery is normal and expected.  Your leg may be sore or stiff for a few days, and your pain will improve with time. You may take Tylenol or a pain medicine recommended by your Cardiologist.     Comfort and Pain Management - Bruising after Surgery    Bruising around the groin area is normal.  It may take 2-3 weeks for this to go away.  It is normal for the bruised area to turn green and/or yellow as it is healing.  A small lump may also be present and may last 2-3 months.     Activity - Daily Walking    During the day get up and walk around every 2 hours.     Activity - Light Household Activities    Light household activities are ok.     Activity - Elevate Legs    Elevate legs in between all activities.     Activity - Cardiac Rehab    You are encouraged to enroll in an Outpatient Cardiac Rehab program after discharge from the hospital.  Our Cardiac Rehab staff may visit briefly with you while you're in the hospital.  If they miss you, someone will contact you  after you are home.     Return to Driving    Driving is NOT permitted for 24 hours after surgery     Return to work    You may return to work after 72 hours if you are feeling well and your job does not involve heavy lifting.     Dressing Removal    You may take off the dressing on your groin the day after your procedure.     Incision Care    Keep the incision area dry and clean.  You do not need to use a bandage on your incision.     Shower / Bathing    It is ok to shower with regular soap. Pat dry, do not rub. No tub bath for 3 days. No swimming in a pool or hot tub immersion for 1 week     Reason aspirin not prescribed from this order set    Discharge meds as per inpatient team     Reason Lipid Lowering Medications not prescribed from this order set    Discharge meds as per inpatient team     Reason for your hospital stay    Heart attack after orthopedic surgery     Follow-up and recommended labs and tests     Follow up with primary care provider, [facility], within 7 days to evaluate medication change and for hospital follow- up.  The following labs/tests are recommended: complete blood count of Friday.     Activity    Your activity upon discharge: activity as tolerated     Diet    Follow this diet upon discharge: Orders Placed This Encounter      Low Saturated Fat Na <2400 mg       Significant Results and Procedures   Most Recent 3 CBC's:Recent Labs   Lab Test 09/20/22  0616 09/19/22  0638 09/18/22  0611   WBC 10.1 10.6 10.2   HGB 9.1* 9.2* 9.6*   MCV 93 92 91    324 249     Most Recent 3 BMP's:Recent Labs   Lab Test 09/20/22  0616 09/19/22  0638 09/18/22  0611    139 139   POTASSIUM 3.9 3.9 3.9   CHLORIDE 107 105 109   CO2 26 27 26   BUN 12 14 13   CR 0.66 0.60* 0.53*   ANIONGAP 5 7 4   ASHLEY 8.5 8.5 8.3*   * 117* 110*     Most Recent 2 LFT's:Recent Labs   Lab Test 09/19/22  0638 09/18/22  0611   AST 28 39   ALT 61 71*   ALKPHOS 71 68   BILITOTAL 0.7 1.0   ,   Results for  orders placed or performed during the hospital encounter of 22   CTA Abdomen Pelvis with Contrast    Narrative    EXAM: CT ARTERIOGRAPHY OF THE ABDOMEN AND PELVIS    TECHNIQUE: Multiphase helical acquisition through the abdomen and  pelvis was performed during the arteriographic phase after the  administration of 80 mL Isovue-370 intravenous contrast. 2D and 3D  reconstructions were performed by the CT technologist. Dose reduction  techniques were used.    INDICATION: Blood loss anemia, decreasing hemoglobin following left  hip surgery on 912.    COMPARISON: No pertinent comparison study is available for review.     ARTERIAL FINDINGS:   No identified area of arterial extravasation.    Moderate amount of atheromatous disease involving a nonaneurysmal  abdominal aorta. The celiac, superior mesenteric, inferior mesenteric  and renal arteries are widely patent. The iliac and proximal thigh  femoral arterial vasculature is widely patent.    NON-ARTERIAL FINDINGS:  Subcentimeter low-attenuation lesion in the left hepatic lobe, too  small to accurately characterize on radiographic CT imaging (series 3  image 18).    Advanced degenerative change involving the lower thoracic and lumbar  spine. Postoperative change of bilateral total hip arthroplasties.  Small amount of subcutaneous gas along the left hip and flank,  presumed postoperative.      Impression    IMPRESSION:  1.  No identified area of arterial extravasation or hematoma.  2.  Additional incidental findings as above.    CAM RUSSELL MD         SYSTEM ID:  [id number]   Echocardiogram Complete     Value    LVEF  25-30%    Narrative    [id number]  [id number]  [id number]  [id number]^RENAN^LIZZ     [facility]  Echocardiography Laboratory  [address] [place] [address]     Name: JESSICA PHILLIPS  MRN: [id number]  : 1960  Study Date: 2022 07:47 AM  Age: 61 yrs  Gender: Male  Patient Location: [facility]  Reason For Study: CAD, Myocardial  Infarction  Ordering Physician: LIZZ URRUTIA  Performed By: Terrie Hall     BSA: 2.4 m2  Height: 70 in  Weight: 272 lb  HR: 122  BP: 108/77 mmHg  ______________________________________________________________________________  Procedure  Complete Portable Echo Adult. Optison (NDC #[id number]) given intravenously.  ______________________________________________________________________________  Interpretation Summary     1. Technically difficult study despite the use of contrast to enhance  endocardial definition.  2. Left ventricular systolic function is severely reduced. The visual ejection  fraction is 25-30%.  3. Mid to distal anterior, anteroseptal hypokinesis, apex appears akinetic.  4. Valves are not well visualized; no gross valvular pathology identified.  5. Aortic root is poorly visualized; dilated aortic root measuring 4.6 cm,  ascending aorta measures 3.7. This may be overestimated. Consider CT for  further evalaution of aortic root/ascendin aorta measurements.  ______________________________________________________________________________  Left Ventricle  The left ventricle is normal in size. The visual ejection fraction is 25-30%.  Left ventricular systolic function is severely reduced. Mid to distal  anterior, anteroseptal hypokinesis, apex appears akinetic.     Right Ventricle  The right ventricle is grossly normal size. The right ventricle is not well  visualized. The right ventricular systolic function is normal.     Atria  Normal left atrial size. Right atrial size is normal. There is no color  Doppler evidence of an atrial shunt.     Mitral Valve  The mitral valve is normal in structure and function.     Tricuspid Valve  The tricuspid valve is not well visualized. The tricuspid valve is not well  visualized, but is grossly normal.     Aortic Valve  The aortic valve is not well visualized.     Pulmonic Valve  The pulmonic valve is not well seen, but is grossly normal.     Vessels  The descending aorta  is Mildly dilated.     Pericardium  There is no pericardial effusion.     ______________________________________________________________________________  MMode/2D Measurements & Calculations  Ao root diam: 4.6 cm  asc Aorta Diam: 3.7 cm  LA Volume (BP): 43.7 ml     LA Volume Index (BP): 18.4 ml/m2     Doppler Measurements & Calculations  LV V1 max PG: 3.3 mmHg  LV V1 max: 90.2 cm/sec  LV V1 VTI: 13.4 cm  PA acc time: 0.14 sec     ______________________________________________________________________________  Report approved by: Ashley Sanchez 09/13/2022 11:57 AM         Cardiac Catheterization    Narrative      Ost Cx lesion is 70% stenosed.    Prox RCA lesion is 20% stenosed.    Mid RCA lesion is 40% stenosed.    Dist RCA lesion is 40% stenosed.    2nd Mrg-1 lesion is 70% stenosed.    2nd Mrg-2 lesion is 50% stenosed.    Ost LAD to Mid LAD lesion is 100% stenosed.     1. 2 vessel coronary artery disease including  of previously placed   [id number] LAD stent LCx/OM1 disease.  RCA with mild diffuse disease.  2. Status post successful PCI of [id number] LAD  with balloon   angioplasty. Pt will likely need to be assessed for CABG given multivessel   CAD and multiple stent closure-POBA only of this Lad. Viability study may   be indicated  3. Closure of right femoral arteriotomy with 6F Angioseal.            Discharge Medications   Current Discharge Medication List      START taking these medications    Details   aspirin (ASA) 81 MG EC tablet Take 1 tablet (81 mg) by mouth daily  Qty: 30 tablet, Refills: 0    Associated Diagnoses: Coronary artery disease involving native coronary artery of native heart without angina pectoris      atorvastatin (LIPITOR) 20 MG tablet Take 1 tablet (20 mg) by mouth daily  Qty: 30 tablet, Refills: 0    Associated Diagnoses: Coronary artery disease involving native coronary artery of native heart without angina pectoris      methocarbamol (ROBAXIN) 500 MG tablet Take 1  tablet (500 mg) by mouth 3 times daily as needed for muscle spasms  Qty: 90 tablet, Refills: 0    Associated Diagnoses: Coronary artery disease involving native coronary artery of native heart without angina pectoris         CONTINUE these medications which have CHANGED    Details   clopidogrel (PLAVIX) 75 MG tablet Take 1 tablet (75 mg) by mouth daily  Qty: 30 tablet, Refills: 0    Associated Diagnoses: Coronary artery disease involving native coronary artery of native heart without angina pectoris      lisinopril (ZESTRIL) 2.5 MG tablet Take 1 tablet (2.5 mg) by mouth daily  Qty: 30 tablet, Refills: 0    Associated Diagnoses: Coronary artery disease involving native coronary artery of native heart without angina pectoris      metoprolol succinate ER (TOPROL XL) 25 MG 24 hr tablet Take 0.5 tablets (12.5 mg) by mouth daily for 30 days  Qty: 15 tablet, Refills: 0    Associated Diagnoses: Coronary artery disease involving native coronary artery of native heart without angina pectoris      nitroGLYcerin (NITROSTAT) 0.4 MG sublingual tablet For chest pain place 1 tablet under the tongue every 5 minutes for 3 doses. If symptoms persist 5 minutes after 1st dose call 911.  Qty: 25 tablet, Refills: 0    Associated Diagnoses: Coronary artery disease involving native coronary artery of native heart without angina pectoris      oxyCODONE (ROXICODONE) 5 MG tablet Take 1-2 tablets (5-10 mg) by mouth every 4 hours as needed for moderate to severe pain  Qty: 40 tablet, Refills: 0    Associated Diagnoses: Coronary artery disease involving native coronary artery of native heart without angina pectoris         CONTINUE these medications which have NOT CHANGED    Details   acetaminophen (TYLENOL) 325 MG tablet Take 2 tablets (650 mg) by mouth every 6 hours as needed for other (mild pain)  Qty: 100 tablet, Refills: 0    Associated Diagnoses: Status post total hip replacement, left      buPROPion (WELLBUTRIN SR) 150 MG 12 hr tablet Take  150 mg by mouth daily      Cinnamon Bark POWD       MAGNESIUM OXIDE PO Take 500 mg by mouth daily      Multiple Vitamins-Minerals (CENTRUM MEN PO) Take 1 tablet by mouth daily      SEMAGLUTIDE, 1 MG/DOSE, SC Inject 1 mg Subcutaneous once a week Tuesdays      TURMERIC PO       order for DME Equipment being ordered: Walker ()  Treatment Diagnosis: s/p joint replacement  Qty: 1 Device, Refills: 0    Associated Diagnoses: Status post total replacement of right hip      senna-docusate (SENOKOT-S/PERICOLACE) 8.6-50 MG tablet Take 1-2 tablets by mouth 2 times daily Take while on oral narcotics to prevent or treat constipation.  Qty: 30 tablet, Refills: 0    Comments: While taking narcotics  Associated Diagnoses: Status post total hip replacement, left         STOP taking these medications       aspirin (ASA) 81 MG chewable tablet Comments:   Reason for Stopping:         POTASSIUM CHLORIDE CR PO Comments:   Reason for Stopping:         rivaroxaban ANTICOAGULANT (XARELTO) 10 MG TABS tablet Comments:   Reason for Stopping:         rosuvastatin (CRESTOR) 40 MG tablet Comments:   Reason for Stopping:         traMADol (ULTRAM) 50 MG tablet Comments:   Reason for Stopping:             Allergies   Allergies   Allergen Reactions     No Known Allergies      Orlistat      Other reaction(s): Incontinence of feces

## 2022-09-20 NOTE — PLAN OF CARE
A&O x4. Up w/ SBA. Tele: ST w BBB. Given oxycodone for hip pain. Aquacel in place on L hip. R Groin site C/D/I. Voiding. Tolerating diet. AVS and Rx reviewed with patient. Discussed BP parameters. Discharge to home.

## 2022-09-20 NOTE — PROGRESS NOTES
[facility]  Cardiology Progress Note  Date of Admission: 9/12/2022  Date of Service: 09/20/2022  Primary Cardiologist: Follows with [facility]    Assessment & Plan   Markie Rico is a 61 year old male with past medical history significant for CAD (remote PCI to LAD and RCA), HTN, HLP, obesity, osteoarthritis of bilateral hips emergently air lifted to [facility] ED and admitted on 9/12/2022 after elective total left hip replacement at [facility] for chest pain and was found to have an STEMI     Interval History:  Hemoglobin stable on DAPT    Assessment:   1. Anterior STEMI post left total hip on 9/12/2022    Troponin >125 K    S/p angiogram with balloon angioplasty of the previous ostial to mid LAD stent Lcx and OM1 disease. Stable disease elsewhere.     It was recommended for consideration of possible CABG given MVD and multiple stent closure of the LAD     Plavix held due to anemia and remained on ASA only.     2. Ischemic cardiomyopathy    [PTA: lisinopril 10 mg daily, metoprolol XL 12.5 mg daily]    Echocardiogram (9/13/2022): LVEF 25-35%     BP soft and was tachycardiac (ST) likely due to post op volume shift, anemia and pain.    Previously 35% in 2018 at [facility]    3. Coronary artery disease    [PTA: ASA and Plavix, rosuvastatin 40 mg daily]    History of PCI to LAD and RCA remotely.      Most recent angiogram is from 2018 where additional PCI to RCA was performed at outside hospital    4. Acute anemia    Likely from blood loss    Hgb 11 post angiogram dropped to 7.4 with thigh swelling    Hgb 9.6-9.2-9.1    CTa noted no extravasation or hematoma and IR consulted that did not recommend intervention.    S/p 2 units of PRBCs     5. Aortic root dilation (4.6 cm) and ascending aortic dilation (3.7 cm)    Plan:  1. Continue DAPT with Hgb in 1 week post discharge   2. He will be establishing care with [facility] cardiology for ongoing discussion regarding possible CABG.     BRUCE CROCKER  ANY FARMER CNP  Pager:  [phone]     ATTESTATION:  Agree with note and mutually determined plan of care.   LISA Jones MD     Physical Exam   Temp: 98.6  F (37  C) Temp src: Oral BP: 96/68 Pulse: 75   Resp: 16 SpO2: 95 % O2 Device: None (Room air)    Vitals:    09/17/22 0531 09/19/22 0255 09/20/22 0706   Weight: 124.2 kg (273 lb 12.8 oz) 123.9 kg (273 lb 3.2 oz) 123 kg (271 lb 3.2 oz)       Constitutional:   NAD    Skin:   Warm and dry   Head:   Nontraumatic   Neck:   no JVD   Lungs:   symmetric, clear to auscultation   Cardiovascular:   regular rate and rhythm   Abdomen:   Obese   Extremities and Back:   Right groin site ecchymotic    Neurological:   Grossly nonfocal     Medications     Continuing ACE inhibitor/ARB/ARNI from home medication list OR ACE inhibitor/ARB order already placed during this visit       - MEDICATION INSTRUCTIONS -       - MEDICATION INSTRUCTIONS -       - MEDICATION INSTRUCTIONS -       - MEDICATION INSTRUCTIONS -       Percutaneous Coronary Intervention orders placed (this is information for BPA alerting)         aspirin  81 mg Oral Daily     atorvastatin  20 mg Oral Daily     buPROPion  150 mg Oral Daily     clopidogrel  75 mg Oral Daily     lisinopril  2.5 mg Oral Daily at 10 am     metoprolol succinate ER  12.5 mg Oral Daily     pantoprazole  40 mg Oral QAM AC     senna-docusate  1-2 tablet Oral BID     sodium chloride (PF)  3 mL Intracatheter Q8H       Code Status    Full Code    Allergies   Allergies   Allergen Reactions     No Known Allergies      Orlistat      Other reaction(s): Incontinence of feces

## 2022-09-22 ENCOUNTER — PATIENT OUTREACH (OUTPATIENT)
Dept: CARE COORDINATION | Facility: CLINIC | Age: 62
End: 2022-09-22

## 2022-09-22 ENCOUNTER — TELEPHONE (OUTPATIENT)
Dept: CARDIOLOGY | Facility: CLINIC | Age: 62
End: 2022-09-22

## 2022-09-22 NOTE — PROGRESS NOTES
Connected Care Resource Center Contact  [facility]/Voicemail     Clinical Data: Transitional Care Management Outreach     Outreach attempted x 2.  Left message on patient's voicemail, providing [facility]'s 24/7 scheduling and nurse triage phone number [phone] ([phone]) for questions/concerns and/or to schedule an appt with an [facility] provider, if they do not have a PCP.      Plan:  [facility] will do no further outreaches at this time.       Magali Casey  Connected [facility] Resource Center, [facility]    *Connected Care Resource Team does NOT follow patient ongoing. Referrals are identified based on internal discharge reports and the outreach is to ensure patient has an understanding of their discharge instructions.

## 2022-09-22 NOTE — TELEPHONE ENCOUNTER
Patient was admitted to 9/12/22 after emergently air lifted from [facility] for chest pain and NSTEMI after having elective total left hip replacement.    PMH: CAD (remote PCI to LAD and RCA), HTN, HLP, obesity, osteoarthritis of bilateral hips.     Echo showed EF of 25-35% (previously was 35% at [facility]). Aortic root dilation (4.6 cm) and ascending aortic dilation (3.7 cm).    9/12/22: Coronary angiogram via RFA s/p balloon angioplasty of the previous ostial to mid LAD stent Lcx and OM1 disease. Stable disease elsewhere. It was recommended for consideration of possible CABG given MVD and multiple stent closure of the LAD.     Acute anemia, likely from blood loss. Hgb was 11 post angiogram dropped to 7.4 with thigh swelling s/p 2 units of PRBC's. Hgb 9.6-9.2-9.1. CTa noted no extravasation or hematoma and IR consulted that did not recommend intervention.    Plavix held due to anemia, but restarted on 9/19/22. ASA continued. Started on Lipitor, Robaxin, PTA KCL, Xarelto, Crestor and Ultran were discontinued at time of discharge. Hold parameters provided for PTA Metoprolol and Lisinopril.    Called patient to discuss any post hospital d/c questions, review medication changes, and confirm f/u appts, but no answer. VM left to return my phone call.     RN will confirm with patient that he is to be scheduled for follow up OV [facility] [place].     Cardiac rehab is scheduled on 9/27/22 at [facility] in [place]. SCOTT Fisher RN.

## 2022-09-26 NOTE — TELEPHONE ENCOUNTER
Second attempt at trying to contact pt, but no answer and VM box is full. Pt has not returned my prior VM as below. Will try back at a later date. SCOTT Fisher RN.

## 2022-09-29 ENCOUNTER — OFFICE VISIT (OUTPATIENT)
Dept: ORTHOPEDICS | Facility: CLINIC | Age: 62
End: 2022-09-29
Payer: COMMERCIAL

## 2022-09-29 VITALS — HEART RATE: 80 BPM | OXYGEN SATURATION: 99 % | SYSTOLIC BLOOD PRESSURE: 130 MMHG | DIASTOLIC BLOOD PRESSURE: 77 MMHG

## 2022-09-29 DIAGNOSIS — Z96.642 STATUS POST TOTAL REPLACEMENT OF LEFT HIP: ICD-10-CM

## 2022-09-29 DIAGNOSIS — G47.00 INSOMNIA, UNSPECIFIED TYPE: Primary | ICD-10-CM

## 2022-09-29 PROCEDURE — 99024 POSTOP FOLLOW-UP VISIT: CPT | Performed by: ORTHOPAEDIC SURGERY

## 2022-09-29 RX ORDER — TRAZODONE HYDROCHLORIDE 50 MG/1
50 TABLET, FILM COATED ORAL AT BEDTIME
Qty: 30 TABLET | Refills: 1 | Status: SHIPPED | OUTPATIENT
Start: 2022-09-29

## 2022-09-29 ASSESSMENT — PAIN SCALES - GENERAL: PAINLEVEL: MODERATE PAIN (4)

## 2022-09-29 NOTE — PROGRESS NOTES
"SUBJECTIVE:  Markie Rico is here for postoperative follow up status post left total hip arthroplasty on 9/12, complicated by postoperative MI. He was in the CCU at [facility] for close to a week.. had transfusions for blood loss anemia. Negative CT angio for a bleed.  It has been approximately 2.5 weeks since the procedure.   Back on his Plavix + ASA    He is very happy with his hip pain relief. And says he's able to bear weight on the left side \"for the first time in 5 years\"    Review of Systems:  Constitutional/General: Negative for fever, chills, change in weight  Integumentary/Skin: Negative for worrisome rashes, moles, or lesions  Neuro: Negative for weakness, dizziness, or paresthesias   Psychiatric: negative for changes in mood or affect    OBJECTIVE:  Physical Exam:  /77 (BP Location: Left arm, Patient Position: Standing, Cuff Size: Adult Large)   Pulse 80   SpO2 99%   General Appearance: healthy, alert and no distress   Skin: no suspicious lesions or rashes  Neuro: Normal strength and tone, mentation intact and speech normal  Vascular: good pulses, and cappillary refill   Lymph: no lymphadenopathy   Psych:  mentation appears normal and affect normal/bright  Resp: no increased work of breathing    Left Hip Exam:  Inspection: Wound looks good, no evidence of infection.  Thigh swelling has decreased   Calf non-tender     ASSESSMENT:   Doing well status post left total hip arthroplasty  He is deconditioned from lack of use of the left leg for years and the extended bedrest postoperative.      PLAN:   Sutures removed today. Continue with current restrictions.    Declined physical therapy, will work on home exercise program on his own.  Return to clinic in 4 weeks with x-rays of the low AP pelvis and lateral right hip.    PEPE Briseno MD  Dept. Orthopedic Surgery  [facility]    "

## 2022-09-29 NOTE — LETTER
"    9/29/2022         RE: Markie Rico  [address] [id number]        Dear Colleague,    Thank you for referring your patient, Markie Rico, to the [facility]. Please see a copy of my visit note below.    SUBJECTIVE:  Markie Rico is here for postoperative follow up status post left total hip arthroplasty on 9/12, complicated by postoperative MI. He was in the CCU at [facility] for close to a week.. had transfusions for blood loss anemia. Negative CT angio for a bleed.  It has been approximately 2.5 weeks since the procedure.   Back on his Plavix + ASA    He is very happy with his hip pain relief. And says he's able to bear weight on the left side \"for the first time in 5 years\"    Review of Systems:  Constitutional/General: Negative for fever, chills, change in weight  Integumentary/Skin: Negative for worrisome rashes, moles, or lesions  Neuro: Negative for weakness, dizziness, or paresthesias   Psychiatric: negative for changes in mood or affect    OBJECTIVE:  Physical Exam:  /77 (BP Location: Left arm, Patient Position: Standing, Cuff Size: Adult Large)   Pulse 80   SpO2 99%   General Appearance: healthy, alert and no distress   Skin: no suspicious lesions or rashes  Neuro: Normal strength and tone, mentation intact and speech normal  Vascular: good pulses, and cappillary refill   Lymph: no lymphadenopathy   Psych:  mentation appears normal and affect normal/bright  Resp: no increased work of breathing    Left Hip Exam:  Inspection: Wound looks good, no evidence of infection.  Thigh swelling has decreased   Calf non-tender     ASSESSMENT:   Doing well status post left total hip arthroplasty  He is deconditioned from lack of use of the left leg for years and the extended bedrest postoperative.      PLAN:   Sutures removed today. Continue with current restrictions.    Declined physical therapy, will work on home exercise program on his own.  Return to clinic in 4 weeks " with x-rays of the low AP pelvis and lateral right hip.    PEPE Briseno MD  Dept. Orthopedic Surgery  [facility] [place]        Again, thank you for allowing me to participate in the care of your patient.        Sincerely,        Vineet Briseno MD

## 2022-10-02 ENCOUNTER — APPOINTMENT (OUTPATIENT)
Dept: CT IMAGING | Facility: CLINIC | Age: 62
End: 2022-10-02
Attending: FAMILY MEDICINE
Payer: COMMERCIAL

## 2022-10-02 ENCOUNTER — HOSPITAL ENCOUNTER (EMERGENCY)
Facility: CLINIC | Age: 62
Discharge: HOME | End: 2022-10-03
Attending: FAMILY MEDICINE | Admitting: FAMILY MEDICINE
Payer: COMMERCIAL

## 2022-10-02 DIAGNOSIS — I50.21 ACUTE SYSTOLIC CONGESTIVE HEART FAILURE (H): ICD-10-CM

## 2022-10-02 DIAGNOSIS — R06.02 SHORTNESS OF BREATH: ICD-10-CM

## 2022-10-02 LAB
ALBUMIN SERPL-MCNC: 3 G/DL (ref 3.4–5)
ALP SERPL-CCNC: 108 U/L (ref 40–150)
ALT SERPL W P-5'-P-CCNC: 19 U/L (ref 0–70)
ANION GAP SERPL CALCULATED.3IONS-SCNC: 6 MMOL/L (ref 3–14)
AST SERPL W P-5'-P-CCNC: 20 U/L (ref 0–45)
BASOPHILS # BLD AUTO: 0 10E3/UL (ref 0–0.2)
BASOPHILS NFR BLD AUTO: 0 %
BILIRUB SERPL-MCNC: 0.5 MG/DL (ref 0.2–1.3)
BUN SERPL-MCNC: 13 MG/DL (ref 7–30)
CALCIUM SERPL-MCNC: 8.7 MG/DL (ref 8.5–10.1)
CHLORIDE BLD-SCNC: 112 MMOL/L (ref 94–109)
CO2 SERPL-SCNC: 26 MMOL/L (ref 20–32)
CREAT SERPL-MCNC: 0.73 MG/DL (ref 0.66–1.25)
D DIMER PPP FEU-MCNC: 3.98 UG/ML FEU (ref 0–0.5)
EOSINOPHIL # BLD AUTO: 0.1 10E3/UL (ref 0–0.7)
EOSINOPHIL NFR BLD AUTO: 1 %
ERYTHROCYTE [DISTWIDTH] IN BLOOD BY AUTOMATED COUNT: 16.3 % (ref 10–15)
GFR SERPL CREATININE-BSD FRML MDRD: >90 ML/MIN/1.73M2
GLUCOSE BLD-MCNC: 108 MG/DL (ref 70–99)
HCT VFR BLD AUTO: 31.7 % (ref 40–53)
HGB BLD-MCNC: 10.1 G/DL (ref 13.3–17.7)
IMM GRANULOCYTES # BLD: 0 10E3/UL
IMM GRANULOCYTES NFR BLD: 0 %
LYMPHOCYTES # BLD AUTO: 1 10E3/UL (ref 0.8–5.3)
LYMPHOCYTES NFR BLD AUTO: 11 %
MCH RBC QN AUTO: 29.9 PG (ref 26.5–33)
MCHC RBC AUTO-ENTMCNC: 31.9 G/DL (ref 31.5–36.5)
MCV RBC AUTO: 94 FL (ref 78–100)
MONOCYTES # BLD AUTO: 0.8 10E3/UL (ref 0–1.3)
MONOCYTES NFR BLD AUTO: 8 %
NEUTROPHILS # BLD AUTO: 7.7 10E3/UL (ref 1.6–8.3)
NEUTROPHILS NFR BLD AUTO: 80 %
NRBC # BLD AUTO: 0 10E3/UL
NRBC BLD AUTO-RTO: 0 /100
NT-PROBNP SERPL-MCNC: 5561 PG/ML (ref 0–900)
PLATELET # BLD AUTO: 343 10E3/UL (ref 150–450)
POTASSIUM BLD-SCNC: 4.4 MMOL/L (ref 3.4–5.3)
PROT SERPL-MCNC: 6.8 G/DL (ref 6.8–8.8)
RBC # BLD AUTO: 3.38 10E6/UL (ref 4.4–5.9)
SODIUM SERPL-SCNC: 144 MMOL/L (ref 133–144)
TROPONIN I SERPL HS-MCNC: 103 NG/L
WBC # BLD AUTO: 9.7 10E3/UL (ref 4–11)

## 2022-10-02 PROCEDURE — 85025 COMPLETE CBC W/AUTO DIFF WBC: CPT | Performed by: FAMILY MEDICINE

## 2022-10-02 PROCEDURE — 99285 EMERGENCY DEPT VISIT HI MDM: CPT | Mod: CS,25 | Performed by: FAMILY MEDICINE

## 2022-10-02 PROCEDURE — 93010 ELECTROCARDIOGRAM REPORT: CPT | Performed by: FAMILY MEDICINE

## 2022-10-02 PROCEDURE — 83880 ASSAY OF NATRIURETIC PEPTIDE: CPT | Performed by: FAMILY MEDICINE

## 2022-10-02 PROCEDURE — 80053 COMPREHEN METABOLIC PANEL: CPT | Performed by: FAMILY MEDICINE

## 2022-10-02 PROCEDURE — 85379 FIBRIN DEGRADATION QUANT: CPT | Performed by: FAMILY MEDICINE

## 2022-10-02 PROCEDURE — 250N000009 HC RX 250: Performed by: FAMILY MEDICINE

## 2022-10-02 PROCEDURE — C9803 HOPD COVID-19 SPEC COLLECT: HCPCS | Performed by: FAMILY MEDICINE

## 2022-10-02 PROCEDURE — 71275 CT ANGIOGRAPHY CHEST: CPT

## 2022-10-02 PROCEDURE — 99285 EMERGENCY DEPT VISIT HI MDM: CPT | Mod: CS | Performed by: FAMILY MEDICINE

## 2022-10-02 PROCEDURE — 84484 ASSAY OF TROPONIN QUANT: CPT | Performed by: FAMILY MEDICINE

## 2022-10-02 PROCEDURE — 250N000011 HC RX IP 250 OP 636: Performed by: FAMILY MEDICINE

## 2022-10-02 PROCEDURE — 96374 THER/PROPH/DIAG INJ IV PUSH: CPT | Mod: 59 | Performed by: FAMILY MEDICINE

## 2022-10-02 PROCEDURE — 93005 ELECTROCARDIOGRAM TRACING: CPT | Performed by: FAMILY MEDICINE

## 2022-10-02 PROCEDURE — 36415 COLL VENOUS BLD VENIPUNCTURE: CPT | Performed by: FAMILY MEDICINE

## 2022-10-02 RX ORDER — IOPAMIDOL 755 MG/ML
100 INJECTION, SOLUTION INTRAVASCULAR ONCE
Status: COMPLETED | OUTPATIENT
Start: 2022-10-02 | End: 2022-10-02

## 2022-10-02 RX ADMIN — IOPAMIDOL 75 ML: 755 INJECTION, SOLUTION INTRAVENOUS at 23:23

## 2022-10-02 RX ADMIN — SODIUM CHLORIDE 70 ML: 9 INJECTION, SOLUTION INTRAVENOUS at 23:23

## 2022-10-02 ASSESSMENT — ACTIVITIES OF DAILY LIVING (ADL): ADLS_ACUITY_SCORE: 35

## 2022-10-03 VITALS
HEART RATE: 87 BPM | DIASTOLIC BLOOD PRESSURE: 61 MMHG | SYSTOLIC BLOOD PRESSURE: 110 MMHG | OXYGEN SATURATION: 96 % | TEMPERATURE: 99 F | RESPIRATION RATE: 21 BRPM | BODY MASS INDEX: 38.02 KG/M2 | WEIGHT: 265 LBS

## 2022-10-03 LAB
SARS-COV-2 RNA RESP QL NAA+PROBE: NEGATIVE
TROPONIN I SERPL HS-MCNC: 89 NG/L

## 2022-10-03 PROCEDURE — 87635 SARS-COV-2 COVID-19 AMP PRB: CPT | Performed by: FAMILY MEDICINE

## 2022-10-03 PROCEDURE — 250N000011 HC RX IP 250 OP 636: Performed by: FAMILY MEDICINE

## 2022-10-03 PROCEDURE — 36415 COLL VENOUS BLD VENIPUNCTURE: CPT | Performed by: FAMILY MEDICINE

## 2022-10-03 PROCEDURE — 84484 ASSAY OF TROPONIN QUANT: CPT | Performed by: FAMILY MEDICINE

## 2022-10-03 PROCEDURE — C9803 HOPD COVID-19 SPEC COLLECT: HCPCS | Performed by: FAMILY MEDICINE

## 2022-10-03 RX ORDER — FUROSEMIDE 10 MG/ML
10 SOLUTION ORAL
Qty: 10 ML | Refills: 0 | Status: SHIPPED | OUTPATIENT
Start: 2022-10-03 | End: 2022-10-03

## 2022-10-03 RX ORDER — FUROSEMIDE 10 MG/ML
10 INJECTION INTRAMUSCULAR; INTRAVENOUS ONCE
Status: COMPLETED | OUTPATIENT
Start: 2022-10-03 | End: 2022-10-03

## 2022-10-03 RX ORDER — FUROSEMIDE 10 MG/ML
10 SOLUTION ORAL
Qty: 10 ML | Refills: 0 | Status: SHIPPED | OUTPATIENT
Start: 2022-10-03

## 2022-10-03 RX ADMIN — FUROSEMIDE 10 MG: 10 INJECTION, SOLUTION INTRAVENOUS at 01:51

## 2022-10-03 ASSESSMENT — ACTIVITIES OF DAILY LIVING (ADL): ADLS_ACUITY_SCORE: 35

## 2022-10-03 NOTE — DISCHARGE INSTRUCTIONS
Your symptoms are most likely due to congestive heart failure related to your recent heart attack and decreased pumping of the heart.  Please see the attached handout  Take Lasix 10 mg twice a day for the next 5 days.  Take your potassium supplement when you take your Lasix.  This should help remove some fluid from the lungs.  Hold the Lasix if your blood pressure is too low or you are not having dizziness.  Follow-up with your primary care provider in 3 to 4 days for recheck.  Return to the emergency department at any time if you develop new or worsening symptoms.

## 2022-10-03 NOTE — ED TRIAGE NOTES
Patient with cough and pain in chest with deep breathing only. Had hip surgery 3 weeks ago and had an MI immediately after.      Triage Assessment     Row Name 10/02/22 3022       Triage Assessment (Adult)    Airway WDL WDL       Respiratory WDL    Respiratory WDL WDL       Cardiac WDL    Cardiac WDL WDL

## 2022-10-03 NOTE — ED NOTES
Attempted to discharge pt as he stated brother was able to transport home. Pt not able to reach brother on cell phone and his vehicle not in emergency department parking area as stated. Pt continuing to attempt calling brother at this time. Discharge currently on hold.

## 2022-10-03 NOTE — ED PROVIDER NOTES
[facility] ED Provider Note   Patient: Markie Rico  MRN #:  [id number]  Date of Visit: October 2, 2022    CC:     Chief Complaint   Patient presents with     Cough     HPI:  Markie Rico is a 61 year old male who presented to the emergency department with mild chest discomfort associated with breathing.  Symptoms have been occurring for the last 3 days, with slight intermittent pressure across the lower posterior chest but also anteriorly.  He does not have any fever, chills, productive cough, abdominal pain, changes in bowel habits, or any increase in leg swelling.  Patient had a left hip total arthroplasty 3 weeks ago.  He developed an acute ST elevation MI immediately following the surgery, and had angioplasty of an occluded stent.  He has since been on aspirin and Plavix.  He has ischemic cardiomyopathy based on his most recent echocardiogram.  Patient states that he has had 4 previous heart attacks, and for coronary stents.  He had a fair amount of blood loss after surgery, and is still anemic.  Patient has not noticed any unilateral calf pain or ankle swelling, and has no personal history of DVT or PE.  Patient reports that this pain is different than his pain when he had his heart attack.  His symptoms seem slightly better this evening, as he does not experience it standing.  He is very comfortable laying in bed right now.  He has been sleeping in a recliner or stiff back chair which is uncomfortable.    Problem List:  Patient Active Problem List    Diagnosis Date Noted     Acute ST elevation myocardial infarction (STEMI) (H) 09/12/2022     Priority: Medium     Primary osteoarthritis of left hip 06/20/2019     Priority: Medium     Status post total replacement of right hip 02/12/2018     Priority: Medium     Benign essential hypertension 11/30/2017     Priority: Medium     Primary osteoarthritis of both hips 11/30/2017     Priority: Medium      History of acute myocardial infarction of inferior wall 11/23/2012     Priority: Medium     Hyperlipidemia LDL goal <100 11/23/2012     Priority: Medium     Morbid obesity (H) 11/23/2012     Priority: Medium     Multiple joint pain 11/23/2012     Priority: Medium     CAD (coronary artery disease) 11/23/2012     Priority: Medium     Medication side effect 11/23/2012     Priority: Medium     lisinopril cough possibly         Past Medical History:   Diagnosis Date     Diverticulitis      Old myocardial infarction      Old myocardial infarction 06/07/11     Other and unspecified hyperlipidemia      Tobacco use disorder        MEDS: furosemide (LASIX) 10 MG/ML solution  acetaminophen (TYLENOL) 325 MG tablet  aspirin (ASA) 81 MG EC tablet  atorvastatin (LIPITOR) 20 MG tablet  buPROPion (WELLBUTRIN SR) 150 MG 12 hr tablet  Cinnamon Bark POWD  clopidogrel (PLAVIX) 75 MG tablet  lisinopril (ZESTRIL) 2.5 MG tablet  MAGNESIUM OXIDE PO  methocarbamol (ROBAXIN) 500 MG tablet  metoprolol succinate ER (TOPROL XL) 25 MG 24 hr tablet  Multiple Vitamins-Minerals (CENTRUM MEN PO)  nitroGLYcerin (NITROSTAT) 0.4 MG sublingual tablet  order for DME  oxyCODONE (ROXICODONE) 5 MG tablet  SEMAGLUTIDE, 1 MG/DOSE, SC  senna-docusate (SENOKOT-S/PERICOLACE) 8.6-50 MG tablet  traZODone (DESYREL) 50 MG tablet  TURMERIC PO        ALLERGIES:    Allergies   Allergen Reactions     No Known Allergies      Orlistat      Other reaction(s): Incontinence of feces       Past Surgical History:   Procedure Laterality Date     ARTHROPLASTY HIP Right 2/12/2018    Procedure: ARTHROPLASTY HIP;  right total hip arthroplasty;  Surgeon: Vineet Bedolla MD;  Location:  OR     ARTHROPLASTY HIP Left 9/12/2022    Procedure: LEFT TOTAL HIP ARTHROPLASTY;  Surgeon: Vineet Briseno MD;  Location: [facility] OR     COLONOSCOPY N/A 5/20/2022    Procedure: COLONOSCOPY;  Surgeon: Mundo Velazquez MD;  Location:  GI     CV CORONARY ANGIOGRAM N/A 9/12/2022     Procedure: Coronary Angiogram;  Surgeon: Js Brady MD;  Location:  HEART CARDIAC CATH LAB     CV PCI N/A 2022    Procedure: Percutaneous Coronary Intervention;  Surgeon: Js Brady MD;  Location: [facility] CARDIAC CATH LAB     ZC ANESTH,DX ARTHROSCOPIC PROC KNEE JOINT       Z COLOSTOMY       [facility] UNLISTED LAPAROSCOPY PROC,INTESTINE  2003    Exploratory Lap. Lysis of adhesions. Sigmoid colostomy takedown and stapled coloproctostomy.     [facility]CA SINGLE VESSEL      2 stents       Social History     Tobacco Use     Smoking status: Former Smoker     Quit date: 2000     Years since quittin.7     Smokeless tobacco: Former User   Vaping Use     Vaping Use: Never used   Substance Use Topics     Alcohol use: Not Currently     Comment: Occasional     Drug use: Not Currently         Review of Systems   Except as noted in HPI, all other systems were reviewed and are negative    Physical Exam     Vitals were reviewed  Patient Vitals for the past 12 hrs:   BP Temp Temp src Pulse Resp SpO2 Weight   10/03/22 0040 90/56 -- -- 85 11 98 % --   10/03/22 0015 94/46 -- -- 86 25 97 % --   10/03/22 0000 102/60 -- -- 85 24 97 % --   10/02/22 2345 111/61 -- -- 86 -- 99 % --   10/02/22 2300 99/53 -- -- 88 19 96 % --   10/02/22 2200 109/67 -- -- 92 21 94 % --   10/02/22 2113 113/73 99  F (37.2  C) Oral 100 20 95 % 120.2 kg (265 lb)     GENERAL APPEARANCE: Alert and oriented x3, no acute distress  FACE: normal facies  EYES: Pupils are equal  HENT: normal external exam  NECK: no adenopathy or asymmetry  RESP: normal respiratory effort; clear breath sounds bilaterally  CV: regular rate and rhythm; no significant murmurs, gallops or rubs  ABD: soft, obese, no tenderness; no rebound or guarding; bowel sounds are normal  MS: no gross deformities noted; normal muscle tone.  EXT: No calf tenderness; trace to 1+ edema in the left leg, on the side of his total hip arthroplasty.  Patient reports decreased swelling  overall in that leg  SKIN: no worrisome rash  NEURO: no facial droop; no focal deficits, speech is normal  PSYCH: normal mood and affect      Available Lab/Imaging Results     Results for orders placed or performed during the hospital encounter of 10/02/22 (from the past 24 hour(s))   CBC with platelets differential    Narrative    The following orders were created for panel order CBC with platelets differential.  Procedure                               Abnormality         Status                     ---------                               -----------         ------                     CBC with platelets and d...[[id number]]  Abnormal            Final result                 Please view results for these tests on the individual orders.   D dimer quantitative   Result Value Ref Range    D-Dimer Quantitative 3.98 (H) 0.00 - 0.50 ug/mL FEU    Narrative    This D-dimer assay is intended for use in conjunction with a clinical pretest probability assessment model to exclude pulmonary embolism (PE) and deep venous thrombosis (DVT) in outpatients suspected of PE or DVT. The cut-off value is 0.50 ug/mL FEU.   Comprehensive metabolic panel   Result Value Ref Range    Sodium 144 133 - 144 mmol/L    Potassium 4.4 3.4 - 5.3 mmol/L    Chloride 112 (H) 94 - 109 mmol/L    Carbon Dioxide (CO2) 26 20 - 32 mmol/L    Anion Gap 6 3 - 14 mmol/L    Urea Nitrogen 13 7 - 30 mg/dL    Creatinine 0.73 0.66 - 1.25 mg/dL    Calcium 8.7 8.5 - 10.1 mg/dL    Glucose 108 (H) 70 - 99 mg/dL    Alkaline Phosphatase 108 40 - 150 U/L    AST 20 0 - 45 U/L    ALT 19 0 - 70 U/L    Protein Total 6.8 6.8 - 8.8 g/dL    Albumin 3.0 (L) 3.4 - 5.0 g/dL    Bilirubin Total 0.5 0.2 - 1.3 mg/dL    GFR Estimate >90 >60 mL/min/1.73m2   Troponin I   Result Value Ref Range    Troponin I High Sensitivity 103 (H) <79 ng/L   Nt probnp inpatient (BNP)   Result Value Ref Range    N terminal Pro BNP Inpatient 5,561 (H) 0 - 900 pg/mL   CBC with platelets and differential   Result  Value Ref Range    WBC Count 9.7 4.0 - 11.0 10e3/uL    RBC Count 3.38 (L) 4.40 - 5.90 10e6/uL    Hemoglobin 10.1 (L) 13.3 - 17.7 g/dL    Hematocrit 31.7 (L) 40.0 - 53.0 %    MCV 94 78 - 100 fL    MCH 29.9 26.5 - 33.0 pg    MCHC 31.9 31.5 - 36.5 g/dL    RDW 16.3 (H) 10.0 - 15.0 %    Platelet Count 343 150 - 450 10e3/uL    % Neutrophils 80 %    % Lymphocytes 11 %    % Monocytes 8 %    % Eosinophils 1 %    % Basophils 0 %    % Immature Granulocytes 0 %    NRBCs per 100 WBC 0 <1 /100    Absolute Neutrophils 7.7 1.6 - 8.3 10e3/uL    Absolute Lymphocytes 1.0 0.8 - 5.3 10e3/uL    Absolute Monocytes 0.8 0.0 - 1.3 10e3/uL    Absolute Eosinophils 0.1 0.0 - 0.7 10e3/uL    Absolute Basophils 0.0 0.0 - 0.2 10e3/uL    Absolute Immature Granulocytes 0.0 <=0.4 10e3/uL    Absolute NRBCs 0.0 10e3/uL   CT Chest Pulmonary Embolism w Contrast    Narrative    EXAM: CT CHEST PULMONARY EMBOLISM W CONTRAST  LOCATION: [facility]  DATE/TIME: 10/2/2022 11:46 PM    INDICATION: Pleuritic chest pain and elevated d-dimer after recent hip replacement.  COMPARISON: None.  TECHNIQUE: CT chest pulmonary angiogram during arterial phase injection of IV contrast. Multiplanar reformats and MIP reconstructions were performed. Dose reduction techniques were used.   CONTRAST: 75 mL Isovue 370    FINDINGS:  ANGIOGRAM CHEST: Pulmonary arteries are normal caliber and negative for pulmonary emboli. Thoracic aorta is not well opacified and is indeterminate for dissection. No CT evidence of right heart strain.    LUNGS AND PLEURA: Scattered patchy bilateral groundglass opacities throughout both lungs, predominantly in a central distribution, most pronounced in the right upper lobe. Trace bilateral pleural effusions. No pneumothorax.    MEDIASTINUM/AXILLAE: No lymphadenopathy. No pericardial effusion.    CORONARY ARTERY CALCIFICATION: Previous intervention (stents or CABG).    UPPER ABDOMEN: Small calcifications in the inferior right  hepatic lobe, likely calcified granulomas.    MUSCULOSKELETAL: Multilevel degenerative changes of the spine. Degenerative changes of the bilateral shoulder joints with multiple intra-articular bodies.      Impression    IMPRESSION:  1.  No acute pulmonary embolism.    2.  Scattered bilateral groundglass opacities, either multifocal atypical pneumonia versus mild pulmonary edema. (COVID-19 Indeterminate: Imaging features can be seen with COVID-19 pneumonia, though are nonspecific and can occur with a variety of   infectious and noninfectious processes.)    3.  Trace bilateral pleural effusions.   Symptomatic; Unknown COVID-19 Virus (Coronavirus) by PCR Nasopharyngeal    Specimen: Nasopharyngeal; Swab   Result Value Ref Range    SARS CoV2 PCR Negative Negative    Narrative    Testing was performed using the do  SARS-CoV-2 & Influenza A/B Assay on the do  [employer]  System.  This test should be ordered for the detection of SARS-COV-2 in individuals who meet SARS-CoV-2 clinical and/or epidemiological criteria. Test performance is unknown in asymptomatic patients.  This test is for in vitro diagnostic use under the FDA EUA for laboratories certified under CLIA to perform moderate and/or high complexity testing. This test has not been FDA cleared or approved.  A negative test does not rule out the presence of PCR inhibitors in the specimen or target RNA in concentration below the limit of detection for the assay. The possibility of a false negative should be considered if the patient's recent exposure or clinical presentation suggests COVID-19.  [facility] are certified under the Clinical Laboratory Improvement Amendments of 1988 (CLIA-88) as qualified to perform moderate and/or high complexity laboratory testing.   Troponin I (now)   Result Value Ref Range    Troponin I High Sensitivity 89 (H) <79 ng/L       EKG reviewed by me: Normal sinus rhythm with heart rate of 94.  IA interval of 160 ms, QT  interval of 344 ms, QRS duration of 130 ms, normal axis.  Q waves noted in leads V1 through V6.  There is right bundle branch block.  Nonspecific T wave abnormality.       Impression     Final diagnoses:   Shortness of breath   Acute systolic congestive heart failure (H)         ED Course & Medical Decision Making   Markie Rico is a 61 year old male who presented to the emergency department with chest congestion and some difficulty breathing with mild shortness of breath.  Patient notices some increased labored breathing at the lower lungs both in the front and back.  He had a recent postoperative ST elevation myocardial infarction with troponin levels in the range of greater than 125,000..  His echocardiogram after his angioplasty revealed an ejection fraction of only 25%.  Patient does not have any accompanying chest pain but had never had this difficulty breathing before.  He did not have fever, or chills but has a slight nonproductive cough.  Patient was seen shortly after arrival.  Temperature is 99 degrees, blood pressure 113/73 with lowest blood pressure of 90/58.  Heart rate of 100 initially normalizing to 87.  Respiratory rate is between 11 and 20 with 95% oxygen saturation.  Lungs were clear without any wheezes.  Patient had some left leg swelling after his total hip arthroplasty and reported that the swelling had decreased.  He did not have any focal calf tenderness.  EKG revealed anterior infarct, but no acute ST changes.  Laboratory work-up reveals a white blood count of 9.7, hemoglobin of 10.1, platelet count of 343,000 with normal differential.  Comprehensive metabolic panel was normal except for nonfasting glucose of 108 troponin is 103, and BNP is 5561.  D-dimer is markedly elevated at 3.98.  CT scan of the chest with contrast was performed to rule out PE given his recent surgery.  This showed no acute pulmonary embolism.  There is scattered bilateral groundglass opacities either multifocal  atypical pneumonia versus mild pulmonary edema.  COVID-19 pneumonia can have this appearance.  The patient's SARS-CoV-2 PCR test is negative.  The patient's second troponin enzyme was 89.  Patient's symptoms are most likely due to decompensated congestive heart failure with reduced EF from his most recent echocardiogram.  Patient received 10 mg IV Lasix dose in the ED, and you will continue with Lasix low-dose twice daily to help with further diuresis.  He has potassium supplements that he can take while he is on Lasix.  Hold if his blood pressure gets too low or he is symptomatic with dizziness.  Monitor for any signs of fever as we cannot rule out early pneumonia.  Patient expressed understanding and agreement with discharge instructions below.  He [facility] but does not have a ride and will need to reschedule.  Patient is stable for discharge home.  See discharge instructions below.      Written after-visit summary and instructions were given at the time of discharge.    Follow up Plan:   [facility]  [address] [address]    In 3 days      [facility] Emergency Dept  [address] [place] [address] [phone]    If symptoms worsen      Discharge Instructions:   Your symptoms are most likely due to congestive heart failure related to your recent heart attack and decreased pumping of the heart.  Please see the attached handout  Take Lasix 10 mg twice a day for the next 5 days.  Take your potassium supplement when you take your Lasix.  This should help remove some fluid from the lungs.  Hold the Lasix if your blood pressure is too low or you are not having dizziness.  Follow-up with your primary care provider in 3 to 4 days for recheck.  Return to the emergency department at any time if you develop new or worsening symptoms.       Disclaimer: This note consists of words and symbols  derived from keyboarding and dictation using voice recognition software.  As a result, there may be errors that have gone undetected.  Please consider this when interpreting information found in this note.       Cheryl Hannah MD  10/03/22 0208

## 2022-10-06 NOTE — TELEPHONE ENCOUNTER
Third and final attempt to try and contact pt, but VM box remains full. Will close this encounter. SCOTT Fisher RN.

## 2022-11-02 ENCOUNTER — OFFICE VISIT (OUTPATIENT)
Dept: CARDIOLOGY | Facility: CLINIC | Age: 62
End: 2022-11-02
Payer: COMMERCIAL

## 2022-11-02 VITALS
SYSTOLIC BLOOD PRESSURE: 126 MMHG | BODY MASS INDEX: 36.78 KG/M2 | OXYGEN SATURATION: 97 % | DIASTOLIC BLOOD PRESSURE: 78 MMHG | HEART RATE: 110 BPM | WEIGHT: 256.3 LBS

## 2022-11-02 DIAGNOSIS — I25.5 ISCHEMIC CARDIOMYOPATHY: Primary | ICD-10-CM

## 2022-11-02 DIAGNOSIS — I25.10 CORONARY ARTERY DISEASE INVOLVING NATIVE CORONARY ARTERY OF NATIVE HEART WITHOUT ANGINA PECTORIS: ICD-10-CM

## 2022-11-02 PROCEDURE — 99417 PROLNG OP E/M EACH 15 MIN: CPT | Performed by: INTERNAL MEDICINE

## 2022-11-02 PROCEDURE — 99215 OFFICE O/P EST HI 40 MIN: CPT | Performed by: INTERNAL MEDICINE

## 2022-11-02 RX ORDER — METOPROLOL SUCCINATE 25 MG/1
12.5 TABLET, EXTENDED RELEASE ORAL DAILY
Qty: 30 TABLET | Refills: 4 | Status: SHIPPED | OUTPATIENT
Start: 2022-11-02 | End: 2023-02-15

## 2022-11-02 RX ORDER — LISINOPRIL 5 MG/1
5 TABLET ORAL DAILY
Qty: 90 TABLET | Refills: 3 | Status: SHIPPED | OUTPATIENT
Start: 2022-11-02 | End: 2023-02-15 | Stop reason: ALTCHOICE

## 2022-11-02 NOTE — LETTER
11/2/2022    [facility]  [address]  [facility] [address]    RE: Markie Rico       Dear Colleague,     I had the pleasure of seeing Markie Rico in the [facility].  HISTORY:    Markie Rico is a pleasant 61-year-old male with a longstanding cardiac history.  He presented with an MI in 2000 and underwent stenting of his LAD with a bare-metal stent.  He reports that he had another stenting procedure in 2007 but I do not see documentation of that.  In June 2011 he underwent stenting of his RCA and LAD and at that time had an ejection fraction of 45%.  In 2018 he underwent RCA PCI at an outside hospital.  He underwent hip arthroplasty at [facility] on September 12 and on September 14 developed chest pain with EKG evidence of an acute anterior MI.  He was brought to the [facility] the Cath Lab where he was found to have occlusion of his proximal LAD with no distal flow.  This was successfully repaired using a VIRGINIE with somewhat slowed distal flow at the conclusion of the case.  A follow-up echocardiogram showed ejection fraction of 25 to 30% and an aorta that is enlarged to 4.6 cm.  He is here today in clinic to reestablish long-term care and begin managing his ischemic cardiomyopathy.  He had been on lisinopril and beta-blockers but lisinopril dose was reduced and metoprolol stopped because of hypotension.    I personally reviewed the angiogram films and note good results with the anterior stenting although the flow post procedure seems mildly reduced.  I am also concerned about the ostium of the circumflex which appears somewhat hazy and multiple images and on the very last image appears severely stenosed.  He has some moderate RCA disease as well.    Today Markie reports that he feels that he is getting stronger.  He denies shortness of breath and states that he takes a walk 3 times per day.  He has more energy than he had prior to his surgery and cardiac event.  He  denies any further chest discomfort, palpitations, PND/orthopnea, syncope or near syncope, claudication, or peripheral edema.      ASSESSMENT/PLAN:    1.  Coronary artery disease.  Patient presented with an acute anterior MI a little over a month ago with stenting of his proximal to mid LAD with good results but somewhat slowed distal flow.  A nuclear stress test will be arranged to evaluate for residual ischemia, particularly in the circumflex distribution since in some views the ostial circumflex appears very tight on the angiogram films recently obtained.  2.  Ischemic cardiomyopathy.  The patient reports that he has been checking his blood pressure on a daily basis and his systolic values are usually between 101 110.  Today in clinic his systolic pressure is 126.  Today I will increase his lisinopril from 2.5 mg daily up to 5 mg daily and add metoprolol succinate 12.5 mg daily.  I will enroll him in our core program for further medication adjustments.  In the long run he is likely a good candidate for Jardiance, spironolactone, and Entresto, but I suspect his hypotension will significantly limit our ability to increase his CHF medications.  A repeat echocardiogram is ordered to reevaluate his ejection fraction since his most recent echocardiogram was done within 24 hours of his MI and I am hoping there has been some improvement in his LV function.  However, it is notable that his troponin candice to greater than 125,000 indicating substantial myocardial damage.  Fortunately, today he shows no signs of volume overload and does not suggest symptoms of CHF.    Thank you for inviting me to participate in the care of your patient.  Please don't hesitate to call if I can be of further assistance.  69 minutes were spent today reviewing the chart and other records, interviewing and examining the patient, and documenting our visit.    Chart documentation was completed, in part, with [employer] voice-recognition software. Even  though reviewed, some grammatical, spelling, and word errors may remain.       Orders Placed This Encounter   Procedures     Follow-Up with Cardiology Core- MIGUEL     Echocardiogram Complete     Orders Placed This Encounter   Medications     metoprolol succinate ER (TOPROL XL) 25 MG 24 hr tablet     Sig: Take 0.5 tablets (12.5 mg) by mouth daily     Dispense:  30 tablet     Refill:  4     lisinopril (ZESTRIL) 5 MG tablet     Sig: Take 1 tablet (5 mg) by mouth daily     Dispense:  90 tablet     Refill:  3     Medications Discontinued During This Encounter   Medication Reason     lisinopril (ZESTRIL) 2.5 MG tablet        10 year ASCVD risk: The ASCVD Risk score (Fely CONKLIN, et al., 2019) failed to calculate for the following reasons:    The patient has a prior MI or stroke diagnosis    Encounter Diagnoses   Name Primary?     Coronary artery disease involving native coronary artery of native heart without angina pectoris      Ischemic cardiomyopathy Yes       CURRENT MEDICATIONS:  Current Outpatient Medications   Medication Sig Dispense Refill     acetaminophen (TYLENOL) 325 MG tablet Take 2 tablets (650 mg) by mouth every 6 hours as needed for other (mild pain) 100 tablet 0     aspirin (ASA) 81 MG EC tablet Take 1 tablet (81 mg) by mouth daily 30 tablet 0     atorvastatin (LIPITOR) 20 MG tablet Take 1 tablet (20 mg) by mouth daily 30 tablet 0     buPROPion (WELLBUTRIN SR) 150 MG 12 hr tablet Take 150 mg by mouth daily       Cinnamon Bark POWD        clopidogrel (PLAVIX) 75 MG tablet Take 1 tablet (75 mg) by mouth daily 30 tablet 0     furosemide (LASIX) 10 MG/ML solution Take 1 mL (10 mg) by mouth 2 times daily 10 mL 0     lisinopril (ZESTRIL) 5 MG tablet Take 1 tablet (5 mg) by mouth daily 90 tablet 3     MAGNESIUM OXIDE PO Take 500 mg by mouth daily       methocarbamol (ROBAXIN) 500 MG tablet Take 1 tablet (500 mg) by mouth 3 times daily as needed for muscle spasms 90 tablet 0     metoprolol succinate ER (TOPROL XL) 25  MG 24 hr tablet Take 0.5 tablets (12.5 mg) by mouth daily 30 tablet 4     Multiple Vitamins-Minerals (CENTRUM MEN PO) Take 1 tablet by mouth daily       nitroGLYcerin (NITROSTAT) 0.4 MG sublingual tablet For chest pain place 1 tablet under the tongue every 5 minutes for 3 doses. If symptoms persist 5 minutes after 1st dose call 911. 25 tablet 0     order for DME Equipment being ordered: Walker ()  Treatment Diagnosis: s/p joint replacement 1 Device 0     oxyCODONE (ROXICODONE) 5 MG tablet Take 1-2 tablets (5-10 mg) by mouth every 4 hours as needed for moderate to severe pain 40 tablet 0     SEMAGLUTIDE, 1 MG/DOSE, SC Inject 1 mg Subcutaneous once a week Tuesdays       senna-docusate (SENOKOT-S/PERICOLACE) 8.6-50 MG tablet Take 1-2 tablets by mouth 2 times daily Take while on oral narcotics to prevent or treat constipation. 30 tablet 0     traZODone (DESYREL) 50 MG tablet Take 1 tablet (50 mg) by mouth At Bedtime 30 tablet 1     TURMERIC PO        metoprolol succinate ER (TOPROL XL) 25 MG 24 hr tablet Take 0.5 tablets (12.5 mg) by mouth daily for 30 days 15 tablet 0       ALLERGIES     Allergies   Allergen Reactions     No Known Allergies      Orlistat      Other reaction(s): Incontinence of feces       PAST MEDICAL HISTORY:  Past Medical History:   Diagnosis Date     Diverticulitis      Old myocardial infarction     s/p successful emergent revascualrization, PTCR/stent at the LAD for acute MI     Old myocardial infarction 06/07/11    Non-Q-wave MI-[place]     Other and unspecified hyperlipidemia      Tobacco use disorder        PAST SURGICAL HISTORY:  Past Surgical History:   Procedure Laterality Date     ARTHROPLASTY HIP Right 2/12/2018    Procedure: ARTHROPLASTY HIP;  right total hip arthroplasty;  Surgeon: Vineet Bedolla MD;  Location: [facility] OR     ARTHROPLASTY HIP Left 9/12/2022    Procedure: LEFT TOTAL HIP ARTHROPLASTY;  Surgeon: Vineet Briseno MD;  Location: [facility] OR     COLONOSCOPY N/A 5/20/2022     Procedure: COLONOSCOPY;  Surgeon: Mundo Velazquez MD;  Location:  GI     CV CORONARY ANGIOGRAM N/A 2022    Procedure: Coronary Angiogram;  Surgeon: Js Brady MD;  Location:  HEART CARDIAC CATH LAB     CV PCI N/A 2022    Procedure: Percutaneous Coronary Intervention;  Surgeon: Js Brady MD;  Location:  HEART CARDIAC CATH LAB     ZZC ANESTH,DX ARTHROSCOPIC PROC KNEE JOINT       ZZC COLOSTOMY       ZZC UNLISTED LAPAROSCOPY PROC,INTESTINE  2003    Exploratory Lap. Lysis of adhesions. Sigmoid colostomy takedown and stapled coloproctostomy.     ZZHC PTCA SINGLE VESSEL      2 stents       FAMILY HISTORY:  Family History   Problem Relation Age of Onset     Lipids Mother      Psychotic Disorder Mother         anxiety     Hypertension Mother      Prostate Cancer Father          at 74     Diabetes Sister        SOCIAL HISTORY:  Social History     Socioeconomic History     Marital status:      Spouse name: Farida     Number of children: 3     Years of education: 12     Highest education level: None   Occupational History     Occupation: install [employer]   Tobacco Use     Smoking status: Former     Types: Cigarettes     Quit date: 2000     Years since quittin.8     Smokeless tobacco: Former   Vaping Use     Vaping Use: Never used   Substance and Sexual Activity     Alcohol use: Not Currently     Comment: Occasional     Drug use: Not Currently     Sexual activity: Never     Partners: Female     Birth control/protection: Surgical   Other Topics Concern      Service Yes     Comment: Marine     Blood Transfusions No     Caffeine Concern No     Comment: Hasn't had caffeine for 2 weeks     Occupational Exposure No     Hobby Hazards No     Sleep Concern No     Stress Concern No     Weight Concern No     Special Diet Yes     Comment: Low Carb / High Protein     Back Care No     Exercise Yes     Comment: Walks     Seat Belt Yes       Review of Systems:  Skin:         Eyes:       ENT:       Respiratory:  Negative shortness of breath;cough;wheezing  Cardiovascular:  Negative;palpitations;chest pain;edema;lightheadedness;dizziness;syncope or near-syncope    Gastroenterology:      Genitourinary:       Musculoskeletal:       Neurologic:  Negative numbness or tingling of hands;numbness or tingling of feet  Psychiatric:       Heme/Lymph/Imm:  Positive for allergies  Endocrine:         Physical Exam:  Vitals: /78 (BP Location: Left arm, Patient Position: Sitting, Cuff Size: Adult Large)   Pulse 110   Wt 116.3 kg (256 lb 4.8 oz)   SpO2 97%   BMI 36.78 kg/m      Constitutional:           Skin:           Head:           Eyes:           ENT:           Neck:           Chest:           Cardiac:                    Abdomen:           Vascular:                                        Extremities and Muscular Skeletal:              Neurological:           Psych:        Recent Lab Results:  LIPID RESULTS:  Lab Results   Component Value Date    CHOL 119 09/12/2022    CHOL 321 (A) 01/26/2018    HDL 48 09/12/2022    HDL 39 (A) 01/26/2018    LDL 54 09/12/2022     (A) 01/26/2018    TRIG 83 09/12/2022    TRIG 209 (A) 01/26/2018    CHOLHDLRATIO 4.0 11/26/2012       LIVER ENZYME RESULTS:  Lab Results   Component Value Date    AST 20 10/02/2022     (H) 04/14/2018    ALT 19 10/02/2022    ALT 35 04/14/2018       CBC RESULTS:  Lab Results   Component Value Date    WBC 9.7 10/02/2022    WBC 12.8 (H) 04/14/2018    RBC 3.38 (L) 10/02/2022    RBC 4.66 04/14/2018    HGB 10.1 (L) 10/02/2022    HGB 13.4 04/14/2018    HCT 31.7 (L) 10/02/2022    HCT 40.8 04/14/2018    MCV 94 10/02/2022    MCV 88 04/14/2018    MCH 29.9 10/02/2022    MCH 28.8 04/14/2018    MCHC 31.9 10/02/2022    MCHC 32.8 04/14/2018    RDW 16.3 (H) 10/02/2022    RDW 13.7 04/14/2018     10/02/2022     04/14/2018       BMP RESULTS:  Lab Results   Component Value Date     10/02/2022     04/14/2018     POTASSIUM 4.4 10/02/2022    POTASSIUM 3.7 04/14/2018    CHLORIDE 112 (H) 10/02/2022    CHLORIDE 107 04/14/2018    CO2 26 10/02/2022    CO2 25 04/14/2018    ANIONGAP 6 10/02/2022    ANIONGAP 9 04/14/2018     (H) 10/02/2022     (H) 04/14/2018    BUN 13 10/02/2022    BUN 10 04/14/2018    CR 0.73 10/02/2022    CR 0.43 (L) 04/14/2018    GFRESTIMATED >90 10/02/2022    GFRESTIMATED >90 04/14/2018    GFRESTBLACK >90 04/14/2018    ASHLEY 8.7 10/02/2022    ASHLEY 8.8 04/14/2018        A1C RESULTS:  No results found for: A1C    INR RESULTS:  Lab Results   Component Value Date    INR 0.97 04/14/2018    INR 1.07 06/08/2011         Pan Espinal MD, [facility]    CC  No referring provider defined for this encounter.    Thank you for allowing me to participate in the care of your patient.      Sincerely,     Pan Espinal MD     [facility]

## 2022-11-02 NOTE — PROGRESS NOTES
HISTORY:    Markie Rico is a pleasant 61-year-old male with a longstanding cardiac history.  He presented with an MI in 2000 and underwent stenting of his LAD with a bare-metal stent.  He reports that he had another stenting procedure in 2007 but I do not see documentation of that.  In June 2011 he underwent stenting of his RCA and LAD and at that time had an ejection fraction of 45%.  In 2018 he underwent RCA PCI at an outside hospital.  He underwent hip arthroplasty at [facility] on September 12 and on September 14 developed chest pain with EKG evidence of an acute anterior MI.  He was brought to the [facility] the Cath Lab where he was found to have occlusion of his proximal LAD with no distal flow.  This was successfully repaired using a VIRGINIE with somewhat slowed distal flow at the conclusion of the case.  A follow-up echocardiogram showed ejection fraction of 25 to 30% and an aorta that is enlarged to 4.6 cm.  He is here today in clinic to reestablish long-term care and begin managing his ischemic cardiomyopathy.  He had been on lisinopril and beta-blockers but lisinopril dose was reduced and metoprolol stopped because of hypotension.    I personally reviewed the angiogram films and note good results with the anterior stenting although the flow post procedure seems mildly reduced.  I am also concerned about the ostium of the circumflex which appears somewhat hazy and multiple images and on the very last image appears severely stenosed.  He has some moderate RCA disease as well.    Today Markie reports that he feels that he is getting stronger.  He denies shortness of breath and states that he takes a walk 3 times per day.  He has more energy than he had prior to his surgery and cardiac event.  He denies any further chest discomfort, palpitations, PND/orthopnea, syncope or near syncope, claudication, or peripheral edema.      ASSESSMENT/PLAN:    1.  Coronary artery disease.  Patient presented with an  acute anterior MI a little over a month ago with stenting of his proximal to mid LAD with good results but somewhat slowed distal flow.  A nuclear stress test will be arranged to evaluate for residual ischemia, particularly in the circumflex distribution since in some views the ostial circumflex appears very tight on the angiogram films recently obtained.  2.  Ischemic cardiomyopathy.  The patient reports that he has been checking his blood pressure on a daily basis and his systolic values are usually between 101 110.  Today in clinic his systolic pressure is 126.  Today I will increase his lisinopril from 2.5 mg daily up to 5 mg daily and add metoprolol succinate 12.5 mg daily.  I will enroll him in our core program for further medication adjustments.  In the long run he is likely a good candidate for Jardiance, spironolactone, and Entresto, but I suspect his hypotension will significantly limit our ability to increase his CHF medications.  A repeat echocardiogram is ordered to reevaluate his ejection fraction since his most recent echocardiogram was done within 24 hours of his MI and I am hoping there has been some improvement in his LV function.  However, it is notable that his troponin candice to greater than 125,000 indicating substantial myocardial damage.  Fortunately, today he shows no signs of volume overload and does not suggest symptoms of CHF.    Thank you for inviting me to participate in the care of your patient.  Please don't hesitate to call if I can be of further assistance.  69 minutes were spent today reviewing the chart and other records, interviewing and examining the patient, and documenting our visit.    Chart documentation was completed, in part, with [employer] voice-recognition software. Even though reviewed, some grammatical, spelling, and word errors may remain.       Orders Placed This Encounter   Procedures     Follow-Up with Cardiology Core- MIGUEL     Echocardiogram Complete     Orders Placed  This Encounter   Medications     metoprolol succinate ER (TOPROL XL) 25 MG 24 hr tablet     Sig: Take 0.5 tablets (12.5 mg) by mouth daily     Dispense:  30 tablet     Refill:  4     lisinopril (ZESTRIL) 5 MG tablet     Sig: Take 1 tablet (5 mg) by mouth daily     Dispense:  90 tablet     Refill:  3     Medications Discontinued During This Encounter   Medication Reason     lisinopril (ZESTRIL) 2.5 MG tablet        10 year ASCVD risk: The ASCVD Risk score (Fely CONKLIN, et al., 2019) failed to calculate for the following reasons:    The patient has a prior MI or stroke diagnosis    Encounter Diagnoses   Name Primary?     Coronary artery disease involving native coronary artery of native heart without angina pectoris      Ischemic cardiomyopathy Yes       CURRENT MEDICATIONS:  Current Outpatient Medications   Medication Sig Dispense Refill     acetaminophen (TYLENOL) 325 MG tablet Take 2 tablets (650 mg) by mouth every 6 hours as needed for other (mild pain) 100 tablet 0     aspirin (ASA) 81 MG EC tablet Take 1 tablet (81 mg) by mouth daily 30 tablet 0     atorvastatin (LIPITOR) 20 MG tablet Take 1 tablet (20 mg) by mouth daily 30 tablet 0     buPROPion (WELLBUTRIN SR) 150 MG 12 hr tablet Take 150 mg by mouth daily       Cinnamon Bark POWD        clopidogrel (PLAVIX) 75 MG tablet Take 1 tablet (75 mg) by mouth daily 30 tablet 0     furosemide (LASIX) 10 MG/ML solution Take 1 mL (10 mg) by mouth 2 times daily 10 mL 0     lisinopril (ZESTRIL) 5 MG tablet Take 1 tablet (5 mg) by mouth daily 90 tablet 3     MAGNESIUM OXIDE PO Take 500 mg by mouth daily       methocarbamol (ROBAXIN) 500 MG tablet Take 1 tablet (500 mg) by mouth 3 times daily as needed for muscle spasms 90 tablet 0     metoprolol succinate ER (TOPROL XL) 25 MG 24 hr tablet Take 0.5 tablets (12.5 mg) by mouth daily 30 tablet 4     Multiple Vitamins-Minerals (CENTRUM MEN PO) Take 1 tablet by mouth daily       nitroGLYcerin (NITROSTAT) 0.4 MG sublingual tablet  For chest pain place 1 tablet under the tongue every 5 minutes for 3 doses. If symptoms persist 5 minutes after 1st dose call 911. 25 tablet 0     order for DME Equipment being ordered: Walker ()  Treatment Diagnosis: s/p joint replacement 1 Device 0     oxyCODONE (ROXICODONE) 5 MG tablet Take 1-2 tablets (5-10 mg) by mouth every 4 hours as needed for moderate to severe pain 40 tablet 0     SEMAGLUTIDE, 1 MG/DOSE, SC Inject 1 mg Subcutaneous once a week Tuesdays       senna-docusate (SENOKOT-S/PERICOLACE) 8.6-50 MG tablet Take 1-2 tablets by mouth 2 times daily Take while on oral narcotics to prevent or treat constipation. 30 tablet 0     traZODone (DESYREL) 50 MG tablet Take 1 tablet (50 mg) by mouth At Bedtime 30 tablet 1     TURMERIC PO        metoprolol succinate ER (TOPROL XL) 25 MG 24 hr tablet Take 0.5 tablets (12.5 mg) by mouth daily for 30 days 15 tablet 0       ALLERGIES     Allergies   Allergen Reactions     No Known Allergies      Orlistat      Other reaction(s): Incontinence of feces       PAST MEDICAL HISTORY:  Past Medical History:   Diagnosis Date     Diverticulitis      Old myocardial infarction     s/p successful emergent revascualrization, PTCR/stent at the LAD for acute MI     Old myocardial infarction 06/07/11    Non-Q-wave MI-[place]     Other and unspecified hyperlipidemia      Tobacco use disorder        PAST SURGICAL HISTORY:  Past Surgical History:   Procedure Laterality Date     ARTHROPLASTY HIP Right 2/12/2018    Procedure: ARTHROPLASTY HIP;  right total hip arthroplasty;  Surgeon: Vineet Bedolla MD;  Location:  OR     ARTHROPLASTY HIP Left 9/12/2022    Procedure: LEFT TOTAL HIP ARTHROPLASTY;  Surgeon: Vineet Briseno MD;  Location:  OR     COLONOSCOPY N/A 5/20/2022    Procedure: COLONOSCOPY;  Surgeon: Mundo Velazquez MD;  Location:  GI     CV CORONARY ANGIOGRAM N/A 9/12/2022    Procedure: Coronary Angiogram;  Surgeon: Js Brady MD;  Location: [facility]  CARDIAC CATH LAB     CV PCI N/A 2022    Procedure: Percutaneous Coronary Intervention;  Surgeon: Js Brady MD;  Location:  HEART CARDIAC CATH LAB     ZZC ANESTH,DX ARTHROSCOPIC PROC KNEE JOINT       ZZC COLOSTOMY       ZZC UNLISTED LAPAROSCOPY PROC,INTESTINE  2003    Exploratory Lap. Lysis of adhesions. Sigmoid colostomy takedown and stapled coloproctostomy.     ZZ PTCA SINGLE VESSEL      2 stents       FAMILY HISTORY:  Family History   Problem Relation Age of Onset     Lipids Mother      Psychotic Disorder Mother         anxiety     Hypertension Mother      Prostate Cancer Father          at 74     Diabetes Sister        SOCIAL HISTORY:  Social History     Socioeconomic History     Marital status:      Spouse name: Farida     Number of children: 3     Years of education: 12     Highest education level: None   Occupational History     Occupation: [employer]   Tobacco Use     Smoking status: Former     Types: Cigarettes     Quit date: 2000     Years since quittin.8     Smokeless tobacco: Former   Vaping Use     Vaping Use: Never used   Substance and Sexual Activity     Alcohol use: Not Currently     Comment: Occasional     Drug use: Not Currently     Sexual activity: Never     Partners: Female     Birth control/protection: Surgical   Other Topics Concern      Service Yes     Comment: Marine     Blood Transfusions No     Caffeine Concern No     Comment: Hasn't had caffeine for 2 weeks     Occupational Exposure No     Hobby Hazards No     Sleep Concern No     Stress Concern No     Weight Concern No     Special Diet Yes     Comment: Low Carb / High Protein     Back Care No     Exercise Yes     Comment: Walks     Seat Belt Yes       Review of Systems:  Skin:        Eyes:       ENT:       Respiratory:  Negative shortness of breath;cough;wheezing  Cardiovascular:  Negative;palpitations;chest pain;edema;lightheadedness;dizziness;syncope or near-syncope     Gastroenterology:      Genitourinary:       Musculoskeletal:       Neurologic:  Negative numbness or tingling of hands;numbness or tingling of feet  Psychiatric:       Heme/Lymph/Imm:  Positive for allergies  Endocrine:         Physical Exam:  Vitals: /78 (BP Location: Left arm, Patient Position: Sitting, Cuff Size: Adult Large)   Pulse 110   Wt 116.3 kg (256 lb 4.8 oz)   SpO2 97%   BMI 36.78 kg/m      Constitutional:           Skin:           Head:           Eyes:           ENT:           Neck:           Chest:           Cardiac:                    Abdomen:           Vascular:                                        Extremities and Muscular Skeletal:              Neurological:           Psych:        Recent Lab Results:  LIPID RESULTS:  Lab Results   Component Value Date    CHOL 119 09/12/2022    CHOL 321 (A) 01/26/2018    HDL 48 09/12/2022    HDL 39 (A) 01/26/2018    LDL 54 09/12/2022     (A) 01/26/2018    TRIG 83 09/12/2022    TRIG 209 (A) 01/26/2018    CHOLHDLRATIO 4.0 11/26/2012       LIVER ENZYME RESULTS:  Lab Results   Component Value Date    AST 20 10/02/2022     (H) 04/14/2018    ALT 19 10/02/2022    ALT 35 04/14/2018       CBC RESULTS:  Lab Results   Component Value Date    WBC 9.7 10/02/2022    WBC 12.8 (H) 04/14/2018    RBC 3.38 (L) 10/02/2022    RBC 4.66 04/14/2018    HGB 10.1 (L) 10/02/2022    HGB 13.4 04/14/2018    HCT 31.7 (L) 10/02/2022    HCT 40.8 04/14/2018    MCV 94 10/02/2022    MCV 88 04/14/2018    MCH 29.9 10/02/2022    MCH 28.8 04/14/2018    MCHC 31.9 10/02/2022    MCHC 32.8 04/14/2018    RDW 16.3 (H) 10/02/2022    RDW 13.7 04/14/2018     10/02/2022     04/14/2018       BMP RESULTS:  Lab Results   Component Value Date     10/02/2022     04/14/2018    POTASSIUM 4.4 10/02/2022    POTASSIUM 3.7 04/14/2018    CHLORIDE 112 (H) 10/02/2022    CHLORIDE 107 04/14/2018    CO2 26 10/02/2022    CO2 25 04/14/2018    ANIONGAP 6 10/02/2022    ANIONGAP 9  04/14/2018     (H) 10/02/2022     (H) 04/14/2018    BUN 13 10/02/2022    BUN 10 04/14/2018    CR 0.73 10/02/2022    CR 0.43 (L) 04/14/2018    GFRESTIMATED >90 10/02/2022    GFRESTIMATED >90 04/14/2018    GFRESTBLACK >90 04/14/2018    ASHLEY 8.7 10/02/2022    ASHLEY 8.8 04/14/2018        A1C RESULTS:  No results found for: A1C    INR RESULTS:  Lab Results   Component Value Date    INR 0.97 04/14/2018    INR 1.07 06/08/2011         Pan Espinal MD, [facility]    CC  No referring provider defined for this encounter.

## 2022-11-17 ENCOUNTER — HOSPITAL ENCOUNTER (OUTPATIENT)
Dept: CARDIOLOGY | Facility: CLINIC | Age: 62
Discharge: HOME | End: 2022-11-17
Attending: INTERNAL MEDICINE | Admitting: INTERNAL MEDICINE
Payer: COMMERCIAL

## 2022-11-17 DIAGNOSIS — I25.10 CORONARY ARTERY DISEASE INVOLVING NATIVE CORONARY ARTERY OF NATIVE HEART WITHOUT ANGINA PECTORIS: ICD-10-CM

## 2022-11-17 DIAGNOSIS — I25.5 ISCHEMIC CARDIOMYOPATHY: ICD-10-CM

## 2022-11-17 LAB — BI-PLANE LVEF ECHO: NORMAL

## 2022-11-17 PROCEDURE — 255N000002 HC RX 255 OP 636: Performed by: INTERNAL MEDICINE

## 2022-11-17 PROCEDURE — 93325 DOPPLER ECHO COLOR FLOW MAPG: CPT | Mod: 26 | Performed by: INTERNAL MEDICINE

## 2022-11-17 PROCEDURE — 93321 DOPPLER ECHO F-UP/LMTD STD: CPT | Mod: 26 | Performed by: INTERNAL MEDICINE

## 2022-11-17 PROCEDURE — 93308 TTE F-UP OR LMTD: CPT | Mod: 26 | Performed by: INTERNAL MEDICINE

## 2022-11-17 PROCEDURE — 93321 DOPPLER ECHO F-UP/LMTD STD: CPT

## 2022-11-17 RX ADMIN — PERFLUTREN 6 ML: 6.52 INJECTION, SUSPENSION INTRAVENOUS at 14:26

## 2022-11-30 ENCOUNTER — TELEPHONE (OUTPATIENT)
Dept: CARDIOLOGY | Facility: CLINIC | Age: 62
End: 2022-11-30

## 2022-11-30 DIAGNOSIS — I21.3 ST ELEVATION MYOCARDIAL INFARCTION (STEMI), UNSPECIFIED ARTERY (H): ICD-10-CM

## 2022-11-30 DIAGNOSIS — I25.5 ISCHEMIC CARDIOMYOPATHY: ICD-10-CM

## 2022-11-30 DIAGNOSIS — I25.10 CORONARY ARTERY DISEASE INVOLVING NATIVE CORONARY ARTERY OF NATIVE HEART WITHOUT ANGINA PECTORIS: Primary | ICD-10-CM

## 2022-11-30 DIAGNOSIS — I21.9 ACUTE MYOCARDIAL INFARCTION, INITIAL EPISODE OF CARE (H): ICD-10-CM

## 2022-11-30 NOTE — TELEPHONE ENCOUNTER
Patient returned call. He is willing to do the stress MRI at [facility]. Order placed. Message sent to [facility] schedulers to call patient to get stress MRI set up. If stress MRI is not able to be done until after 12/8 when patient has his follow up then we will move out the follow up until after stress MRI is completed. Patient verbalized understanding.

## 2022-11-30 NOTE — TELEPHONE ENCOUNTER
Tried to reach patient to review Dr. Espinal's recommendations noted below. Patient did not answer. Left message with direct call back number to return call.

## 2022-11-30 NOTE — TELEPHONE ENCOUNTER
----- Message from Pan Espinal MD sent at 11/29/2022  4:18 PM CST -----  Regarding: RE: Nuclear stress aborted  (FYI)  I do not think he will be able to exercise, and echo images are not likely to be good, so stress echo probably will not work.  The only other good alternative would be a stress MRI.  This can not be done in [place] but lets see if we can try to get this completed at [facility].  ----- Message -----  From: Gadiel Gomez EP  Sent: 11/29/2022  10:14 AM CST  To: Pan Espinal MD  Subject: Nuclear stress aborted  (FYI)                    Nuclear stress Test aborted du to shoulder immobility.  Markie could not get his left arm up for imaging under the camera. Multiple attempts were made with no successful results. The reading Cardiologist was contacted to seek advice. She recommended that the test be aborted as its difficult to read with the arm down and with his BMI it would make it more complicated. Dr Espinal will be notified and other testing options will need to be explored.
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
## 2023-02-02 ENCOUNTER — HOSPITAL ENCOUNTER (OUTPATIENT)
Dept: CARDIOLOGY | Facility: CLINIC | Age: 63
Discharge: HOME | End: 2023-02-02
Attending: INTERNAL MEDICINE | Admitting: INTERNAL MEDICINE
Payer: COMMERCIAL

## 2023-02-02 VITALS — HEART RATE: 83 BPM | SYSTOLIC BLOOD PRESSURE: 115 MMHG | DIASTOLIC BLOOD PRESSURE: 66 MMHG

## 2023-02-02 DIAGNOSIS — I25.5 ISCHEMIC CARDIOMYOPATHY: ICD-10-CM

## 2023-02-02 DIAGNOSIS — I25.10 CORONARY ARTERY DISEASE INVOLVING NATIVE CORONARY ARTERY OF NATIVE HEART WITHOUT ANGINA PECTORIS: ICD-10-CM

## 2023-02-02 DIAGNOSIS — I21.3 ST ELEVATION MYOCARDIAL INFARCTION (STEMI), UNSPECIFIED ARTERY (H): ICD-10-CM

## 2023-02-02 DIAGNOSIS — I21.9 ACUTE MYOCARDIAL INFARCTION, INITIAL EPISODE OF CARE (H): ICD-10-CM

## 2023-02-02 PROCEDURE — 255N000002 HC RX 255 OP 636: Performed by: INTERNAL MEDICINE

## 2023-02-02 PROCEDURE — 93017 CV STRESS TEST TRACING ONLY: CPT

## 2023-02-02 PROCEDURE — 93016 CV STRESS TEST SUPVJ ONLY: CPT | Performed by: INTERNAL MEDICINE

## 2023-02-02 PROCEDURE — 250N000011 HC RX IP 250 OP 636: Performed by: INTERNAL MEDICINE

## 2023-02-02 PROCEDURE — A9585 GADOBUTROL INJECTION: HCPCS | Performed by: INTERNAL MEDICINE

## 2023-02-02 PROCEDURE — 75563 CARD MRI W/STRESS IMG & DYE: CPT | Mod: 26 | Performed by: INTERNAL MEDICINE

## 2023-02-02 PROCEDURE — 93018 CV STRESS TEST I&R ONLY: CPT | Performed by: INTERNAL MEDICINE

## 2023-02-02 RX ORDER — ALBUTEROL SULFATE 90 UG/1
2 AEROSOL, METERED RESPIRATORY (INHALATION) EVERY 5 MIN PRN
Status: DISCONTINUED | OUTPATIENT
Start: 2023-02-02 | End: 2023-02-03 | Stop reason: HOSPADM

## 2023-02-02 RX ORDER — CAFFEINE CITRATE 20 MG/ML
60 SOLUTION INTRAVENOUS
Status: DISCONTINUED | OUTPATIENT
Start: 2023-02-02 | End: 2023-02-03 | Stop reason: HOSPADM

## 2023-02-02 RX ORDER — REGADENOSON 0.08 MG/ML
0.4 INJECTION, SOLUTION INTRAVENOUS ONCE
Status: COMPLETED | OUTPATIENT
Start: 2023-02-02 | End: 2023-02-02

## 2023-02-02 RX ORDER — METHYLPREDNISOLONE SODIUM SUCCINATE 125 MG/2ML
125 INJECTION, POWDER, LYOPHILIZED, FOR SOLUTION INTRAMUSCULAR; INTRAVENOUS
Status: DISCONTINUED | OUTPATIENT
Start: 2023-02-02 | End: 2023-02-03 | Stop reason: HOSPADM

## 2023-02-02 RX ORDER — DIAZEPAM 5 MG
5 TABLET ORAL EVERY 30 MIN PRN
Status: DISCONTINUED | OUTPATIENT
Start: 2023-02-02 | End: 2023-02-03 | Stop reason: HOSPADM

## 2023-02-02 RX ORDER — DIPHENHYDRAMINE HYDROCHLORIDE 50 MG/ML
25-50 INJECTION INTRAMUSCULAR; INTRAVENOUS
Status: DISCONTINUED | OUTPATIENT
Start: 2023-02-02 | End: 2023-02-03 | Stop reason: HOSPADM

## 2023-02-02 RX ORDER — DIPHENHYDRAMINE HCL 25 MG
25 CAPSULE ORAL
Status: DISCONTINUED | OUTPATIENT
Start: 2023-02-02 | End: 2023-02-03 | Stop reason: HOSPADM

## 2023-02-02 RX ORDER — ONDANSETRON 2 MG/ML
4 INJECTION INTRAMUSCULAR; INTRAVENOUS
Status: DISCONTINUED | OUTPATIENT
Start: 2023-02-02 | End: 2023-02-03 | Stop reason: HOSPADM

## 2023-02-02 RX ORDER — AMINOPHYLLINE 25 MG/ML
100 INJECTION, SOLUTION INTRAVENOUS ONCE
Status: DISCONTINUED | OUTPATIENT
Start: 2023-02-02 | End: 2023-02-03 | Stop reason: HOSPADM

## 2023-02-02 RX ORDER — GADOBUTROL 604.72 MG/ML
28 INJECTION INTRAVENOUS ONCE
Status: COMPLETED | OUTPATIENT
Start: 2023-02-02 | End: 2023-02-02

## 2023-02-02 RX ORDER — ACYCLOVIR 200 MG/1
0-1 CAPSULE ORAL
Status: DISCONTINUED | OUTPATIENT
Start: 2023-02-02 | End: 2023-02-03 | Stop reason: HOSPADM

## 2023-02-02 RX ADMIN — GADOBUTROL 28 ML: 604.72 INJECTION INTRAVENOUS at 11:21

## 2023-02-02 RX ADMIN — REGADENOSON 0.4 MG: 0.08 INJECTION, SOLUTION INTRAVENOUS at 09:01

## 2023-02-03 NOTE — RESULT ENCOUNTER NOTE
Good results.  I was concerned about the ostium of the circumflex, but he has no angina and the stress test shows no ischemia, so no further evaluation is needed.      Notified of results Via v.m and to call back if any other questions has follow up with MIGUEL

## 2023-02-15 ENCOUNTER — OFFICE VISIT (OUTPATIENT)
Dept: CARDIOLOGY | Facility: CLINIC | Age: 63
End: 2023-02-15
Attending: INTERNAL MEDICINE
Payer: COMMERCIAL

## 2023-02-15 VITALS
WEIGHT: 254.6 LBS | HEIGHT: 70 IN | BODY MASS INDEX: 36.45 KG/M2 | DIASTOLIC BLOOD PRESSURE: 82 MMHG | HEART RATE: 85 BPM | SYSTOLIC BLOOD PRESSURE: 124 MMHG | OXYGEN SATURATION: 96 %

## 2023-02-15 DIAGNOSIS — I25.10 CORONARY ARTERY DISEASE INVOLVING NATIVE CORONARY ARTERY OF NATIVE HEART WITHOUT ANGINA PECTORIS: ICD-10-CM

## 2023-02-15 DIAGNOSIS — I25.5 ISCHEMIC CARDIOMYOPATHY: Primary | ICD-10-CM

## 2023-02-15 DIAGNOSIS — I21.3 ACUTE ST ELEVATION MYOCARDIAL INFARCTION (STEMI), UNSPECIFIED ARTERY (H): ICD-10-CM

## 2023-02-15 PROCEDURE — 99215 OFFICE O/P EST HI 40 MIN: CPT | Performed by: NURSE PRACTITIONER

## 2023-02-15 RX ORDER — SACUBITRIL AND VALSARTAN 24; 26 MG/1; MG/1
1 TABLET, FILM COATED ORAL 2 TIMES DAILY
Qty: 180 TABLET | Refills: 3 | Status: SHIPPED | OUTPATIENT
Start: 2023-02-15 | End: 2023-04-06

## 2023-02-15 RX ORDER — METOPROLOL SUCCINATE 25 MG/1
25 TABLET, EXTENDED RELEASE ORAL DAILY
Qty: 90 TABLET | Refills: 3 | Status: SHIPPED | OUTPATIENT
Start: 2023-02-15 | End: 2023-05-18

## 2023-02-15 RX ORDER — SACUBITRIL AND VALSARTAN 24; 26 MG/1; MG/1
1 TABLET, FILM COATED ORAL 2 TIMES DAILY
Qty: 60 TABLET | Refills: 4 | Status: SHIPPED | OUTPATIENT
Start: 2023-02-15 | End: 2023-02-15

## 2023-02-15 NOTE — PROGRESS NOTES
HPI:  Markie Rico is a delightful 62-year-old gentleman that I am having the pleasure of meeting today.  He is an established patient of Dr. Espinal.  He has a history of coronary artery disease with MI in 2000.  He underwent stenting of his LAD with a bare-metal stent at that time.  In 2007 the patient reported another stenting procedure.  In 2011 he underwent a stent to his RCA and LAD.  His EF was 45%.  In 2018 he underwent RCA PCI at an outside facility.  November 12, 2022 he underwent a hip arthroplasty at [facility].  On September 14 he developed chest pain with evidence of an acute anterior wall myocardial infarction.  He was brought to the Cath Lab at [facility] found to have an occlusion of his proximal LAD with no distal flow.  He underwent PTCA and with somewhat slow to distal flow at the conclusion of the case.    Follow-up echocardiogram showed an EF of 25 to 30%.  Aorta was measured at 4.6 cm.    He underwent a cardiac MRI 2/2/2023 which showed an EF of 31%.  He had apical, mid to distal anterior septal and anterior wall akinesis with walls thinned out.  RV function was borderline reduced at 53%.  Late gadolinium enhancement showed subendocardial delay and transmural pattern involving the anterior, mid to distal anterior septal and apical walls consistent with large size infarct in the LAD territory.  He had 33% scar noted.  The rest of the myocardium was essentially viable.  Stress perfusion imaging showed no ischemia.  Dr. Espinal reviewed the results and was pleased that the stress test showed no ischemia.    Currently Markie denies chest pain, orthopnea, PND, syncope.  He does get lower extremity edema bilaterally.  He continues to sleep in a recliner.  States that he tries to elevate his legs as much possible during the night.  He admits to not following a low-sodium diet.  He was briefly placed on 5 days of Lasix when he suffered from RSV a month ago.  He felt that the Lasix was  just starting to help when it was discontinued.    Plan:   1. anterior STEMI post left total hip 9/12/2022  Troponin > 125,000  Status post angiogram with balloon angioplasty of the previous ostial to mid LAD stent left circumflex OM1 disease.  RCA had mild disease.  Plavix was initially held for anemia.  Patient states that he recently was seen by PCP and hemoglobin was 14.  He remains on both aspirin and clopidogrel therapy.  Denies chest pain.  Metoprolol ER will be increased from 12.5 to 25 mg daily.  I would like to stop lisinopril in the near future and start Entresto 24/26 mg twice daily.  [facility]  [facility]    2. ischemic cardiomyopathy EF 31% on cMRI  Heart failure education was completed with Markie today.  I have asked that he try to follow a 2000 mg low-sodium diet.  Metoprolol ER will be increased to 25 mg daily.  I would like to try to initiate and uptitrate Entresto.  He would also benefit from an SGLT2 inhibitor in the future.  He has had a history of symptomatic hypotension, therefore, I will be very slow and uptitrating his medications.  I would like him to try to start a walking program.  In regard to his lower extremity edema I think this is multifactorial; sleeping in a recliner and a high sodium diet.  Entresto does often have a diuretic effect which hopefully will help offset his edema.  He currently is not on diuretic therapy    3.  Obesity BMI 36.5  Was previously on Ozempic but stopped due to medication shortage  Was recently started on another injectable for weight loss.    40 minutes was completed today greater than 50% of this was related to counseling, reviewing to cMRI and reviewing the medications and why they will hopefully be beneficial in helping to preserve his cardiac function.  Patient did not participate in cardiac rehab because he was recovering from his total left hip replacement.  Once weather permits I do think it would  be beneficial for him to be part of a walking program.  I would like to see if he can do cardiac rehab now that he is able to start walking.  He currently is using 1 cane.  I would like to see him back in 6 to 8 weeks with a BMP.  If there are questions or concerns of asked that he please contact us.  [facility] [place] so they are aware of our discussion and the plan to optimize his heart failure medications.  Please feel free to contact us if you have questions or concerns regarding today's assessment and plan.    Patti Coleman, NP, APRN CNP      Today's clinic visit entailed:  Review of the result(s) of each unique test - labs a cMRI  Ordering of each unique test  Prescription drug management  No LOS data to display   Time spent doing chart review, history and exam, documentation and further activities per the note  Provider  Link to MDM Help Grid     The level of medical decision making during this visit was of moderate complexity.      Orders Placed This Encounter   Procedures     Basic metabolic panel     Follow-Up with Cardiology MIGUEL       Orders Placed This Encounter   Medications     DISCONTD: sacubitril-valsartan (ENTRESTO) 24-26 MG per tablet     Sig: Take 1 tablet by mouth 2 times daily     Dispense:  60 tablet     Refill:  4     metoprolol succinate ER (TOPROL XL) 25 MG 24 hr tablet     Sig: Take 1 tablet (25 mg) by mouth daily     Dispense:  90 tablet     Refill:  3     sacubitril-valsartan (ENTRESTO) 24-26 MG per tablet     Sig: Take 1 tablet by mouth 2 times daily     Dispense:  180 tablet     Refill:  3       Medications Discontinued During This Encounter   Medication Reason     oxyCODONE (ROXICODONE) 5 MG tablet Therapy completed (No AVS)     lisinopril (ZESTRIL) 5 MG tablet Alternate therapy     metoprolol succinate ER (TOPROL XL) 25 MG 24 hr tablet      sacubitril-valsartan (ENTRESTO) 24-26 MG per tablet Reorder (No AVS / No eCancel)         Encounter Diagnoses   Name  Primary?     Coronary artery disease involving native coronary artery of native heart without angina pectoris      Ischemic cardiomyopathy        CURRENT MEDICATIONS:  Current Outpatient Medications   Medication Sig Dispense Refill     acetaminophen (TYLENOL) 325 MG tablet Take 2 tablets (650 mg) by mouth every 6 hours as needed for other (mild pain) 100 tablet 0     aspirin (ASA) 81 MG EC tablet Take 1 tablet (81 mg) by mouth daily 30 tablet 0     atorvastatin (LIPITOR) 20 MG tablet Take 1 tablet (20 mg) by mouth daily 30 tablet 0     buPROPion (WELLBUTRIN SR) 150 MG 12 hr tablet Take 150 mg by mouth daily       Cinnamon Bark POWD        clopidogrel (PLAVIX) 75 MG tablet Take 1 tablet (75 mg) by mouth daily 30 tablet 0     MAGNESIUM OXIDE PO Take 500 mg by mouth daily       methocarbamol (ROBAXIN) 500 MG tablet Take 1 tablet (500 mg) by mouth 3 times daily as needed for muscle spasms 90 tablet 0     metoprolol succinate ER (TOPROL XL) 25 MG 24 hr tablet Take 1 tablet (25 mg) by mouth daily 90 tablet 3     Multiple Vitamins-Minerals (CENTRUM MEN PO) Take 1 tablet by mouth daily       order for DME Equipment being ordered: Walker ()  Treatment Diagnosis: s/p joint replacement 1 Device 0     sacubitril-valsartan (ENTRESTO) 24-26 MG per tablet Take 1 tablet by mouth 2 times daily 180 tablet 3     SEMAGLUTIDE, 1 MG/DOSE, SC Inject 1 mg Subcutaneous once a week Tuesdays       senna-docusate (SENOKOT-S/PERICOLACE) 8.6-50 MG tablet Take 1-2 tablets by mouth 2 times daily Take while on oral narcotics to prevent or treat constipation. 30 tablet 0     traZODone (DESYREL) 50 MG tablet Take 1 tablet (50 mg) by mouth At Bedtime 30 tablet 1     TURMERIC PO        furosemide (LASIX) 10 MG/ML solution Take 1 mL (10 mg) by mouth 2 times daily (Patient not taking: Reported on 2/15/2023) 10 mL 0     metoprolol succinate ER (TOPROL XL) 25 MG 24 hr tablet Take 0.5 tablets (12.5 mg) by mouth daily for 30 days 15 tablet 0      nitroGLYcerin (NITROSTAT) 0.4 MG sublingual tablet For chest pain place 1 tablet under the tongue every 5 minutes for 3 doses. If symptoms persist 5 minutes after 1st dose call 911. (Patient not taking: Reported on 2/15/2023) 25 tablet 0       ALLERGIES     Allergies   Allergen Reactions     No Known Allergies      Orlistat      Other reaction(s): Incontinence of feces       PAST MEDICAL HISTORY:  Past Medical History:   Diagnosis Date     Diverticulitis      Old myocardial infarction     s/p successful emergent revascualrization, PTCR/stent at the LAD for acute MI     Old myocardial infarction 13    Non-Q-wave MI-[place]     Other and unspecified hyperlipidemia      Tobacco use disorder        PAST SURGICAL HISTORY:  Past Surgical History:   Procedure Laterality Date     ARTHROPLASTY HIP Right 2018    Procedure: ARTHROPLASTY HIP;  right total hip arthroplasty;  Surgeon: Vineet Bedolla MD;  Location: [facility] OR     ARTHROPLASTY HIP Left 2022    Procedure: LEFT TOTAL HIP ARTHROPLASTY;  Surgeon: Vineet Briseno MD;  Location: [facility] OR     COLONOSCOPY N/A 2022    Procedure: COLONOSCOPY;  Surgeon: Mundo Velazquez MD;  Location:  GI     CV CORONARY ANGIOGRAM N/A 2022    Procedure: Coronary Angiogram;  Surgeon: Js Brady MD;  Location: [facility] CARDIAC CATH LAB     CV PCI N/A 2022    Procedure: Percutaneous Coronary Intervention;  Surgeon: Js Brady MD;  Location:  HEART CARDIAC CATH LAB     ZZC ANESTH,DX ARTHROSCOPIC PROC KNEE JOINT       ZZC COLOSTOMY       Z UNLISTED LAPAROSCOPY PROC,INTESTINE  2003    Exploratory Lap. Lysis of adhesions. Sigmoid colostomy takedown and stapled coloproctostomy.     ZZHC PTCA SINGLE VESSEL      2 stents       FAMILY HISTORY:  Family History   Problem Relation Age of Onset     Lipids Mother      Psychotic Disorder Mother         anxiety     Hypertension Mother      Prostate Cancer Father          at 74     Diabetes  "Sister        SOCIAL HISTORY:  Social History     Socioeconomic History     Marital status:      Spouse name: Farida     Number of children: 3     Years of education: 12     Highest education level: None   Occupational History     Occupation: install [employer]   Tobacco Use     Smoking status: Former     Types: Cigarettes     Quit date: 2000     Years since quittin.1     Smokeless tobacco: Former   Vaping Use     Vaping Use: Never used   Substance and Sexual Activity     Alcohol use: Not Currently     Comment: Occasional     Drug use: Not Currently     Sexual activity: Never     Partners: Female     Birth control/protection: Surgical   Other Topics Concern      Service Yes     Comment: Marine     Blood Transfusions No     Caffeine Concern No     Comment: Hasn't had caffeine for 2 weeks     Occupational Exposure No     Hobby Hazards No     Sleep Concern No     Stress Concern No     Weight Concern No     Special Diet Yes     Comment: Low Carb / High Protein     Back Care No     Exercise Yes     Comment: Walks     Seat Belt Yes       Review of Systems:  Skin:          Eyes:         ENT:         Respiratory:  Positive for cough     Cardiovascular:  Negative;palpitations;chest pain Positive for;edema    Gastroenterology:        Genitourinary:         Musculoskeletal:         Neurologic:         Psychiatric:         Heme/Lymph/Imm:         Endocrine:           Physical Exam:  Vitals: /82 (BP Location: Right arm, Patient Position: Sitting, Cuff Size: Adult Large)   Pulse 85   Ht 1.778 m (5' 10\")   Wt 115.5 kg (254 lb 9.6 oz)   SpO2 96%   BMI 36.53 kg/m      Constitutional:  cooperative, alert and oriented, well developed, well nourished, in no acute distress obese      Skin:  warm and dry to the touch, no apparent skin lesions or masses noted          Head:  normocephalic, no masses or lesions        Eyes:  sclera white;no xanthalasma        Lymph:      ENT:  no pallor or cyanosis   " masked    Neck:  JVP normal        Respiratory:  normal breath sounds, clear to auscultation, normal A-P diameter, normal symmetry, normal respiratory excursion, no use of accessory muscles         Cardiac: regular rhythm;normal S1 and S2;no murmurs, gallops or rubs detected   distant heart sounds            not assessed this visit                                        GI:  abdomen soft;BS normoactive        Extremities and Muscular Skeletal:  no edema;no deformities, clubbing, cyanosis, erythema observed   bilateral LE edema;trace;1+     support stockings are one    Neurological:  no gross motor deficits   uses a cane    Psych:  affect appropriate, oriented to time, person and place        CC  Pan Espinal MD  [address] [place] [id number]

## 2023-02-15 NOTE — PATIENT INSTRUCTIONS
Call my nurse with any questions or concerns:  [phone]  *If you have concerns after hours, please call [phone], option 2 to speak with on call Cardiologist.    1. Medication changes from today:    Increase metoprolol (Helps with pump of the heart)  Stop Lisinopril for 3 days BEFORE starting Entresto (for CHF)  Start Entresto 24/26 mg 1 tablet at bedtime for 1 week then 1 tablet twice daily     2000 mg of sodium daily is your daily limit

## 2023-02-15 NOTE — LETTER
2/15/2023    [facility]  [address]  [facility] [address]    RE: Markie Rico       Dear Colleague,     I had the pleasure of seeing Markie Rico in the [facility].  HPI:  Markie Rico is a delightful 62-year-old gentleman that I am having the pleasure of meeting today.  He is an established patient of Dr. Espinal.  He has a history of coronary artery disease with MI in 2000.  He underwent stenting of his LAD with a bare-metal stent at that time.  In 2007 the patient reported another stenting procedure.  In 2011 he underwent a stent to his RCA and LAD.  His EF was 45%.  In 2018 he underwent RCA PCI at an outside facility.  November 12, 2022 he underwent a hip arthroplasty at [facility].  On September 14 he developed chest pain with evidence of an acute anterior wall myocardial infarction.  He was brought to the Cath Lab at [facility] found to have an occlusion of his proximal LAD with no distal flow.  He underwent PTCA and with somewhat slow to distal flow at the conclusion of the case.    Follow-up echocardiogram showed an EF of 25 to 30%.  Aorta was measured at 4.6 cm.    He underwent a cardiac MRI 2/2/2023 which showed an EF of 31%.  He had apical, mid to distal anterior septal and anterior wall akinesis with walls thinned out.  RV function was borderline reduced at 53%.  Late gadolinium enhancement showed subendocardial delay and transmural pattern involving the anterior, mid to distal anterior septal and apical walls consistent with large size infarct in the LAD territory.  He had 33% scar noted.  The rest of the myocardium was essentially viable.  Stress perfusion imaging showed no ischemia.  Dr. Espinal reviewed the results and was pleased that the stress test showed no ischemia.    Currently Markie denies chest pain, orthopnea, PND, syncope.  He does get lower extremity edema bilaterally.  He continues to sleep in a recliner.  States that he tries to  elevate his legs as much possible during the night.  He admits to not following a low-sodium diet.  He was briefly placed on 5 days of Lasix when he suffered from RSV a month ago.  He felt that the Lasix was just starting to help when it was discontinued.    Plan:   1. anterior STEMI post left total hip 9/12/2022  Troponin > 125,000  Status post angiogram with balloon angioplasty of the previous ostial to mid LAD stent left circumflex OM1 disease.  RCA had mild disease.  Plavix was initially held for anemia.  Patient states that he recently was seen by PCP and hemoglobin was 14.  He remains on both aspirin and clopidogrel therapy.  Denies chest pain.  Metoprolol ER will be increased from 12.5 to 25 mg daily.  I would like to stop lisinopril in the near future and start Entresto 24/26 mg twice daily.  [facility]  [facility]    2. ischemic cardiomyopathy EF 31% on cMRI  Heart failure education was completed with Markie today.  I have asked that he try to follow a 2000 mg low-sodium diet.  Metoprolol ER will be increased to 25 mg daily.  I would like to try to initiate and uptitrate Entresto.  He would also benefit from an SGLT2 inhibitor in the future.  He has had a history of symptomatic hypotension, therefore, I will be very slow and uptitrating his medications.  I would like him to try to start a walking program.  In regard to his lower extremity edema I think this is multifactorial; sleeping in a recliner and a high sodium diet.  Entresto does often have a diuretic effect which hopefully will help offset his edema.  He currently is not on diuretic therapy    3.  Obesity BMI 36.5  Was previously on Ozempic but stopped due to medication shortage  Was recently started on another injectable for weight loss.    40 minutes was completed today greater than 50% of this was related to counseling, reviewing to cMRI and reviewing the medications and why they will hopefully be  beneficial in helping to preserve his cardiac function.  Patient did not participate in cardiac rehab because he was recovering from his total left hip replacement.  Once weather permits I do think it would be beneficial for him to be part of a walking program.  I would like to see if he can do cardiac rehab now that he is able to start walking.  He currently is using 1 cane.  I would like to see him back in 6 to 8 weeks with a BMP.  If there are questions or concerns of asked that he please contact us.  [facility] will [facility] [place] so they are aware of our discussion and the plan to optimize his heart failure medications.  Please feel free to contact us if you have questions or concerns regarding today's assessment and plan.    Patti Coleman, NP, APRN CNP      Today's clinic visit entailed:  Review of the result(s) of each unique test - labs a cMRI  Ordering of each unique test  Prescription drug management  No LOS data to display   Time spent doing chart review, history and exam, documentation and further activities per the note  Provider  Link to MDM Help Grid     The level of medical decision making during this visit was of moderate complexity.      Orders Placed This Encounter   Procedures     Basic metabolic panel     Follow-Up with Cardiology MIGUEL       Orders Placed This Encounter   Medications     DISCONTD: sacubitril-valsartan (ENTRESTO) 24-26 MG per tablet     Sig: Take 1 tablet by mouth 2 times daily     Dispense:  60 tablet     Refill:  4     metoprolol succinate ER (TOPROL XL) 25 MG 24 hr tablet     Sig: Take 1 tablet (25 mg) by mouth daily     Dispense:  90 tablet     Refill:  3     sacubitril-valsartan (ENTRESTO) 24-26 MG per tablet     Sig: Take 1 tablet by mouth 2 times daily     Dispense:  180 tablet     Refill:  3       Medications Discontinued During This Encounter   Medication Reason     oxyCODONE (ROXICODONE) 5 MG tablet Therapy completed (No AVS)     lisinopril (ZESTRIL) 5 MG  tablet Alternate therapy     metoprolol succinate ER (TOPROL XL) 25 MG 24 hr tablet      sacubitril-valsartan (ENTRESTO) 24-26 MG per tablet Reorder (No AVS / No eCancel)         Encounter Diagnoses   Name Primary?     Coronary artery disease involving native coronary artery of native heart without angina pectoris      Ischemic cardiomyopathy        CURRENT MEDICATIONS:  Current Outpatient Medications   Medication Sig Dispense Refill     acetaminophen (TYLENOL) 325 MG tablet Take 2 tablets (650 mg) by mouth every 6 hours as needed for other (mild pain) 100 tablet 0     aspirin (ASA) 81 MG EC tablet Take 1 tablet (81 mg) by mouth daily 30 tablet 0     atorvastatin (LIPITOR) 20 MG tablet Take 1 tablet (20 mg) by mouth daily 30 tablet 0     buPROPion (WELLBUTRIN SR) 150 MG 12 hr tablet Take 150 mg by mouth daily       Cinnamon Bark POWD        clopidogrel (PLAVIX) 75 MG tablet Take 1 tablet (75 mg) by mouth daily 30 tablet 0     MAGNESIUM OXIDE PO Take 500 mg by mouth daily       methocarbamol (ROBAXIN) 500 MG tablet Take 1 tablet (500 mg) by mouth 3 times daily as needed for muscle spasms 90 tablet 0     metoprolol succinate ER (TOPROL XL) 25 MG 24 hr tablet Take 1 tablet (25 mg) by mouth daily 90 tablet 3     Multiple Vitamins-Minerals (CENTRUM MEN PO) Take 1 tablet by mouth daily       order for DME Equipment being ordered: Walker ()  Treatment Diagnosis: s/p joint replacement 1 Device 0     sacubitril-valsartan (ENTRESTO) 24-26 MG per tablet Take 1 tablet by mouth 2 times daily 180 tablet 3     SEMAGLUTIDE, 1 MG/DOSE, SC Inject 1 mg Subcutaneous once a week Tuesdays       senna-docusate (SENOKOT-S/PERICOLACE) 8.6-50 MG tablet Take 1-2 tablets by mouth 2 times daily Take while on oral narcotics to prevent or treat constipation. 30 tablet 0     traZODone (DESYREL) 50 MG tablet Take 1 tablet (50 mg) by mouth At Bedtime 30 tablet 1     TURMERIC PO        furosemide (LASIX) 10 MG/ML solution Take 1 mL (10 mg) by  mouth 2 times daily (Patient not taking: Reported on 2/15/2023) 10 mL 0     metoprolol succinate ER (TOPROL XL) 25 MG 24 hr tablet Take 0.5 tablets (12.5 mg) by mouth daily for 30 days 15 tablet 0     nitroGLYcerin (NITROSTAT) 0.4 MG sublingual tablet For chest pain place 1 tablet under the tongue every 5 minutes for 3 doses. If symptoms persist 5 minutes after 1st dose call 911. (Patient not taking: Reported on 2/15/2023) 25 tablet 0       ALLERGIES     Allergies   Allergen Reactions     No Known Allergies      Orlistat      Other reaction(s): Incontinence of feces       PAST MEDICAL HISTORY:  Past Medical History:   Diagnosis Date     Diverticulitis      Old myocardial infarction     s/p successful emergent revascualrization, PTCR/stent at the LAD for acute MI     Old myocardial infarction 06/07/11    Non-Q-wave MI-[place]     Other and unspecified hyperlipidemia      Tobacco use disorder        PAST SURGICAL HISTORY:  Past Surgical History:   Procedure Laterality Date     ARTHROPLASTY HIP Right 2/12/2018    Procedure: ARTHROPLASTY HIP;  right total hip arthroplasty;  Surgeon: Vineet Bedolla MD;  Location: [facility] OR     ARTHROPLASTY HIP Left 9/12/2022    Procedure: LEFT TOTAL HIP ARTHROPLASTY;  Surgeon: Vineet Briseno MD;  Location: [facility] OR     COLONOSCOPY N/A 5/20/2022    Procedure: COLONOSCOPY;  Surgeon: Mundo Velazquez MD;  Location:  GI     CV CORONARY ANGIOGRAM N/A 9/12/2022    Procedure: Coronary Angiogram;  Surgeon: Js Brady MD;  Location: [facility] CARDIAC CATH LAB     CV PCI N/A 9/12/2022    Procedure: Percutaneous Coronary Intervention;  Surgeon: Js Brady MD;  Location:  HEART CARDIAC CATH LAB     Z ANESTH,DX ARTHROSCOPIC PROC KNEE JOINT       ZZC COLOSTOMY       Z UNLISTED LAPAROSCOPY PROC,INTESTINE  7/25/2003    Exploratory Lap. Lysis of adhesions. Sigmoid colostomy takedown and stapled coloproctostomy.     ZZ PTCA SINGLE VESSEL      2 stents       FAMILY  "HISTORY:  Family History   Problem Relation Age of Onset     Lipids Mother      Psychotic Disorder Mother         anxiety     Hypertension Mother      Prostate Cancer Father          at 74     Diabetes Sister        SOCIAL HISTORY:  Social History     Socioeconomic History     Marital status:      Spouse name: Farida     Number of children: 3     Years of education: 12     Highest education level: None   Occupational History     Occupation: [employer]   Tobacco Use     Smoking status: Former     Types: Cigarettes     Quit date: 2000     Years since quittin.1     Smokeless tobacco: Former   Vaping Use     Vaping Use: Never used   Substance and Sexual Activity     Alcohol use: Not Currently     Comment: Occasional     Drug use: Not Currently     Sexual activity: Never     Partners: Female     Birth control/protection: Surgical   Other Topics Concern      Service Yes     Comment: Marine     Blood Transfusions No     Caffeine Concern No     Comment: Hasn't had caffeine for 2 weeks     Occupational Exposure No     Hobby Hazards No     Sleep Concern No     Stress Concern No     Weight Concern No     Special Diet Yes     Comment: Low Carb / High Protein     Back Care No     Exercise Yes     Comment: Walks     Seat Belt Yes       Review of Systems:  Skin:          Eyes:         ENT:         Respiratory:  Positive for cough     Cardiovascular:  Negative;palpitations;chest pain Positive for;edema    Gastroenterology:        Genitourinary:         Musculoskeletal:         Neurologic:         Psychiatric:         Heme/Lymph/Imm:         Endocrine:           Physical Exam:  Vitals: /82 (BP Location: Right arm, Patient Position: Sitting, Cuff Size: Adult Large)   Pulse 85   Ht 1.778 m (5' 10\")   Wt 115.5 kg (254 lb 9.6 oz)   SpO2 96%   BMI 36.53 kg/m      Constitutional:  cooperative, alert and oriented, well developed, well nourished, in no acute distress obese      Skin:  warm and dry " to the touch, no apparent skin lesions or masses noted          Head:  normocephalic, no masses or lesions        Eyes:  sclera white;no xanthalasma        Lymph:      ENT:  no pallor or cyanosis   masked    Neck:  JVP normal        Respiratory:  normal breath sounds, clear to auscultation, normal A-P diameter, normal symmetry, normal respiratory excursion, no use of accessory muscles         Cardiac: regular rhythm;normal S1 and S2;no murmurs, gallops or rubs detected   distant heart sounds            not assessed this visit                                        GI:  abdomen soft;BS normoactive        Extremities and Muscular Skeletal:  no edema;no deformities, clubbing, cyanosis, erythema observed   bilateral LE edema;trace;1+     support stockings are one    Neurological:  no gross motor deficits   uses a cane    Psych:  affect appropriate, oriented to time, person and place        CC  Pan Espinal MD  [address] [place] [id number]    Thank you for allowing me to participate in the care of your patient.      Sincerely,     Patti Coleman NP, APRN CNP     [facility]

## 2023-04-06 ENCOUNTER — OFFICE VISIT (OUTPATIENT)
Dept: CARDIOLOGY | Facility: CLINIC | Age: 63
End: 2023-04-06
Attending: NURSE PRACTITIONER
Payer: COMMERCIAL

## 2023-04-06 VITALS
WEIGHT: 281.2 LBS | OXYGEN SATURATION: 97 % | BODY MASS INDEX: 40.26 KG/M2 | HEIGHT: 70 IN | DIASTOLIC BLOOD PRESSURE: 76 MMHG | SYSTOLIC BLOOD PRESSURE: 122 MMHG | HEART RATE: 78 BPM

## 2023-04-06 DIAGNOSIS — I10 BENIGN ESSENTIAL HYPERTENSION: ICD-10-CM

## 2023-04-06 DIAGNOSIS — G47.33 OSA (OBSTRUCTIVE SLEEP APNEA): ICD-10-CM

## 2023-04-06 DIAGNOSIS — I25.5 ISCHEMIC CARDIOMYOPATHY: Primary | ICD-10-CM

## 2023-04-06 DIAGNOSIS — I25.10 CORONARY ARTERY DISEASE INVOLVING NATIVE CORONARY ARTERY OF NATIVE HEART WITHOUT ANGINA PECTORIS: ICD-10-CM

## 2023-04-06 LAB
ANION GAP SERPL CALCULATED.3IONS-SCNC: 7 MMOL/L (ref 7–15)
BUN SERPL-MCNC: 14.7 MG/DL (ref 8–23)
CALCIUM SERPL-MCNC: 9.8 MG/DL (ref 8.8–10.2)
CHLORIDE SERPL-SCNC: 103 MMOL/L (ref 98–107)
CREAT SERPL-MCNC: 0.74 MG/DL (ref 0.67–1.17)
DEPRECATED HCO3 PLAS-SCNC: 30 MMOL/L (ref 22–29)
ERYTHROCYTE [DISTWIDTH] IN BLOOD BY AUTOMATED COUNT: 15 % (ref 10–15)
GFR SERPL CREATININE-BSD FRML MDRD: >90 ML/MIN/1.73M2
GLUCOSE SERPL-MCNC: 109 MG/DL (ref 70–99)
HCT VFR BLD AUTO: 48.5 % (ref 40–53)
HGB BLD-MCNC: 15.4 G/DL (ref 13.3–17.7)
MCH RBC QN AUTO: 28.1 PG (ref 26.5–33)
MCHC RBC AUTO-ENTMCNC: 31.8 G/DL (ref 31.5–36.5)
MCV RBC AUTO: 89 FL (ref 78–100)
PLATELET # BLD AUTO: 209 10E3/UL (ref 150–450)
POTASSIUM SERPL-SCNC: 4.8 MMOL/L (ref 3.4–5.3)
RBC # BLD AUTO: 5.48 10E6/UL (ref 4.4–5.9)
SODIUM SERPL-SCNC: 140 MMOL/L (ref 136–145)
WBC # BLD AUTO: 7.2 10E3/UL (ref 4–11)

## 2023-04-06 PROCEDURE — 80048 BASIC METABOLIC PNL TOTAL CA: CPT | Performed by: NURSE PRACTITIONER

## 2023-04-06 PROCEDURE — 85027 COMPLETE CBC AUTOMATED: CPT | Performed by: NURSE PRACTITIONER

## 2023-04-06 PROCEDURE — 36415 COLL VENOUS BLD VENIPUNCTURE: CPT | Performed by: NURSE PRACTITIONER

## 2023-04-06 PROCEDURE — 99214 OFFICE O/P EST MOD 30 MIN: CPT | Mod: 25 | Performed by: NURSE PRACTITIONER

## 2023-04-06 RX ORDER — SACUBITRIL AND VALSARTAN 49; 51 MG/1; MG/1
1 TABLET, FILM COATED ORAL 2 TIMES DAILY
Qty: 180 TABLET | Refills: 3 | Status: SHIPPED | OUTPATIENT
Start: 2023-04-06

## 2023-04-06 NOTE — PROGRESS NOTES
HPI:  Markie Rico is a delightful 62-year-old gentleman here today for follow-up. He is an established patient of Dr. Espinal.  He was referred to [facility] for optimization of his heart failure medications.      He has a history of coronary artery disease with MI in 2000.  He underwent stenting of his LAD with a bare-metal stent at that time.  In 2007 the patient reported another stenting procedure.  In 2011 he underwent a stent to his RCA and LAD.  His EF was 45%.  In 2018 he underwent RCA PCI at an outside facility.      In November 12, 2022 he underwent a hip arthroplasty at [facility].  On September 14 he developed chest pain with evidence of an acute anterior wall myocardial infarction.  He was brought to the Cath Lab at [facility] found to have an occlusion of his proximal LAD with no distal flow.  He underwent PTCA and with somewhat slow to distal flow at the conclusion of the case.    Other prevalent history includes hypertension, hyperlipidemia, RIKY, osteoarthritis of the left hip, morbid obesity, and depression.    Echocardiogram 9/2022 showed an EF of 25 to 30%. Aorta was measured at 4.6 cm.     He underwent a cardiac MRI 2/2/2023 which showed an EF of 31%.  He had apical, mid to distal anterior septal and anterior wall akinesis with walls thinned out.  RV function was borderline reduced at 53%.  Late gadolinium enhancement showed subendocardial delay and transmural pattern involving the anterior, mid to distal anterior septal and apical walls consistent with large size infarct in the LAD territory.  He had 33% scar noted.  The rest of the myocardium was essentially viable.  Stress perfusion imaging showed no ischemia.  Dr. Espinal reviewed the results and was pleased that the stress test showed no ischemia.    At her last visit I stopped lisinopril and initiated Entresto.  His weight today is 281 pounds.  His last weight was noted at 254 pounds, but the patient states that that was an  entry error.  His weight on his home scale today was 279.6 pounds.  He states he might be up 5 to 7 pounds, but not 25 pounds.    Since starting the Entresto he feels his lower extremity edema has improved.  He has not taken furosemide for some time.  He does have on bilateral support stockings.  He does watch his sodium intake and weighs at least 5 times a week.  Currently he denies chest pain, orthopnea, PND, syncope, lower extremity edema.  His energy level is still poor.  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    Lab work was completed after our visit and available at the time of my dictation.  Hemoglobin has normalized from 10.1-15.4.  His BMP remained stable.       Latest Reference Range & Units 04/06/23 11:47   Sodium 136 - 145 mmol/L 140   Potassium 3.4 - 5.3 mmol/L 4.8   Chloride 98 - 107 mmol/L 103   Carbon Dioxide (CO2) 22 - 29 mmol/L 30 (H)   Urea Nitrogen 8.0 - 23.0 mg/dL 14.7   Creatinine 0.67 - 1.17 mg/dL 0.74   GFR Estimate >60 mL/min/1.73m2 >90   Calcium 8.8 - 10.2 mg/dL 9.8   Anion Gap 7 - 15 mmol/L 7   Glucose 70 - 99 mg/dL 109 (H)   WBC 4.0 - 11.0 10e3/uL 7.2   Hemoglobin 13.3 - 17.7 g/dL 15.4   Hematocrit 40.0 - 53.0 % 48.5   Platelet Count 150 - 450 10e3/uL 209   RBC Count 4.40 - 5.90 10e6/uL 5.48   MCV 78 - 100 fL 89   MCH 26.5 - 33.0 pg 28.1   MCHC 31.5 - 36.5 g/dL 31.8   RDW 10.0 - 15.0 % 15.0     Cath 9/2022:    Ost Cx lesion is 70% stenosed.    Prox RCA lesion is 20% stenosed.    Mid RCA lesion is 40% stenosed.    Dist RCA lesion is 40% stenosed.    2nd Mrg-1 lesion is 70% stenosed.    2nd Mrg-2 lesion is 50% stenosed.    Ost LAD to Mid LAD lesion is 100% stenosed      Limited echo 11/2022:   The left ventricle is moderately dilated with LVEF is 33%. Large area of severe hypokinesis of mid-distal anteroseptal, apical, and distal inferior walls. No valve disease.     Plan:   1. CAD, and anterior wall myocardial infarction post left  hip 9/12/2022  No complaints of chest pain   Troponin was greater than 125,000  Status post successful PCI of ostial to mid LAD  with balloon angioplasty.  There was discussion about patient being referred for possible CABG, but viability study was completed.  Once medications are optimized I will leave that up to Dr. Espinal' discretion.  On aspirin and clopidogrel therapy.      2. ischemic cardiomyopathy  Metoprolol ER 25 mg once daily.  Entresto will be increased to 49/51 mg twice daily.  He will double up on his previous dose 24/26 mg tablets until [facility]  I would like to start Jardiance 10 mg daily.  He is not taking Lasix but uses this on a as needed basis.  He is euvolemic on exam.    3.  Morbid obesity  On semaglutide 1 mg weekly  Patient is also trying intermittent fasting to no avail  Hopeful the Jardiance will also assist with any type of insulin resistance.    4.  History of anemia resolved hemoglobin normalized    5.  RIKY but patient has not been using CPAP  He was unable to lay in bed, but recently transition from the recliner back to bed.  He plans to start reusing his CPAP machine.    I will see Markie back in 1 month with another BMP.  I called him to review his lab work with him following her visit.  Once his medications are optimized I will order a repeat echocardiogram and visit with Dr. Espinal.  Thank you for including me in his care.  Thank you for including us in his care.    Patti Coleman, NP, APRN CNP          Today's clinic visit entailed:  Review of the result(s) of each unique test - Labs and [facility]  Ordering of each unique test  Prescription drug management  No LOS data to display   Time spent by me doing chart review, history and exam, documentation and further activities per the note  Provider  Link to [facility] Help Grid     The level of medical decision making during this visit was of moderate complexity.      Orders Placed This Encounter    Procedures     CBC with platelets     Basic metabolic panel     Follow-Up with Cardiology MIGUEL       Orders Placed This Encounter   Medications     sacubitril-valsartan (ENTRESTO) 49-51 MG per tablet     Sig: Take 1 tablet by mouth 2 times daily     Dispense:  180 tablet     Refill:  3     empagliflozin (JARDIANCE) 10 MG TABS tablet     Sig: Take 1 tablet (10 mg) by mouth daily     Dispense:  90 tablet     Refill:  3       There are no discontinued medications.      Encounter Diagnoses   Name Primary?     Ischemic cardiomyopathy      Benign essential hypertension Yes       CURRENT MEDICATIONS:  Current Outpatient Medications   Medication Sig Dispense Refill     acetaminophen (TYLENOL) 325 MG tablet Take 2 tablets (650 mg) by mouth every 6 hours as needed for other (mild pain) 100 tablet 0     aspirin (ASA) 81 MG EC tablet Take 1 tablet (81 mg) by mouth daily 30 tablet 0     atorvastatin (LIPITOR) 20 MG tablet Take 1 tablet (20 mg) by mouth daily 30 tablet 0     buPROPion (WELLBUTRIN SR) 150 MG 12 hr tablet Take 150 mg by mouth daily       Cinnamon Bark POWD        clopidogrel (PLAVIX) 75 MG tablet Take 1 tablet (75 mg) by mouth daily 30 tablet 0     empagliflozin (JARDIANCE) 10 MG TABS tablet Take 1 tablet (10 mg) by mouth daily 90 tablet 3     MAGNESIUM OXIDE PO Take 500 mg by mouth daily       methocarbamol (ROBAXIN) 500 MG tablet Take 1 tablet (500 mg) by mouth 3 times daily as needed for muscle spasms 90 tablet 0     metoprolol succinate ER (TOPROL XL) 25 MG 24 hr tablet Take 1 tablet (25 mg) by mouth daily 90 tablet 3     Multiple Vitamins-Minerals (CENTRUM MEN PO) Take 1 tablet by mouth daily       order for DME Equipment being ordered: Walker ()  Treatment Diagnosis: s/p joint replacement 1 Device 0     sacubitril-valsartan (ENTRESTO) 24-26 MG per tablet Take 1 tablet by mouth 2 times daily 180 tablet 3     sacubitril-valsartan (ENTRESTO) 49-51 MG per tablet Take 1 tablet by mouth 2 times daily 180  tablet 3     SEMAGLUTIDE, 1 MG/DOSE, SC Inject 1 mg Subcutaneous once a week Tuesdays       senna-docusate (SENOKOT-S/PERICOLACE) 8.6-50 MG tablet Take 1-2 tablets by mouth 2 times daily Take while on oral narcotics to prevent or treat constipation. 30 tablet 0     traZODone (DESYREL) 50 MG tablet Take 1 tablet (50 mg) by mouth At Bedtime 30 tablet 1     TURMERIC PO        furosemide (LASIX) 10 MG/ML solution Take 1 mL (10 mg) by mouth 2 times daily (Patient not taking: Reported on 2/15/2023) 10 mL 0     metoprolol succinate ER (TOPROL XL) 25 MG 24 hr tablet Take 0.5 tablets (12.5 mg) by mouth daily for 30 days 15 tablet 0     nitroGLYcerin (NITROSTAT) 0.4 MG sublingual tablet For chest pain place 1 tablet under the tongue every 5 minutes for 3 doses. If symptoms persist 5 minutes after 1st dose call 911. (Patient not taking: Reported on 2/15/2023) 25 tablet 0       ALLERGIES     Allergies   Allergen Reactions     No Known Allergies      Orlistat      Other reaction(s): Incontinence of feces       PAST MEDICAL HISTORY:  Past Medical History:   Diagnosis Date     Diverticulitis      Old myocardial infarction     s/p successful emergent revascualrization, PTCR/stent at the LAD for acute MI     Old myocardial infarction 06/07/11    Non-Q-wave MI-[place]     Other and unspecified hyperlipidemia      Tobacco use disorder        PAST SURGICAL HISTORY:  Past Surgical History:   Procedure Laterality Date     ARTHROPLASTY HIP Right 2/12/2018    Procedure: ARTHROPLASTY HIP;  right total hip arthroplasty;  Surgeon: Vineet Bedolla MD;  Location:  OR     ARTHROPLASTY HIP Left 9/12/2022    Procedure: LEFT TOTAL HIP ARTHROPLASTY;  Surgeon: Vineet Briseno MD;  Location:  OR     COLONOSCOPY N/A 5/20/2022    Procedure: COLONOSCOPY;  Surgeon: Mundo Velazquez MD;  Location:  GI     CV CORONARY ANGIOGRAM N/A 9/12/2022    Procedure: Coronary Angiogram;  Surgeon: Js Brady MD;  Location:  HEART CARDIAC  CATH LAB     CV PCI N/A 2022    Procedure: Percutaneous Coronary Intervention;  Surgeon: Js Brady MD;  Location:  HEART CARDIAC CATH LAB     ZZC ANESTH,DX ARTHROSCOPIC PROC KNEE JOINT       ZZC COLOSTOMY       ZZC UNLISTED LAPAROSCOPY PROC,INTESTINE  2003    Exploratory Lap. Lysis of adhesions. Sigmoid colostomy takedown and stapled coloproctostomy.     ZZ PTCA SINGLE VESSEL      2 stents       FAMILY HISTORY:  Family History   Problem Relation Age of Onset     Lipids Mother      Psychotic Disorder Mother         anxiety     Hypertension Mother      Prostate Cancer Father          at 74     Diabetes Sister        SOCIAL HISTORY:  Social History     Socioeconomic History     Marital status:      Spouse name: Farida     Number of children: 3     Years of education: 12     Highest education level: None   Occupational History     Occupation: [employer]   Tobacco Use     Smoking status: Former     Types: Cigarettes     Quit date: 2000     Years since quittin.2     Smokeless tobacco: Former   Vaping Use     Vaping status: Never Used   Substance and Sexual Activity     Alcohol use: Not Currently     Comment: Occasional     Drug use: Not Currently     Sexual activity: Never     Partners: Female     Birth control/protection: Surgical   Other Topics Concern      Service Yes     Comment: Marine     Blood Transfusions No     Caffeine Concern No     Comment: Hasn't had caffeine for 2 weeks     Occupational Exposure No     Hobby Hazards No     Sleep Concern No     Stress Concern No     Weight Concern No     Special Diet Yes     Comment: Low Carb / High Protein     Back Care No     Exercise Yes     Comment: Walks     Seat Belt Yes       Review of Systems:  Skin:          Eyes:         ENT:         Respiratory:  Negative shortness of breath;cough;wheezing     Cardiovascular:  Negative;palpitations;chest pain;syncope or near-syncope Positive for;edema;lightheadedness;dizziness  "   Gastroenterology:        Genitourinary:         Musculoskeletal:         Neurologic:  Positive for numbness or tingling of hands    Psychiatric:         Heme/Lymph/Imm:  Positive for allergies    Endocrine:           Physical Exam:  Vitals: /76 (BP Location: Right arm, Patient Position: Sitting, Cuff Size: Adult Large)   Pulse 78   Ht 1.778 m (5' 10\")   Wt 127.6 kg (281 lb 3.2 oz)   SpO2 97%   BMI 40.35 kg/m      Constitutional:  cooperative, alert and oriented, well developed, well nourished, in no acute distress obese      Skin:  warm and dry to the touch, no apparent skin lesions or masses noted          Head:  normocephalic, no masses or lesions        Eyes:  sclera white;no xanthalasma        Lymph:      ENT:  no pallor or cyanosis   masked    Neck:  JVP normal        Respiratory:  normal breath sounds, clear to auscultation, normal A-P diameter, normal symmetry, normal respiratory excursion, no use of accessory muscles         Cardiac: regular rhythm;normal S1 and S2;no murmurs, gallops or rubs detected   distant heart sounds            not assessed this visit                                        GI:  abdomen soft;BS normoactive;no HSM obese      Extremities and Muscular Skeletal:  no edema;no deformities, clubbing, cyanosis, erythema observed         support stockings are one    Neurological:  no gross motor deficits   uses a cane    Psych:  affect appropriate, oriented to time, person and place        DIXIE Coleman, APRN CNP  [address]  [place] [id number]              "

## 2023-04-06 NOTE — LETTER
4/6/2023    [facility]  [address] [address]    RE: Markie Rico       Dear Colleague,     I had the pleasure of seeing Markie Rico in the [facility].  HPI:  Markie Rico is a delightful 62-year-old gentleman here today for follow-up. He is an established patient of Dr. Espinal.  He was referred to [facility] clinic for optimization of his heart failure medications.      He has a history of coronary artery disease with MI in 2000.  He underwent stenting of his LAD with a bare-metal stent at that time.  In 2007 the patient reported another stenting procedure.  In 2011 he underwent a stent to his RCA and LAD.  His EF was 45%.  In 2018 he underwent RCA PCI at an outside facility.      In November 12, 2022 he underwent a hip arthroplasty at [facility].  On September 14 he developed chest pain with evidence of an acute anterior wall myocardial infarction.  He was brought to the Cath Lab at [facility] found to have an occlusion of his proximal LAD with no distal flow.  He underwent PTCA and with somewhat slow to distal flow at the conclusion of the case.    Other prevalent history includes hypertension, hyperlipidemia, RIKY, osteoarthritis of the left hip, morbid obesity, and depression.    Echocardiogram 9/2022 showed an EF of 25 to 30%. Aorta was measured at 4.6 cm.     He underwent a cardiac MRI 2/2/2023 which showed an EF of 31%.  He had apical, mid to distal anterior septal and anterior wall akinesis with walls thinned out.  RV function was borderline reduced at 53%.  Late gadolinium enhancement showed subendocardial delay and transmural pattern involving the anterior, mid to distal anterior septal and apical walls consistent with large size infarct in the LAD territory.  He had 33% scar noted.  The rest of the myocardium was essentially viable.  Stress perfusion imaging showed no ischemia.  Dr. Espinal reviewed the results and was pleased that the stress  test showed no ischemia.    At her last visit I stopped lisinopril and initiated Entresto.  His weight today is 281 pounds.  His last weight was noted at 254 pounds, but the patient states that that was an entry error.  His weight on his home scale today was 279.6 pounds.  He states he might be up 5 to 7 pounds, but not 25 pounds.    Since starting the Entresto he feels his lower extremity edema has improved.  He has not taken furosemide for some time.  He does have on bilateral support stockings.  He does watch his sodium intake and weighs at least 5 times a week.  Currently he denies chest pain, orthopnea, PND, syncope, lower extremity edema.  His energy level is still poor.  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    Lab work was completed after our visit and available at the time of my dictation.  Hemoglobin has normalized from 10.1-15.4.  His BMP remained stable.       Latest Reference Range & Units 04/06/23 11:47   Sodium 136 - 145 mmol/L 140   Potassium 3.4 - 5.3 mmol/L 4.8   Chloride 98 - 107 mmol/L 103   Carbon Dioxide (CO2) 22 - 29 mmol/L 30 (H)   Urea Nitrogen 8.0 - 23.0 mg/dL 14.7   Creatinine 0.67 - 1.17 mg/dL 0.74   GFR Estimate >60 mL/min/1.73m2 >90   Calcium 8.8 - 10.2 mg/dL 9.8   Anion Gap 7 - 15 mmol/L 7   Glucose 70 - 99 mg/dL 109 (H)   WBC 4.0 - 11.0 10e3/uL 7.2   Hemoglobin 13.3 - 17.7 g/dL 15.4   Hematocrit 40.0 - 53.0 % 48.5   Platelet Count 150 - 450 10e3/uL 209   RBC Count 4.40 - 5.90 10e6/uL 5.48   MCV 78 - 100 fL 89   MCH 26.5 - 33.0 pg 28.1   MCHC 31.5 - 36.5 g/dL 31.8   RDW 10.0 - 15.0 % 15.0     Cath 9/2022:  Ost Cx lesion is 70% stenosed.  Prox RCA lesion is 20% stenosed.  Mid RCA lesion is 40% stenosed.  Dist RCA lesion is 40% stenosed.  2nd Mrg-1 lesion is 70% stenosed.  2nd Mrg-2 lesion is 50% stenosed.  Ost LAD to Mid LAD lesion is 100% stenosed      Limited echo 11/2022:   The left ventricle is moderately dilated with LVEF  is 33%. Large area of severe hypokinesis of mid-distal anteroseptal, apical, and distal inferior walls. No valve disease.     Plan:   1. CAD, and anterior wall myocardial infarction post left hip 9/12/2022  No complaints of chest pain   Troponin was greater than 125,000  Status post successful PCI of ostial to mid LAD  with balloon angioplasty.  There was discussion about patient being referred for possible CABG, but viability study was completed.  Once medications are optimized I will leave that up to Dr. Espinal' discretion.  On aspirin and clopidogrel therapy.      2. ischemic cardiomyopathy  Metoprolol ER 25 mg once daily.  Entresto will be increased to 49/51 mg twice daily.  He will double up on his previous dose 24/26 mg tablets [facility]  I would like to start Jardiance 10 mg daily.  He is not taking Lasix but uses this on a as needed basis.  He is euvolemic on exam.    3.  Morbid obesity  On semaglutide 1 mg weekly  Patient is also trying intermittent fasting to no avail  Hopeful the Jardiance will also assist with any type of insulin resistance.    4.  History of anemia resolved hemoglobin normalized    5.  RIKY but patient has not been using CPAP  He was unable to lay in bed, but recently transition from the recliner back to bed.  He plans to start reusing his CPAP machine.    I will see Markie back in 1 month with another BMP.  I called him to review his lab work with him following her visit.  Once his medications are optimized I will order a repeat echocardiogram and visit with Dr. Espinal.  Thank you for including me in his care.  Thank you for including us in his care.    Patti Coleman, CHALO, APRN CNP          Today's clinic visit entailed:  Review of the result(s) of each unique test - Labs and [facility]  Ordering of each unique test  Prescription drug management  No LOS data to display   Time spent by me doing chart review, history and exam, documentation and  further activities per the note  Provider  Link to MDM Help Grid     The level of medical decision making during this visit was of moderate complexity.      Orders Placed This Encounter   Procedures    CBC with platelets    Basic metabolic panel    Follow-Up with Cardiology MIGUEL       Orders Placed This Encounter   Medications    sacubitril-valsartan (ENTRESTO) 49-51 MG per tablet     Sig: Take 1 tablet by mouth 2 times daily     Dispense:  180 tablet     Refill:  3    empagliflozin (JARDIANCE) 10 MG TABS tablet     Sig: Take 1 tablet (10 mg) by mouth daily     Dispense:  90 tablet     Refill:  3       There are no discontinued medications.      Encounter Diagnoses   Name Primary?    Ischemic cardiomyopathy     Benign essential hypertension Yes       CURRENT MEDICATIONS:  Current Outpatient Medications   Medication Sig Dispense Refill    acetaminophen (TYLENOL) 325 MG tablet Take 2 tablets (650 mg) by mouth every 6 hours as needed for other (mild pain) 100 tablet 0    aspirin (ASA) 81 MG EC tablet Take 1 tablet (81 mg) by mouth daily 30 tablet 0    atorvastatin (LIPITOR) 20 MG tablet Take 1 tablet (20 mg) by mouth daily 30 tablet 0    buPROPion (WELLBUTRIN SR) 150 MG 12 hr tablet Take 150 mg by mouth daily      Cinnamon Bark POWD       clopidogrel (PLAVIX) 75 MG tablet Take 1 tablet (75 mg) by mouth daily 30 tablet 0    empagliflozin (JARDIANCE) 10 MG TABS tablet Take 1 tablet (10 mg) by mouth daily 90 tablet 3    MAGNESIUM OXIDE PO Take 500 mg by mouth daily      methocarbamol (ROBAXIN) 500 MG tablet Take 1 tablet (500 mg) by mouth 3 times daily as needed for muscle spasms 90 tablet 0    metoprolol succinate ER (TOPROL XL) 25 MG 24 hr tablet Take 1 tablet (25 mg) by mouth daily 90 tablet 3    Multiple Vitamins-Minerals (CENTRUM MEN PO) Take 1 tablet by mouth daily      order for DME Equipment being ordered: Walker ()  Treatment Diagnosis: s/p joint replacement 1 Device 0    sacubitril-valsartan (ENTRESTO)  24-26 MG per tablet Take 1 tablet by mouth 2 times daily 180 tablet 3    sacubitril-valsartan (ENTRESTO) 49-51 MG per tablet Take 1 tablet by mouth 2 times daily 180 tablet 3    SEMAGLUTIDE, 1 MG/DOSE, SC Inject 1 mg Subcutaneous once a week Tuesdays      senna-docusate (SENOKOT-S/PERICOLACE) 8.6-50 MG tablet Take 1-2 tablets by mouth 2 times daily Take while on oral narcotics to prevent or treat constipation. 30 tablet 0    traZODone (DESYREL) 50 MG tablet Take 1 tablet (50 mg) by mouth At Bedtime 30 tablet 1    TURMERIC PO       furosemide (LASIX) 10 MG/ML solution Take 1 mL (10 mg) by mouth 2 times daily (Patient not taking: Reported on 2/15/2023) 10 mL 0    metoprolol succinate ER (TOPROL XL) 25 MG 24 hr tablet Take 0.5 tablets (12.5 mg) by mouth daily for 30 days 15 tablet 0    nitroGLYcerin (NITROSTAT) 0.4 MG sublingual tablet For chest pain place 1 tablet under the tongue every 5 minutes for 3 doses. If symptoms persist 5 minutes after 1st dose call 911. (Patient not taking: Reported on 2/15/2023) 25 tablet 0       ALLERGIES     Allergies   Allergen Reactions    No Known Allergies     Orlistat      Other reaction(s): Incontinence of feces       PAST MEDICAL HISTORY:  Past Medical History:   Diagnosis Date    Diverticulitis     Old myocardial infarction     s/p successful emergent revascualrization, PTCR/stent at the LAD for acute MI    Old myocardial infarction 06/07/11    Non-Q-wave MI-[place]    Other and unspecified hyperlipidemia     Tobacco use disorder        PAST SURGICAL HISTORY:  Past Surgical History:   Procedure Laterality Date    ARTHROPLASTY HIP Right 2/12/2018    Procedure: ARTHROPLASTY HIP;  right total hip arthroplasty;  Surgeon: Vineet Bedolla MD;  Location: [facility] OR    ARTHROPLASTY HIP Left 9/12/2022    Procedure: LEFT TOTAL HIP ARTHROPLASTY;  Surgeon: Vineet Briseno MD;  Location: [facility] OR    COLONOSCOPY N/A 5/20/2022    Procedure: COLONOSCOPY;  Surgeon: Mundo Velazquez MD;   Location:  GI    CV CORONARY ANGIOGRAM N/A 2022    Procedure: Coronary Angiogram;  Surgeon: Js Brady MD;  Location:  HE[facility] CARDIAC CATH LAB    CV PCI N/A 2022    Procedure: Percutaneous Coronary Intervention;  Surgeon: Js Brady MD;  Location:  HEART CARDIAC CATH LAB    ZZC ANESTH,DX ARTHROSCOPIC PROC KNEE JOINT      ZZC COLOSTOMY      ZZC UNLISTED LAPAROSCOPY PROC,INTESTINE  2003    Exploratory Lap. Lysis of adhesions. Sigmoid colostomy takedown and stapled coloproctostomy.    ZZ PTCA SINGLE VESSEL      2 stents       FAMILY HISTORY:  Family History   Problem Relation Age of Onset    Lipids Mother     Psychotic Disorder Mother         anxiety    Hypertension Mother     Prostate Cancer Father          at 74    Diabetes Sister        SOCIAL HISTORY:  Social History     Socioeconomic History    Marital status:      Spouse name: Farida    Number of children: 3    Years of education: 12    Highest education level: None   Occupational History    Occupation: [employer]   Tobacco Use    Smoking status: Former     Types: Cigarettes     Quit date: 2000     Years since quittin.2    Smokeless tobacco: Former   Vaping Use    Vaping status: Never Used   Substance and Sexual Activity    Alcohol use: Not Currently     Comment: Occasional    Drug use: Not Currently    Sexual activity: Never     Partners: Female     Birth control/protection: Surgical   Other Topics Concern     Service Yes     Comment: [employer]    Blood Transfusions No    Caffeine Concern No     Comment: Hasn't had caffeine for 2 weeks    Occupational Exposure No    Hobby Hazards No    Sleep Concern No    Stress Concern No    Weight Concern No    Special Diet Yes     Comment: Low Carb / High Protein    Back Care No    Exercise Yes     Comment: Walks    Seat Belt Yes       Review of Systems:  Skin:          Eyes:         ENT:         Respiratory:  Negative shortness of breath;cough;wheezing    "  Cardiovascular:  Negative;palpitations;chest pain;syncope or near-syncope Positive for;edema;lightheadedness;dizziness    Gastroenterology:        Genitourinary:         Musculoskeletal:         Neurologic:  Positive for numbness or tingling of hands    Psychiatric:         Heme/Lymph/Imm:  Positive for allergies    Endocrine:           Physical Exam:  Vitals: /76 (BP Location: Right arm, Patient Position: Sitting, Cuff Size: Adult Large)   Pulse 78   Ht 1.778 m (5' 10\")   Wt 127.6 kg (281 lb 3.2 oz)   SpO2 97%   BMI 40.35 kg/m      Constitutional:  cooperative, alert and oriented, well developed, well nourished, in no acute distress obese      Skin:  warm and dry to the touch, no apparent skin lesions or masses noted          Head:  normocephalic, no masses or lesions        Eyes:  sclera white;no xanthalasma        Lymph:      ENT:  no pallor or cyanosis   masked    Neck:  JVP normal        Respiratory:  normal breath sounds, clear to auscultation, normal A-P diameter, normal symmetry, normal respiratory excursion, no use of accessory muscles         Cardiac: regular rhythm;normal S1 and S2;no murmurs, gallops or rubs detected   distant heart sounds            not assessed this visit                                        GI:  abdomen soft;BS normoactive;no HSM obese      Extremities and Muscular Skeletal:  no edema;no deformities, clubbing, cyanosis, erythema observed         support stockings are one    Neurological:  no gross motor deficits   uses a cane    Psych:  affect appropriate, oriented to time, person and place        CC  Patti Coleman, ANY CNP  [address]  [place]  [place] [address]          Thank you for allowing me to participate in the care of your patient.      Sincerely,     Patti Coleman, NP, APRN CNP     [facility]  "

## 2023-04-06 NOTE — PATIENT INSTRUCTIONS
Call my nurse with any questions or concerns: [phone]  *If you have concerns after hours, please call [phone], option 2 to speak with on call Cardiologist.    1. Medication changes from today:    Current dose Entresto 24/26 mg, increase to 2 tablets twice daily.   The new prescription is 49/51 mg twice daily  Start Jardiance 10 mg once daily  Recheck blood work at our next visit in 4-6 weeks      2. Lab Results:    Labs drawn today

## 2023-05-10 ENCOUNTER — TELEPHONE (OUTPATIENT)
Dept: CARDIOLOGY | Facility: CLINIC | Age: 63
End: 2023-05-10

## 2023-05-10 NOTE — TELEPHONE ENCOUNTER
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 Patient was last seen by Patti Coleman NP on 4/6/23 who requested patient to return to clinic in 1 month with [facility]. Patient was scheduled for follow up visit today 5/10/23 with Senait Mann NP but rescheduled visit for the end of June with Senait Mann NP. Tried to reach patient to see when he needs information sent to DOT provider Dr. Caraballo by. Patient also requested an echocardiogram report to be fax to Dr. Caraballo, his most recent echocardiogram was a limited and done on 11/17/22. Wanted to confirm with patient that this was the report he would like to be faxed. Left detailed message for patient to call back and to possibly move up his appointment with either Patti Coleman NP or Senait Mann NP to further discuss DOT clearance.     Patti Coleman NP LOV note 4/6/23:  Plan:   1. CAD, and anterior wall myocardial infarction post left hip 9/12/2022  No complaints of chest pain   Troponin was greater than 125,000  Status post successful PCI of ostial to mid LAD  with balloon angioplasty.  There was discussion about patient being referred for possible CABG, but viability study was completed.  Once medications are optimized I will leave that up to Dr. Espinal' discretion.  On aspirin and clopidogrel therapy.     2. ischemic cardiomyopathy  Metoprolol ER 25 mg once daily.  Entresto will be increased to 49/51 mg twice daily.  He will double up on his previous dose 24/26 mg tablets [facility]  I would like to start Jardiance 10 mg daily.  He is not taking Lasix but uses this on a as needed basis.  He is euvolemic on exam.     3.  Morbid obesity  On semaglutide 1 mg weekly  Patient is also trying intermittent fasting to no avail  Hopeful the Jardiance will also assist with any type of insulin resistance.     4.  History of anemia resolved hemoglobin normalized     5.  RIKY but patient has not been using CPAP  He was unable to lay in bed, but  recently transition from the recliner back to bed.  He plans to start reusing his CPAP machine.     I will see Markie back in 1 month with another BMP.  I called him to review his lab work with him following her visit.  Once his medications are optimized I will order a repeat echocardiogram and visit with Dr. Espinal.  Thank you for including me in his care.  Thank you for including us in his care.     Patti Coleman NP, APRN CNP

## 2023-05-11 NOTE — TELEPHONE ENCOUNTER
Second attempt to call patient, no answer. Left voicemail and requested patient call back at [phone].      Patricia Chaparor RN   [facility]

## 2023-05-11 NOTE — TELEPHONE ENCOUNTER
Patient returned call and moved follow-up appointment with Patti Coleman CNP up to 5/18/23. Patient states that he would like DOT clearance prior to that appointment if at all possible. He states that he would have work as soon as Monday if he can get DOT clearance. He also confirmed that the echo results from 11/2/22 will need to be sent. Can DOT clearance be provided prior to follow-up appointment? Please advise.     Patricia Chaparro RN   [facility]

## 2023-05-18 ENCOUNTER — OFFICE VISIT (OUTPATIENT)
Dept: CARDIOLOGY | Facility: CLINIC | Age: 63
End: 2023-05-18

## 2023-05-18 VITALS
WEIGHT: 282.3 LBS | HEART RATE: 88 BPM | DIASTOLIC BLOOD PRESSURE: 82 MMHG | HEIGHT: 70 IN | OXYGEN SATURATION: 96 % | BODY MASS INDEX: 40.41 KG/M2 | SYSTOLIC BLOOD PRESSURE: 118 MMHG

## 2023-05-18 DIAGNOSIS — I50.23 ACUTE ON CHRONIC SYSTOLIC HEART FAILURE (H): ICD-10-CM

## 2023-05-18 DIAGNOSIS — I25.5 ISCHEMIC CARDIOMYOPATHY: Primary | ICD-10-CM

## 2023-05-18 DIAGNOSIS — I10 BENIGN ESSENTIAL HYPERTENSION: ICD-10-CM

## 2023-05-18 DIAGNOSIS — I25.10 CORONARY ARTERY DISEASE INVOLVING NATIVE CORONARY ARTERY OF NATIVE HEART WITHOUT ANGINA PECTORIS: ICD-10-CM

## 2023-05-18 LAB
ANION GAP SERPL CALCULATED.3IONS-SCNC: 12 MMOL/L (ref 7–15)
BUN SERPL-MCNC: 19.2 MG/DL (ref 8–23)
CALCIUM SERPL-MCNC: 9.6 MG/DL (ref 8.8–10.2)
CHLORIDE SERPL-SCNC: 103 MMOL/L (ref 98–107)
CREAT SERPL-MCNC: 0.7 MG/DL (ref 0.67–1.17)
DEPRECATED HCO3 PLAS-SCNC: 24 MMOL/L (ref 22–29)
GFR SERPL CREATININE-BSD FRML MDRD: >90 ML/MIN/1.73M2
GLUCOSE SERPL-MCNC: 95 MG/DL (ref 70–99)
NT-PROBNP SERPL-MCNC: 363 PG/ML (ref 0–900)
POTASSIUM SERPL-SCNC: 4.7 MMOL/L (ref 3.4–5.3)
SODIUM SERPL-SCNC: 139 MMOL/L (ref 136–145)

## 2023-05-18 PROCEDURE — 80048 BASIC METABOLIC PNL TOTAL CA: CPT | Performed by: NURSE PRACTITIONER

## 2023-05-18 PROCEDURE — 83880 ASSAY OF NATRIURETIC PEPTIDE: CPT | Performed by: NURSE PRACTITIONER

## 2023-05-18 PROCEDURE — 99214 OFFICE O/P EST MOD 30 MIN: CPT | Mod: 25 | Performed by: NURSE PRACTITIONER

## 2023-05-18 PROCEDURE — 36415 COLL VENOUS BLD VENIPUNCTURE: CPT | Performed by: NURSE PRACTITIONER

## 2023-05-18 RX ORDER — METOPROLOL SUCCINATE 25 MG/1
25 TABLET, EXTENDED RELEASE ORAL 2 TIMES DAILY
Qty: 180 TABLET | Refills: 3 | Status: SHIPPED | OUTPATIENT
Start: 2023-05-18

## 2023-05-18 NOTE — LETTER
5/18/2023    [facility]  [address]  [facility] [address]    RE: Markie Rico       Dear Colleague,     I had the pleasure of seeing Markie Rico in the [facility].  HPI:  Markie Rico is a delightful 62-year-old gentleman here today for follow-up. He is an established patient of Dr. Espinal.  He was referred to [facility] for optimization of his heart failure medications.       He has a history of coronary artery disease with MI in 2000.  He underwent stenting of his LAD with a bare-metal stent at that time.  In 2007 the patient reported another stenting procedure.  In 2011 he underwent a stent to his RCA and LAD.  His EF was 45%.  In 2018 he underwent RCA PCI at an outside facility.       In November 12, 2022 he underwent a hip arthroplasty at [facility].  On September 14 he developed chest pain with evidence of an acute anterior wall myocardial infarction.  He was brought to the Cath Lab at [facility] found to have an occlusion of his proximal LAD with no distal flow.  He underwent PTCA and with somewhat slow to distal flow at the conclusion of the case.  Last ejection fraction was noted at 33% (11/22).     Other prevalent history includes hypertension, hyperlipidemia, RIKY, osteoarthritis of the left hip, morbid obesity, and depression.     At today's visit he denies chest pain, shortness of breath, palpitations, lightheadedness, dizziness, syncope, or lower extremity edema.  Since starting his Jardiance he states that his leg edema has improved significantly.  He has not taken Lasix for several months.  His weight at home is down about 4 pounds.  He is also walking about a half a mile a day.  He is hopeful to start a part-time job [place] a [place] the cities.  He has his DOT physical coming up.      cMRI 2/2/2023 which showed an EF of 31%.  He had apical, mid to distal anterior septal and anterior wall akinesis with walls thinned out. RV function was  borderline reduced at 53%.  Late gadolinium enhancement showed subendocardial delay and transmural pattern involving the anterior, mid to distal anterior septal and apical walls consistent with large size infarct in the LAD territory.  He had 33% scar noted.  The rest of the myocardium was essentially viable.  Stress perfusion imaging showed no ischemia.        Cath 9/2022:  Ost Cx lesion is 70% stenosed.  Prox RCA lesion is 20% stenosed.  Mid RCA lesion is 40% stenosed.  Dist RCA lesion is 40% stenosed.  2nd Mrg-1 lesion is 70% stenosed.  2nd Mrg-2 lesion is 50% stenosed.  Ost LAD to Mid LAD lesion is 100% stenosed        Limited echo 11/2022:   The left ventricle is moderately dilated with LVEF is 33%. Large area of severe hypokinesis of mid-distal anteroseptal, apical, and distal inferior walls. No valve disease     Latest Reference Range & Units 05/18/23 09:58   Sodium 136 - 145 mmol/L 139   Potassium 3.4 - 5.3 mmol/L 4.7   Chloride 98 - 107 mmol/L 103   Carbon Dioxide (CO2) 22 - 29 mmol/L 24   Urea Nitrogen 8.0 - 23.0 mg/dL 19.2   Creatinine 0.67 - 1.17 mg/dL 0.70   GFR Estimate >60 mL/min/1.73m2 >90   Calcium 8.8 - 10.2 mg/dL 9.6   Anion Gap 7 - 15 mmol/L 12   Glucose 70 - 99 mg/dL 95     Component      Latest Ref Rng 10/2/2022  10:30 PM   N-Terminal Pro BNP Inpatient      0 - 900 pg/mL 5,561 (H)       Plan:   1. CAD, and anterior wall myocardial infarction post left hip 9/12/2022  No complaints of chest pain   Status post successful PCI of ostial to mid LAD  with balloon angioplasty.  There was discussion about patient being referred for possible CABG, but viability study was completed.  Once medications are optimized I will leave that up to Dr. Espinal' discretion.  On aspirin and clopidogrel therapy.        2. ischemic cardiomyopathy EF 33%  Metoprolol ER 25 mg once daily, Entresto 49/51 mg twice daily, and Jardiance 10 mg daily was started at about 1 week ago.  [facility]  He  is not taking Lasix but and has not required it in months.  He is euvolemic on exam.  I have opted to increase metoprolol ER to 25 mg twice daily.  I have opted to add a proBNP to his lab work.  I am curious to see if he is trending down on GDMT     3.  Morbid obesity  On semaglutide 1 mg weekly  Patient is also trying intermittent fasting to no avail  Hopeful the Jardiance will also assist with any type of insulin resistance.     4.  History of anemia resolved hemoglobin normalized     5.  RIKY but patient has not been using CPAP    6.  Hypertension stable    I encouraged him to continue to increase his exercise.  He will follow-up in 3 months to see Dr. Espinal and repeat a limited echo to reassess LV function now that his medications have been optimized.  I am cautiously optimistic that his EF will improve.  His functional class is between 1 and 2.    Thank you for including us in his care.  A copy of his cardiac MRI, echo and lab work were given to the patient along with a brief letter stating he is stable from a cardiac standpoint for his DOT physical.    Patti Coleman, CHALO, APRN CNP      Today's clinic visit entailed:  Review of prior external note(s) from - cMRI, labs and echo  Prescription drug management  I spent a total of 30 minutes on the day of the visit.   Time spent by me doing chart review, history and exam, documentation and further activities per the note  Provider  Link to [facility] Help Grid     The level of medical decision making during this visit was of moderate complexity.      Orders Placed This Encounter   Procedures    N terminal pro BNP outpatient    Follow-Up with Cardiology    Echocardiogram Limited       Orders Placed This Encounter   Medications    metoprolol succinate ER (TOPROL XL) 25 MG 24 hr tablet     Sig: Take 1 tablet (25 mg) by mouth 2 times daily     Dispense:  180 tablet     Refill:  3       Medications Discontinued During This Encounter   Medication Reason    metoprolol succinate  ER (TOPROL XL) 25 MG 24 hr tablet Reorder (No AVS / No eCancel)         Encounter Diagnoses   Name Primary?    Ischemic cardiomyopathy     Acute on chronic systolic heart failure (H) Yes       CURRENT MEDICATIONS:  Current Outpatient Medications   Medication Sig Dispense Refill    acetaminophen (TYLENOL) 325 MG tablet Take 2 tablets (650 mg) by mouth every 6 hours as needed for other (mild pain) 100 tablet 0    aspirin (ASA) 81 MG EC tablet Take 1 tablet (81 mg) by mouth daily 30 tablet 0    atorvastatin (LIPITOR) 20 MG tablet Take 1 tablet (20 mg) by mouth daily 30 tablet 0    buPROPion (WELLBUTRIN SR) 150 MG 12 hr tablet Take 150 mg by mouth daily      Cinnamon Bark POWD       clopidogrel (PLAVIX) 75 MG tablet Take 1 tablet (75 mg) by mouth daily 30 tablet 0    empagliflozin (JARDIANCE) 10 MG TABS tablet Take 1 tablet (10 mg) by mouth daily 90 tablet 3    MAGNESIUM OXIDE PO Take 500 mg by mouth daily      methocarbamol (ROBAXIN) 500 MG tablet Take 1 tablet (500 mg) by mouth 3 times daily as needed for muscle spasms 90 tablet 0    metoprolol succinate ER (TOPROL XL) 25 MG 24 hr tablet Take 1 tablet (25 mg) by mouth 2 times daily 180 tablet 3    Multiple Vitamins-Minerals (CENTRUM MEN PO) Take 1 tablet by mouth daily      order for DME Equipment being ordered: Walker ()  Treatment Diagnosis: s/p joint replacement 1 Device 0    sacubitril-valsartan (ENTRESTO) 49-51 MG per tablet Take 1 tablet by mouth 2 times daily 180 tablet 3    SEMAGLUTIDE, 1 MG/DOSE, SC Inject 1 mg Subcutaneous once a week Tuesdays      senna-docusate (SENOKOT-S/PERICOLACE) 8.6-50 MG tablet Take 1-2 tablets by mouth 2 times daily Take while on oral narcotics to prevent or treat constipation. 30 tablet 0    traZODone (DESYREL) 50 MG tablet Take 1 tablet (50 mg) by mouth At Bedtime 30 tablet 1    TURMERIC PO       furosemide (LASIX) 10 MG/ML solution Take 1 mL (10 mg) by mouth 2 times daily (Patient not taking: Reported on 2/15/2023) 10 mL 0     metoprolol succinate ER (TOPROL XL) 25 MG 24 hr tablet Take 0.5 tablets (12.5 mg) by mouth daily for 30 days 15 tablet 0    nitroGLYcerin (NITROSTAT) 0.4 MG sublingual tablet For chest pain place 1 tablet under the tongue every 5 minutes for 3 doses. If symptoms persist 5 minutes after 1st dose call 911. (Patient not taking: Reported on 2/15/2023) 25 tablet 0       ALLERGIES     Allergies   Allergen Reactions    No Known Allergies     Orlistat      Other reaction(s): Incontinence of feces       PAST MEDICAL HISTORY:  Past Medical History:   Diagnosis Date    Diverticulitis     Old myocardial infarction     s/p successful emergent revascualrization, PTCR/stent at the LAD for acute MI    Old myocardial infarction 06/07/11    Non-Q-wave MI-[place]    Other and unspecified hyperlipidemia     Tobacco use disorder        PAST SURGICAL HISTORY:  Past Surgical History:   Procedure Laterality Date    ARTHROPLASTY HIP Right 2/12/2018    Procedure: ARTHROPLASTY HIP;  right total hip arthroplasty;  Surgeon: Vineet Bedolla MD;  Location: [facility] OR    ARTHROPLASTY HIP Left 9/12/2022    Procedure: LEFT TOTAL HIP ARTHROPLASTY;  Surgeon: Vineet Briseno MD;  Location: [facility] OR    COLONOSCOPY N/A 5/20/2022    Procedure: COLONOSCOPY;  Surgeon: Mundo Velazquez MD;  Location: [facility] GI    CV CORONARY ANGIOGRAM N/A 9/12/2022    Procedure: Coronary Angiogram;  Surgeon: Js Brady MD;  Location: [facility] CARDIAC CATH LAB    CV PCI N/A 9/12/2022    Procedure: Percutaneous Coronary Intervention;  Surgeon: Js Brady MD;  Location:  HEART CARDIAC CATH LAB    ZZC ANESTH,DX ARTHROSCOPIC PROC KNEE JOINT      ZZC COLOSTOMY      Z UNLISTED LAPAROSCOPY PROC,INTESTINE  7/25/2003    Exploratory Lap. Lysis of adhesions. Sigmoid colostomy takedown and stapled coloproctostomy.    ZZHC PTCA SINGLE VESSEL      2 stents       FAMILY HISTORY:  Family History   Problem Relation Age of Onset    Lipids Mother     Psychotic Disorder  "Mother         anxiety    Hypertension Mother     Prostate Cancer Father          at 74    Diabetes Sister        SOCIAL HISTORY:  Social History     Socioeconomic History    Marital status:      Spouse name: Farida    Number of children: 3    Years of education: 12    Highest education level: None   Occupational History    Occupation: [employer]   Tobacco Use    Smoking status: Former     Types: Cigarettes     Quit date: 2000     Years since quittin.3    Smokeless tobacco: Former   Vaping Use    Vaping status: Never Used   Substance and Sexual Activity    Alcohol use: Not Currently     Comment: Occasional    Drug use: Not Currently    Sexual activity: Never     Partners: Female     Birth control/protection: Surgical   Other Topics Concern     Service Yes     Comment: Marine    Blood Transfusions No    Caffeine Concern No     Comment: Hasn't had caffeine for 2 weeks    Occupational Exposure No    Hobby Hazards No    Sleep Concern No    Stress Concern No    Weight Concern No    Special Diet Yes     Comment: Low Carb / High Protein    Back Care No    Exercise Yes     Comment: Walks    Seat Belt Yes       Review of Systems:  Skin:          Eyes:         ENT:         Respiratory:  Negative shortness of breath;cough;wheezing     Cardiovascular:  Negative;palpitations;chest pain;lightheadedness;dizziness;syncope or near-syncope Positive for;edema Edema- has been getting better  Gastroenterology:        Genitourinary:         Musculoskeletal:         Neurologic:  Negative numbness or tingling of hands;numbness or tingling of feet    Psychiatric:         Heme/Lymph/Imm:  Positive for allergies    Endocrine:           Physical Exam:  Vitals: /82   Pulse 88   Ht 1.778 m (5' 10\")   Wt 128.1 kg (282 lb 4.8 oz)   SpO2 96%   BMI 40.51 kg/m      Constitutional:  cooperative, alert and oriented, well developed, well nourished, in no acute distress obese      Skin:  warm and dry to the " touch, no apparent skin lesions or masses noted          Head:  normocephalic, no masses or lesions        Eyes:  sclera white;no xanthalasma        ENT:  no pallor or cyanosis   masked    Neck:  JVP normal        Respiratory:  normal breath sounds, clear to auscultation, normal A-P diameter, normal symmetry, normal respiratory excursion, no use of accessory muscles         Cardiac: regular rhythm;normal S1 and S2;no murmurs, gallops or rubs detected                not assessed this visit                                        GI:  abdomen soft;BS normoactive;no HSM obese      Extremities and Muscular Skeletal:  no edema;no deformities, clubbing, cyanosis, erythema observed         support stockings are one    Neurological:  no gross motor deficits   uses a cane    Psych:  affect appropriate, oriented to time, person and place        CC  Patti Coleman, ANY CNP  [address]  [place] [address]      Thank you for allowing me to participate in the care of your patient.      Sincerely,     Patti Coleman, NP, APRN CNP     [facility]

## 2023-05-18 NOTE — LETTER
May 18, 2023        Markie Rico  [address] [facility] [address]    To Whom it May Concern:    Markie Rico was seen in our office on 5/18/2023 and remains stable from a cardiac stand point. DOing well on his cardiac medications and follows with us on a regular basis.   Please contact me with any questions or concerns [phone]      Sincerely,            Patti Coleman NP, APRN CNP

## 2023-05-18 NOTE — PATIENT INSTRUCTIONS
Call my nurse with any questions or concerns:  [phone]  *If you have concerns after hours, please call [phone], option 2 to speak with on call Cardiologist.    1. Medication changes from today:    Increase metoprolol ER 25 mg twice daily  Keep up your walking  Call with any concerns  Echo and  in 3 months        2. Lab Results:       Latest Reference Range & Units 05/18/23 09:58   Sodium 136 - 145 mmol/L 139   Potassium 3.4 - 5.3 mmol/L 4.7   Chloride 98 - 107 mmol/L 103   Carbon Dioxide (CO2) 22 - 29 mmol/L 24   Urea Nitrogen 8.0 - 23.0 mg/dL 19.2   Creatinine 0.67 - 1.17 mg/dL 0.70   GFR Estimate >60 mL/min/1.73m2 >90   Calcium 8.8 - 10.2 mg/dL 9.6   Anion Gap 7 - 15 mmol/L 12   Glucose 70 - 99 mg/dL 95

## 2023-05-18 NOTE — PROGRESS NOTES
HPI:  Markie Rico is a delightful 62-year-old gentleman here today for follow-up. He is an established patient of Dr. Espinal.  He was referred to [facility] for optimization of his heart failure medications.       He has a history of coronary artery disease with MI in 2000.  He underwent stenting of his LAD with a bare-metal stent at that time.  In 2007 the patient reported another stenting procedure.  In 2011 he underwent a stent to his RCA and LAD.  His EF was 45%.  In 2018 he underwent RCA PCI at an outside facility.       In November 12, 2022 he underwent a hip arthroplasty at [facility].  On September 14 he developed chest pain with evidence of an acute anterior wall myocardial infarction.  He was brought to the Cath Lab at [facility] found to have an occlusion of his proximal LAD with no distal flow.  He underwent PTCA and with somewhat slow to distal flow at the conclusion of the case.  Last ejection fraction was noted at 33% (11/22).     Other prevalent history includes hypertension, hyperlipidemia, RIKY, osteoarthritis of the left hip, morbid obesity, and depression.     At today's visit he denies chest pain, shortness of breath, palpitations, lightheadedness, dizziness, syncope, or lower extremity edema.  Since starting his Jardiance he states that his leg edema has improved significantly.  He has not taken Lasix for several months.  His weight at home is down about 4 pounds.  He is also walking about a half a mile a day.  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  He has his DOT physical coming up.      cMRI 2/2/2023 which showed an EF of 31%.  He had apical, mid to distal anterior septal and anterior wall akinesis with walls thinned out. RV function was borderline reduced at 53%.  Late gadolinium enhancement showed subendocardial delay and transmural pattern involving the anterior, mid to distal anterior septal and apical walls consistent with large size infarct  in the LAD territory.  He had 33% scar noted.  The rest of the myocardium was essentially viable.  Stress perfusion imaging showed no ischemia.        Cath 9/2022:    Ost Cx lesion is 70% stenosed.    Prox RCA lesion is 20% stenosed.    Mid RCA lesion is 40% stenosed.    Dist RCA lesion is 40% stenosed.    2nd Mrg-1 lesion is 70% stenosed.    2nd Mrg-2 lesion is 50% stenosed.    Ost LAD to Mid LAD lesion is 100% stenosed        Limited echo 11/2022:   The left ventricle is moderately dilated with LVEF is 33%. Large area of severe hypokinesis of mid-distal anteroseptal, apical, and distal inferior walls. No valve disease     Latest Reference Range & Units 05/18/23 09:58   Sodium 136 - 145 mmol/L 139   Potassium 3.4 - 5.3 mmol/L 4.7   Chloride 98 - 107 mmol/L 103   Carbon Dioxide (CO2) 22 - 29 mmol/L 24   Urea Nitrogen 8.0 - 23.0 mg/dL 19.2   Creatinine 0.67 - 1.17 mg/dL 0.70   GFR Estimate >60 mL/min/1.73m2 >90   Calcium 8.8 - 10.2 mg/dL 9.6   Anion Gap 7 - 15 mmol/L 12   Glucose 70 - 99 mg/dL 95     Component      Latest Ref Rng 10/2/2022  10:30 PM   N-Terminal Pro BNP Inpatient      0 - 900 pg/mL 5,561 (H)       Plan:   1. CAD, and anterior wall myocardial infarction post left hip 9/12/2022  No complaints of chest pain   Status post successful PCI of ostial to mid LAD  with balloon angioplasty.  There was discussion about patient being referred for possible CABG, but viability study was completed.  Once medications are optimized I will leave that up to Dr. Espinal' discretion.  On aspirin and clopidogrel therapy.        2. ischemic cardiomyopathy EF 33%  Metoprolol ER 25 mg once daily, Entresto 49/51 mg twice daily, and Jardiance 10 mg daily was started at about 1 week ago.  [facility]  He is not taking Lasix but and has not required it in months.  He is euvolemic on exam.  I have opted to increase metoprolol ER to 25 mg twice daily.  I have opted to add a proBNP to his lab work.  I am  curious to see if he is trending down on GDMT     3.  Morbid obesity  On semaglutide 1 mg weekly  Patient is also trying intermittent fasting to no avail  Hopeful the Jardiance will also assist with any type of insulin resistance.     4.  History of anemia resolved hemoglobin normalized     5.  RIKY but patient has not been using CPAP    6.  Hypertension stable    I encouraged him to continue to increase his exercise.  He will follow-up in 3 months to see Dr. Espinal and repeat a limited echo to reassess LV function now that his medications have been optimized.  I am cautiously optimistic that his EF will improve.  His functional class is between 1 and 2.    Thank you for including us in his care.  A copy of his cardiac MRI, echo and lab work were given to the patient along with a brief letter stating he is stable from a cardiac standpoint for his DOT physical.    Patti Coleman, NP, APRN CNP      Today's clinic visit entailed:  Review of prior external note(s) from - cMRI, labs and echo  Prescription drug management  I spent a total of 30 minutes on the day of the visit.   Time spent by me doing chart review, history and exam, documentation and further activities per the note  Provider  Link to [facility] Help Grid     The level of medical decision making during this visit was of moderate complexity.      Orders Placed This Encounter   Procedures     N terminal pro BNP outpatient     Follow-Up with Cardiology     Echocardiogram Limited       Orders Placed This Encounter   Medications     metoprolol succinate ER (TOPROL XL) 25 MG 24 hr tablet     Sig: Take 1 tablet (25 mg) by mouth 2 times daily     Dispense:  180 tablet     Refill:  3       Medications Discontinued During This Encounter   Medication Reason     metoprolol succinate ER (TOPROL XL) 25 MG 24 hr tablet Reorder (No AVS / No eCancel)         Encounter Diagnoses   Name Primary?     Ischemic cardiomyopathy      Acute on chronic systolic heart failure (H) Yes        CURRENT MEDICATIONS:  Current Outpatient Medications   Medication Sig Dispense Refill     acetaminophen (TYLENOL) 325 MG tablet Take 2 tablets (650 mg) by mouth every 6 hours as needed for other (mild pain) 100 tablet 0     aspirin (ASA) 81 MG EC tablet Take 1 tablet (81 mg) by mouth daily 30 tablet 0     atorvastatin (LIPITOR) 20 MG tablet Take 1 tablet (20 mg) by mouth daily 30 tablet 0     buPROPion (WELLBUTRIN SR) 150 MG 12 hr tablet Take 150 mg by mouth daily       Cinnamon Bark POWD        clopidogrel (PLAVIX) 75 MG tablet Take 1 tablet (75 mg) by mouth daily 30 tablet 0     empagliflozin (JARDIANCE) 10 MG TABS tablet Take 1 tablet (10 mg) by mouth daily 90 tablet 3     MAGNESIUM OXIDE PO Take 500 mg by mouth daily       methocarbamol (ROBAXIN) 500 MG tablet Take 1 tablet (500 mg) by mouth 3 times daily as needed for muscle spasms 90 tablet 0     metoprolol succinate ER (TOPROL XL) 25 MG 24 hr tablet Take 1 tablet (25 mg) by mouth 2 times daily 180 tablet 3     Multiple Vitamins-Minerals (CENTRUM MEN PO) Take 1 tablet by mouth daily       order for DME Equipment being ordered: Walker ()  Treatment Diagnosis: s/p joint replacement 1 Device 0     sacubitril-valsartan (ENTRESTO) 49-51 MG per tablet Take 1 tablet by mouth 2 times daily 180 tablet 3     SEMAGLUTIDE, 1 MG/DOSE, SC Inject 1 mg Subcutaneous once a week Tuesdays       senna-docusate (SENOKOT-S/PERICOLACE) 8.6-50 MG tablet Take 1-2 tablets by mouth 2 times daily Take while on oral narcotics to prevent or treat constipation. 30 tablet 0     traZODone (DESYREL) 50 MG tablet Take 1 tablet (50 mg) by mouth At Bedtime 30 tablet 1     TURMERIC PO        furosemide (LASIX) 10 MG/ML solution Take 1 mL (10 mg) by mouth 2 times daily (Patient not taking: Reported on 2/15/2023) 10 mL 0     metoprolol succinate ER (TOPROL XL) 25 MG 24 hr tablet Take 0.5 tablets (12.5 mg) by mouth daily for 30 days 15 tablet 0     nitroGLYcerin (NITROSTAT) 0.4 MG  sublingual tablet For chest pain place 1 tablet under the tongue every 5 minutes for 3 doses. If symptoms persist 5 minutes after 1st dose call 911. (Patient not taking: Reported on 2/15/2023) 25 tablet 0       ALLERGIES     Allergies   Allergen Reactions     No Known Allergies      Orlistat      Other reaction(s): Incontinence of feces       PAST MEDICAL HISTORY:  Past Medical History:   Diagnosis Date     Diverticulitis      Old myocardial infarction     s/p successful emergent revascualrization, PTCR/stent at the LAD for acute MI     Old myocardial infarction 13    Non-Q-wave MI-[place]     Other and unspecified hyperlipidemia      Tobacco use disorder        PAST SURGICAL HISTORY:  Past Surgical History:   Procedure Laterality Date     ARTHROPLASTY HIP Right 2018    Procedure: ARTHROPLASTY HIP;  right total hip arthroplasty;  Surgeon: Vineet Bedolla MD;  Location: [facility] OR     ARTHROPLASTY HIP Left 2022    Procedure: LEFT TOTAL HIP ARTHROPLASTY;  Surgeon: Vineet Briseno MD;  Location: [facility] OR     COLONOSCOPY N/A 2022    Procedure: COLONOSCOPY;  Surgeon: Mundo Velazquez MD;  Location:  GI     CV CORONARY ANGIOGRAM N/A 2022    Procedure: Coronary Angiogram;  Surgeon: Js Brady MD;  Location: [facility] CARDIAC CATH LAB     CV PCI N/A 2022    Procedure: Percutaneous Coronary Intervention;  Surgeon: Js Brady MD;  Location: [facility] CARDIAC CATH LAB     [facility] ANESTH,DX ARTHROSCOPIC PROC KNEE JOINT       ZC COLOSTOMY       Z UNLISTED LAPAROSCOPY PROC,INTESTINE  2003    Exploratory Lap. Lysis of adhesions. Sigmoid colostomy takedown and stapled coloproctostomy.     [facility] PTCA SINGLE VESSEL      2 stents       FAMILY HISTORY:  Family History   Problem Relation Age of Onset     Lipids Mother      Psychotic Disorder Mother         anxiety     Hypertension Mother      Prostate Cancer Father          at 74     Diabetes Sister        SOCIAL  "HISTORY:  Social History     Socioeconomic History     Marital status:      Spouse name: Farida     Number of children: 3     Years of education: 12     Highest education level: None   Occupational History     Occupation: install [employer]   Tobacco Use     Smoking status: Former     Types: Cigarettes     Quit date: 2000     Years since quittin.3     Smokeless tobacco: Former   Vaping Use     Vaping status: Never Used   Substance and Sexual Activity     Alcohol use: Not Currently     Comment: Occasional     Drug use: Not Currently     Sexual activity: Never     Partners: Female     Birth control/protection: Surgical   Other Topics Concern      Service Yes     Comment: Marine     Blood Transfusions No     Caffeine Concern No     Comment: Hasn't had caffeine for 2 weeks     Occupational Exposure No     Hobby Hazards No     Sleep Concern No     Stress Concern No     Weight Concern No     Special Diet Yes     Comment: Low Carb / High Protein     Back Care No     Exercise Yes     Comment: Walks     Seat Belt Yes       Review of Systems:  Skin:          Eyes:         ENT:         Respiratory:  Negative shortness of breath;cough;wheezing     Cardiovascular:  Negative;palpitations;chest pain;lightheadedness;dizziness;syncope or near-syncope Positive for;edema Edema- has been getting better  Gastroenterology:        Genitourinary:         Musculoskeletal:         Neurologic:  Negative numbness or tingling of hands;numbness or tingling of feet    Psychiatric:         Heme/Lymph/Imm:  Positive for allergies    Endocrine:           Physical Exam:  Vitals: /82   Pulse 88   Ht 1.778 m (5' 10\")   Wt 128.1 kg (282 lb 4.8 oz)   SpO2 96%   BMI 40.51 kg/m      Constitutional:  cooperative, alert and oriented, well developed, well nourished, in no acute distress obese      Skin:  warm and dry to the touch, no apparent skin lesions or masses noted          Head:  normocephalic, no masses or lesions    "     Eyes:  sclera white;no xanthalasma        ENT:  no pallor or cyanosis   masked    Neck:  JVP normal        Respiratory:  normal breath sounds, clear to auscultation, normal A-P diameter, normal symmetry, normal respiratory excursion, no use of accessory muscles         Cardiac: regular rhythm;normal S1 and S2;no murmurs, gallops or rubs detected                not assessed this visit                                        GI:  abdomen soft;BS normoactive;no HSM obese      Extremities and Muscular Skeletal:  no edema;no deformities, clubbing, cyanosis, erythema observed         support stockings are one    Neurological:  no gross motor deficits   uses a cane    Psych:  affect appropriate, oriented to time, person and place        ANY Hernandez CNP  [address]  [place] [id number]

## 2023-05-23 ENCOUNTER — HOSPITAL ENCOUNTER (OUTPATIENT)
Dept: CARDIOLOGY | Facility: CLINIC | Age: 63
Discharge: HOME | End: 2023-05-23
Attending: NURSE PRACTITIONER | Admitting: NURSE PRACTITIONER
Payer: COMMERCIAL

## 2023-05-23 DIAGNOSIS — I25.5 ISCHEMIC CARDIOMYOPATHY: ICD-10-CM

## 2023-05-23 LAB — LVEF ECHO: NORMAL

## 2023-05-23 PROCEDURE — 93321 DOPPLER ECHO F-UP/LMTD STD: CPT

## 2023-05-23 PROCEDURE — 93321 DOPPLER ECHO F-UP/LMTD STD: CPT | Mod: 26 | Performed by: INTERNAL MEDICINE

## 2023-05-23 PROCEDURE — 93308 TTE F-UP OR LMTD: CPT | Mod: 26 | Performed by: INTERNAL MEDICINE

## 2023-05-23 PROCEDURE — 255N000002 HC RX 255 OP 636: Performed by: NURSE PRACTITIONER

## 2023-05-23 PROCEDURE — 999N000208 ECHOCARDIOGRAM LIMITED

## 2023-05-23 PROCEDURE — 93325 DOPPLER ECHO COLOR FLOW MAPG: CPT | Mod: 26 | Performed by: INTERNAL MEDICINE

## 2023-05-23 RX ADMIN — HUMAN ALBUMIN MICROSPHERES AND PERFLUTREN 2 ML: 10; .22 INJECTION, SOLUTION INTRAVENOUS at 13:21

## 2023-06-07 ENCOUNTER — TELEPHONE (OUTPATIENT)
Dept: CARDIOLOGY | Facility: CLINIC | Age: 63
End: 2023-06-07

## 2023-06-07 NOTE — TELEPHONE ENCOUNTER
Tried to reach patient by phone x3, left v/m x3, also sent [employer] message in regards to most recent echocardiogram results and needing DOT clearance. No response from patient.     Patti Coleman, Joy White CNP, RN  Lets try reaching out again if you haven't heard from him. Thank you

## 2023-06-08 NOTE — TELEPHONE ENCOUNTER
Called and spoke with patient, he states that [facility] and does not wish to schedule one in [place] earlier. He states that the [facility] wants [facility] a [facility]ologist through them. He states that he is working with the [facility] to get clarification on this as he would like to continue seeing Patti Coleman CNP if possible. He will reach out if he needs any further assistance from our team.     Patricia Chaparro RN   [facility]

## (undated) DEVICE — LUBRICATING JELLY 4.25OZ

## (undated) DEVICE — GLOVE PROTEXIS W/NEU-THERA 8.0  2D73TE80

## (undated) DEVICE — CATH BALLOON EMERGE 2.5X15MM H7493918915250

## (undated) DEVICE — CATH TURNPIKE LP 135CM

## (undated) DEVICE — PACK TOTAL JOINT STD LATEX

## (undated) DEVICE — DRAPE SHEET REV FOLD 3/4 9349

## (undated) DEVICE — MANIFOLD KIT ANGIO AUTOMATED 014613

## (undated) DEVICE — KIT HAND CONTROL ANGIOTOUCH ACIST 65CM AT-P65

## (undated) DEVICE — CATH BALLOON NC EMERGE 2.50X15MM H7493926715250

## (undated) DEVICE — SOL WATER IRRIG 1000ML BOTTLE 07139-09

## (undated) DEVICE — BLADE SAW OSCILLATING LINVATEC 5071-548

## (undated) DEVICE — INTRO SHEATH 4FRX10CM PINNACLE RSS402

## (undated) DEVICE — SPONGE RAY-TEC 4X8" 7318

## (undated) DEVICE — BONE CLEANING TIP INTERPULSE  0210-010-000

## (undated) DEVICE — ADH SKIN CLOSURE PREMIERPRO EXOFIN 1.0ML 3470

## (undated) DEVICE — RAD INTRODUCER KIT MICRO 5FRX10CM .018 NITINOL G/W

## (undated) DEVICE — DRAPE TIBURON HIP W/POUCH 29439

## (undated) DEVICE — TOTE ANGIO CORP PC15AT SAN32CC83O

## (undated) DEVICE — CATH RX TAKERU OTW PTCA BALLOON 1.5X15MM DC-RY1515UA1

## (undated) DEVICE — SOL NACL 0.9% IRRIG 3000ML BAG 07972-08

## (undated) DEVICE — GLOVE PROTEXIS W/NEU-THERA 6.5  2D73TE65

## (undated) DEVICE — GLOVE ESTEEM BLUE W/NEU-THERA 8.5  2D73PB85

## (undated) DEVICE — CATH TRAY FOLEY 16FR SILICONE 907416

## (undated) DEVICE — SYR 30ML LL W/O NDL

## (undated) DEVICE — BLADE SAW SAGITTAL STRK 25X90X1.19MM HD SYS 6 6125-119-090

## (undated) DEVICE — SU VICRYL 2-0 CT-1 27" UND J259H

## (undated) DEVICE — IMM PILLOW ABDUCT HIP MED 0814-8033

## (undated) DEVICE — DRAPE U SPLIT 74X120" 29440

## (undated) DEVICE — GUIDEWIRE VASC 0.014INX190CM J TIP CGRXT190HJ

## (undated) DEVICE — TUBING SUCTION 12"X1/4" N612

## (undated) DEVICE — GOWN XLG DISP 9545

## (undated) DEVICE — SUCTION IRR SYSTEM W/O TIP INTERPULSE HANDPIECE 0210-100-000

## (undated) DEVICE — GW .014 X 190CM STR HI-TORQUE

## (undated) DEVICE — DRSG XEROFORM 1X8"

## (undated) DEVICE — GLOVE PROTEXIS BLUE W/NEU-THERA 8.0  2D73EB80

## (undated) DEVICE — PREP CHLORAPREP 26ML TINTED ORANGE  260815

## (undated) DEVICE — GLOVE PROTEXIS W/NEU-THERA 7.5  2D73TE75

## (undated) DEVICE — STPL SKIN 35W APPOSE 8886803712

## (undated) DEVICE — DRSG ABDOMINAL 07 1/2X8" 7197D

## (undated) DEVICE — KIT ENDO TURNOVER/PROCEDURE CARRY-ON 101822

## (undated) DEVICE — DRSG AQUACEL AG 3.5X9.75" HYDROFIBER 412011

## (undated) DEVICE — GLOVE EXAM NITRILE LG

## (undated) DEVICE — DRSG KERLIX FLUFFS X5

## (undated) DEVICE — DRSG STERI STRIP 1/2X4" R1547

## (undated) DEVICE — SU VICRYL 2-0 CT-2 27" UND J269H

## (undated) DEVICE — SUCTION TIP YANKAUER ORTHO SUPER SUCKER EFS-111

## (undated) DEVICE — ESU PENCIL SMOKE EVAC W/ROCKER SWITCH 0703-047-000

## (undated) DEVICE — ESU BIPOLAR SEALER AQUAMANTYS 6MM 23-112-1

## (undated) DEVICE — SU VICRYL 0 CP-1 27" UND J267H

## (undated) DEVICE — HOOD FLYTE 0408-800-000

## (undated) DEVICE — SYR 10ML PREFILLED 0.9% NACL INJ NOT STERILE 306500

## (undated) DEVICE — SU ETHIBOND 5 V-37 4X30" MB66G

## (undated) DEVICE — DRAPE CONVERTORS U-DRAPE 60X72" 8476

## (undated) DEVICE — SU MONOCRYL 3-0 PS-2 18" UND Y497G

## (undated) DEVICE — Device

## (undated) DEVICE — TAPE MEDIPORE 4"X10YD 2964

## (undated) DEVICE — LINE MONITOR NASAL SMART CAPNOLINE ADULT LONG 12463

## (undated) DEVICE — DRAPE STERI TOWEL SM 1000

## (undated) DEVICE — CANISTER ENGINE FOR INDIGO SYSTEM

## (undated) DEVICE — CATH ANGIO INFINITI 3DRC 4FRX100CM 538476

## (undated) DEVICE — BLN CUT WOLVERINE 3.50MM X 15M

## (undated) DEVICE — CATH IVUS OPTICROSS HD 6 3.6FR 1.18MM DIA 135CML H7493935408

## (undated) DEVICE — SU ETHIBOND 2 V-37 4X30" MX69G

## (undated) DEVICE — SU VICRYL 0 CTX 36" UND J978H

## (undated) DEVICE — GLOVE PROTEXIS W/NEU-THERA 8.5  2D73TE85

## (undated) DEVICE — DEFIB PRO-PADZ LVP LQD GEL ADULT 8900-2105-01

## (undated) DEVICE — NDL ECLIPSE 18GA 1.5"

## (undated) DEVICE — ADH LIQUID MASTISOL TOPICAL VIAL 2-3ML 0523-48

## (undated) DEVICE — CATH RX TAKERU PTCA BALLOON 2.00MM X 12MM

## (undated) DEVICE — NDL PERC ENTRY THINWALL 18GA 7.0" G00166

## (undated) DEVICE — DRAPE IOBAN INCISE 36X23" 6651EZ

## (undated) DEVICE — NDL SPINAL 18GA 3.5" 405184

## (undated) DEVICE — GLOVE PROTEXIS BLUE W/NEU-THERA 6.5  2D73EB65

## (undated) DEVICE — POSITIONER ABDUCTION PILLOW FOAM MED FP-ABDUCTM

## (undated) DEVICE — DRSG AQUACEL AG 3.5X14"  422607

## (undated) DEVICE — DEVICE RETRIEVER HEWSON 71111579

## (undated) DEVICE — ESU GROUND PAD UNIVERSAL W/O CORD

## (undated) DEVICE — STRAP STIRRUP W/SLIP 30187-030

## (undated) DEVICE — STOCKING SLEEVE COMPRESSION CALF MED

## (undated) DEVICE — CATH DIAG 4FR JL 4.5 538417

## (undated) DEVICE — INTRODUCER SHEATH GREEN 6.5FRX11CM .038IN PSI-6F-11-038ACT

## (undated) DEVICE — DEVICE RETRIEVER ACL

## (undated) DEVICE — CATH SECURE 5445-3

## (undated) DEVICE — GUIDEWIRE ASAHI FIELDER XT 190

## (undated) DEVICE — ESU ELEC BLADE 6" COATED E1450-6

## (undated) DEVICE — PAD FOAM MCGUIRE KIT 0814-0150

## (undated) DEVICE — CATH LAUNCHER 6FR LA6EBU375

## (undated) DEVICE — DRAPE POUCH INSTRUMENT 1018

## (undated) RX ORDER — CLOPIDOGREL 300 MG/1
TABLET, FILM COATED ORAL
Status: DISPENSED
Start: 2022-09-12

## (undated) RX ORDER — HYDROMORPHONE HYDROCHLORIDE 1 MG/ML
INJECTION, SOLUTION INTRAMUSCULAR; INTRAVENOUS; SUBCUTANEOUS
Status: DISPENSED
Start: 2022-09-12

## (undated) RX ORDER — FENTANYL CITRATE 50 UG/ML
INJECTION, SOLUTION INTRAMUSCULAR; INTRAVENOUS
Status: DISPENSED
Start: 2022-09-12

## (undated) RX ORDER — HYDROMORPHONE HYDROCHLORIDE 1 MG/ML
INJECTION, SOLUTION INTRAMUSCULAR; INTRAVENOUS; SUBCUTANEOUS
Status: DISPENSED
Start: 2018-02-12

## (undated) RX ORDER — HEPARIN SODIUM 200 [USP'U]/100ML
INJECTION, SOLUTION INTRAVENOUS
Status: DISPENSED
Start: 2022-09-12

## (undated) RX ORDER — ONDANSETRON 2 MG/ML
INJECTION INTRAMUSCULAR; INTRAVENOUS
Status: DISPENSED
Start: 2022-09-12

## (undated) RX ORDER — DEXAMETHASONE SODIUM PHOSPHATE 10 MG/ML
INJECTION, SOLUTION INTRAMUSCULAR; INTRAVENOUS
Status: DISPENSED
Start: 2022-09-12

## (undated) RX ORDER — PHENYLEPHRINE HCL IN 0.9% NACL 1 MG/10 ML
SYRINGE (ML) INTRAVENOUS
Status: DISPENSED
Start: 2018-02-12

## (undated) RX ORDER — GLYCOPYRROLATE 0.2 MG/ML
INJECTION INTRAMUSCULAR; INTRAVENOUS
Status: DISPENSED
Start: 2022-09-12

## (undated) RX ORDER — PROPOFOL 10 MG/ML
INJECTION, EMULSION INTRAVENOUS
Status: DISPENSED
Start: 2022-09-12

## (undated) RX ORDER — FENTANYL CITRATE-0.9 % NACL/PF 10 MCG/ML
PLASTIC BAG, INJECTION (ML) INTRAVENOUS
Status: DISPENSED
Start: 2022-09-12

## (undated) RX ORDER — HEPARIN SODIUM 1000 [USP'U]/ML
INJECTION, SOLUTION INTRAVENOUS; SUBCUTANEOUS
Status: DISPENSED
Start: 2022-09-12

## (undated) RX ORDER — ONDANSETRON 2 MG/ML
INJECTION INTRAMUSCULAR; INTRAVENOUS
Status: DISPENSED
Start: 2018-02-12

## (undated) RX ORDER — FENTANYL CITRATE 50 UG/ML
INJECTION, SOLUTION INTRAMUSCULAR; INTRAVENOUS
Status: DISPENSED
Start: 2018-02-12

## (undated) RX ORDER — LIDOCAINE HYDROCHLORIDE 10 MG/ML
INJECTION, SOLUTION EPIDURAL; INFILTRATION; INTRACAUDAL; PERINEURAL
Status: DISPENSED
Start: 2022-09-12

## (undated) RX ORDER — PROPOFOL 10 MG/ML
INJECTION, EMULSION INTRAVENOUS
Status: DISPENSED
Start: 2018-02-12

## (undated) RX ORDER — NITROGLYCERIN 5 MG/ML
VIAL (ML) INTRAVENOUS
Status: DISPENSED
Start: 2022-09-12

## (undated) RX ORDER — NITROGLYCERIN 0.4 MG/1
TABLET SUBLINGUAL
Status: DISPENSED
Start: 2022-09-12

## (undated) RX ORDER — BUPIVACAINE HYDROCHLORIDE AND EPINEPHRINE 2.5; 5 MG/ML; UG/ML
INJECTION, SOLUTION EPIDURAL; INFILTRATION; INTRACAUDAL; PERINEURAL
Status: DISPENSED
Start: 2022-09-12

## (undated) RX ORDER — VANCOMYCIN HYDROCHLORIDE 1 G/20ML
INJECTION, POWDER, LYOPHILIZED, FOR SOLUTION INTRAVENOUS
Status: DISPENSED
Start: 2022-09-12

## (undated) RX ORDER — LIDOCAINE HYDROCHLORIDE 20 MG/ML
INJECTION, SOLUTION EPIDURAL; INFILTRATION; INTRACAUDAL; PERINEURAL
Status: DISPENSED
Start: 2022-09-12

## (undated) RX ORDER — LIDOCAINE HYDROCHLORIDE 20 MG/ML
INJECTION, SOLUTION EPIDURAL; INFILTRATION; INTRACAUDAL; PERINEURAL
Status: DISPENSED
Start: 2018-02-12